# Patient Record
Sex: FEMALE | Race: WHITE | URBAN - METROPOLITAN AREA
[De-identification: names, ages, dates, MRNs, and addresses within clinical notes are randomized per-mention and may not be internally consistent; named-entity substitution may affect disease eponyms.]

---

## 2017-03-27 ENCOUNTER — TRANSFERRED RECORDS (OUTPATIENT)
Dept: HEALTH INFORMATION MANAGEMENT | Facility: CLINIC | Age: 37
End: 2017-03-27

## 2020-02-06 ENCOUNTER — TRANSFERRED RECORDS (OUTPATIENT)
Dept: HEALTH INFORMATION MANAGEMENT | Facility: CLINIC | Age: 40
End: 2020-02-06

## 2020-06-11 ENCOUNTER — TRANSFERRED RECORDS (OUTPATIENT)
Dept: HEALTH INFORMATION MANAGEMENT | Facility: CLINIC | Age: 40
End: 2020-06-11

## 2020-06-16 ENCOUNTER — TRANSFERRED RECORDS (OUTPATIENT)
Dept: HEALTH INFORMATION MANAGEMENT | Facility: CLINIC | Age: 40
End: 2020-06-16

## 2020-06-19 ENCOUNTER — TRANSFERRED RECORDS (OUTPATIENT)
Dept: HEALTH INFORMATION MANAGEMENT | Facility: CLINIC | Age: 40
End: 2020-06-19

## 2020-06-26 ENCOUNTER — TRANSFERRED RECORDS (OUTPATIENT)
Dept: HEALTH INFORMATION MANAGEMENT | Facility: CLINIC | Age: 40
End: 2020-06-26

## 2020-07-07 ENCOUNTER — PATIENT OUTREACH (OUTPATIENT)
Dept: ONCOLOGY | Facility: CLINIC | Age: 40
End: 2020-07-07

## 2020-07-07 DIAGNOSIS — C50.919 BREAST CANCER (H): Primary | ICD-10-CM

## 2020-07-07 NOTE — TELEPHONE ENCOUNTER
Action    Action Taken 7/7/20: Images from [facility], and now viewable to PACS    -[facility] Tracking [place] [place] [id number]  2:59 PM     RECORDS STATUS - BREAST    RECORDS REQUESTED FROM: [facility]   DATE REQUESTED:    NOTES DETAILS STATUS   OFFICE NOTE from referring provider Requested 7/7 [facility]   OFFICE NOTE from medical oncologist     OFFICE NOTE from surgeon     OFFICE NOTE from radiation oncologist     DISCHARGE SUMMARY from hospital     DISCHARGE REPORT from the ER     OPERATIVE REPORT     MEDICATION LIST     CLINICAL TRIAL TREATMENTS TO DATE     LABS     PATHOLOGY REPORTS  (Tissue diagnosis, Stage, ER/OH percentage positive and intensity of staining, HER2 IHC, FISH, and all biopsies from breast and any distant metastasis)                 Slides & Report [place] [facility] [place]    GENONOMIC TESTING     TYPE:   (Next Generation Sequencing, including Foundation One testing, and Oncotype score)     IMAGING (NEED IMAGES & REPORT)     CT SCANS     MRI     MAMMO PACS 6/26/20, 6/11/20   ULTRASOUND PACS 6/11/20   PET     BONE SCAN     BRAIN MRI

## 2020-07-07 NOTE — PROGRESS NOTES
New Patient Oncology Nurse Navigator Note     Referring provider: Dr Gaxiola, [facility]    Referring Clinic/Organization: Other - [facility]     Referred to: Medical Oncology - Solid Tumor     Requested provider (if applicable): Dr. Gerardo    Referral Received: 07/07/20       Evaluation for : breast cancer, right     Clinical History (per Nurse review of records provided):      No records received from [facility] yet, but pt's verbal report states:    * 6/4 pt notices lump, right breast   * 6/11 mammogram & US with biopsy - confirms DCIS, Right Breast (ER + AR + HER2 +)   * 6/15 & 6/16 met w/ Surg Onc and plastics   * 6/19 med onc     * Pt states she will get MRI on 7/9 at 1:30pm at [facility] as well.    Clinical Assessment / Barriers to Care (Per Nurse):    Pt requests 2nd opinion with [facility], Dr Gerardo can do video visit with pt on Thurs 7/9 at 7:30am. Pt agreeable to this plan. Records will start process of record collection today.       Records Location: No records received     Records Needed:     [facility], Juvencio MONTALVO    Additional testing needed prior to consult:     None    Kev Snyder, RN, BSN, OCN

## 2020-07-08 ENCOUNTER — PRE VISIT (OUTPATIENT)
Dept: ONCOLOGY | Facility: CLINIC | Age: 40
End: 2020-07-08

## 2020-07-09 ENCOUNTER — TRANSFERRED RECORDS (OUTPATIENT)
Dept: HEALTH INFORMATION MANAGEMENT | Facility: CLINIC | Age: 40
End: 2020-07-09

## 2020-07-09 ENCOUNTER — VIRTUAL VISIT (OUTPATIENT)
Dept: ONCOLOGY | Facility: CLINIC | Age: 40
End: 2020-07-09
Attending: INTERNAL MEDICINE
Payer: COMMERCIAL

## 2020-07-09 DIAGNOSIS — C50.911 MALIGNANT NEOPLASM OF RIGHT FEMALE BREAST, UNSPECIFIED ESTROGEN RECEPTOR STATUS, UNSPECIFIED SITE OF BREAST (H): Primary | ICD-10-CM

## 2020-07-09 DIAGNOSIS — C50.919 BREAST CANCER (H): ICD-10-CM

## 2020-07-09 PROCEDURE — 99204 OFFICE O/P NEW MOD 45 MIN: CPT | Mod: 95 | Performed by: INTERNAL MEDICINE

## 2020-07-09 PROCEDURE — 40001009 ZZH VIDEO/TELEPHONE VISIT; NO CHARGE

## 2020-07-09 SDOH — HEALTH STABILITY: MENTAL HEALTH: HOW MANY STANDARD DRINKS CONTAINING ALCOHOL DO YOU HAVE ON A TYPICAL DAY?: 1 OR 2

## 2020-07-09 ASSESSMENT — MIFFLIN-ST. JEOR: SCORE: 1175.04

## 2020-07-09 ASSESSMENT — PAIN SCALES - GENERAL: PAINLEVEL: NO PAIN (0)

## 2020-07-09 NOTE — LETTER
"    7/9/2020         RE: Leah Rangel  [address] [address]        Dear Colleague,    Thank you for referring your patient, Leah Rangel, to the [facility]. Please see a copy of my visit note below.    Leah Rangel is a 40 year old female who is being evaluated via a billable video visit.      The patient has been notified of following:     \"This video visit will be conducted via a call between you and your physician/provider. We have found that certain health care needs can be provided without the need for an in-person physical exam.  This service lets us provide the care you need with a video conversation.  If a prescription is necessary we can send it directly to your pharmacy.  If lab work is needed we can place an order for that and you can then stop by our lab to have the test done at a later time.    Video visits are billed at different rates depending on your insurance coverage.  Please reach out to your insurance provider with any questions.    If during the course of the call the physician/provider feels a video visit is not appropriate, you will not be charged for this service.\"    Patient has given verbal consent for Video visit? Yes    How would you like to obtain your AVS? MyChart     Vitals - Patient Reported  Weight (Patient Reported): 47.6 kg (105 lb)  Height (Patient Reported): 162.6 cm (5' 4\")  BMI (Based on Pt Reported Ht/Wt): 18.02    Aron Ding LPN    Video-Visit Details    Type of service:  Video Visit    Video Time:  50 min    Originating Location (pt. Location): Home    Distant Location (provider location):  [facility]     Platform used for Video Visit: Lucio Gerardo MD        Service Date: 07/09/2020      VIDEO VISIT       ONCOLOGY CONSULT NOTE      I was asked to see Ms. Leah Rangel at the request of Dr. Minor Erickson for newly diagnosed right breast cancer.      HISTORY OF PRESENT ILLNESS:  Leah" is a 40-year-old, premenopausal female with no significant past medical history who comes in today for consultation regarding a new right breast cancer.  I am seeing her over a video visit due to the COVID pandemic.      Leah reports that she has a history of bilateral implants.  She noticed a palpable mass involving her right breast in the upper inner quadrant in early 06/2020.  At that time, she went in for an evaluation with a mammogram and ultrasound.  Mammogram on 06/11/2020 revealed a 7 mm mass at the 1 o'clock position approximately 5 cm from the nipple in the upper inner quadrant of the right breast.  There was a second area measuring 6 x 5 x 4 mm at the 1 o'clock position 6 cm from the nipple.  In the left breast, there was an area of microcalcifications.  She was referred for a right breast biopsy.  Both areas at the 1 o'clock position were biopsied.  No lymph nodes were appreciated.  Both of these biopsies revealed an invasive ductal carcinoma, grade 3, ER positive, AR positive, HER-2 positive, 3+ by IHC.  She met with a surgeon, a plastic surgeon as well as a medical oncologist, Dr. Adelso Balderas.  She was discussed having neoadjuvant chemotherapy followed by bilateral mastectomy and reconstruction.  She is anxious to get started with therapy.  She has been discussing getting a second opinion and as a result comes to see me.  At the time of evaluation by Dr. Adelso Balderas, she was felt to have a palpable right axillary lymph node measuring 1.5 cm and was considered to have a T1b multifocal N1 breast cancer.      On evaluation, her imaging is as described above.  She was subsequently referred for a breast MRI.  The MRI was of poor quality and is rescheduled for today.  She has had no systemic imaging.      Leah reports she is feeling somewhat anxious about her new diagnosis.  She has a history of tobacco use; she quit 8 days ago.  She drinks approximately 3-4 alcoholic beverages daily and has done this  for many years.      She is premenopausal.  She has a Mirena IUD in place that is scheduled to be removed tomorrow.  She has 2 daughters that are 16 and age 8.  She has had no prior breast biopsies.  P4.      REVIEW OF SYSTEMS:  Her 10 point review of systems is otherwise negative.  She is on the video visit with her  and her sister.      PAST MEDICAL HISTORY:  Significant for:   1.  Premenopausal with a Mirena IUD in place.   2.  Bilateral implants.   3.  Tobacco use.   4.  Macrocytosis.   5.  History of alcohol abuse.   6.  Newly diagnosed right breast cancer.      MEDICATIONS:  None.      ALLERGIES:  No known drug allergies.      SOCIAL HISTORY:  She works as an  for [employer].  She is currently on [employer].  She has 2 daughters, ages 16 and 8.  She is .  She is here on the video with her  and her sister.  She has a supportive family.  She reports regular tobacco use.  She quit 8 days ago as well as alcoholic beverages 3-4 drinks per day.      FAMILY HISTORY:  She had a father who passed away at age 51 of a carcinoma of unknown primary.  Paternal grandmother had breast cancer at age 55.  Paternal aunt had breast cancer at age 53.  Her mother is alive and healthy.  Mother has diabetes.  She has 1 brother and 1 sister.  Her sister has Graves disease and dyslipidemia.      PHYSICAL EXAMINATION:  She is well appearing.  She appears in no apparent distress.  Her HEENT exam reveals no icterus.  She has a full affect.  She is breathing at a comfortable rate.  Her skin has no rashes.  She is able to show me on the imaging this palpable area involving the right upper inner quadrant of her breast.      LABORATORIES AND IMAGING:  Her outside imaging was reviewed revealing 2 focal areas in the right breast.  There is a question on the mammogram report about calcifications involving the left breast.  She has no breast MRI, as the images were of poor quality.      White blood cell count  7.0, hemoglobin 14.6 with macrocytosis and MCV of 105, platelets 231.      ASSESSMENT AND PLAN:  This is a 40-year-old, premenopausal female with a history of tobacco and alcohol use and bilateral breast implants who comes in with a T1b multifocal estrogen-receptor positive, progesterone-receptor positive, HER-2 positive breast cancer.  It is unclear her sylvie status as I met her over a video visit.  Her imaging thus far has not demonstrated any palpable nodes.      I reviewed with the patient today that she appears to have a curable cancer.  We discussed the next steps in terms of her ongoing treatment.      I discussed with Leah that she has HER-2 positive breast cancer.  As a result, I would recommend consideration for neoadjuvant chemotherapy.  We briefly discussed that going forward with surgical resection first could be a reasonable next step.  However, knowing the response to treatment in the neoadjuvant setting can allow us to tailor adjuvant therapies depending on her response.      I would like her to get a breast MRI performed, and this is scheduled for later today.  I would like to review her imaging with Radiology.  The outside report suggested there may also be calcifications involving the left breast.      I reviewed with her moving forward with chemotherapy using Taxol weekly for 12 weeks in conjunction with Herceptin and Perjeta.  Herceptin and Perjeta are 2 monoclonal antibodies against HER-2 and HER-3.  These are given as IVs every 3 weeks for 1 whole year.  Initially, they are given with Taxol.  We discussed if she does appear to have lymph node involvement or more extensive disease by imaging, I would recommend a different chemotherapy regimen using docetaxel and carboplatin in conjunction with Herceptin and Perjeta IV every 3 weeks for 6 cycles followed by Herceptin and Perjeta to complete 1 year of therapy.  We discussed that in the adjuvant setting after surgery, sometimes adjuvant  Herceptin is switched to T-DM1 based on the response to treatment and the results of the TOMAS trial.  She will need a port placed as well as an echocardiogram.  If she has sylvie disease, I would recommend systemic imaging at baseline.      She has met with a surgeon and a plastic surgeon.  She feels very comfortable moving forward with bilateral mastectomy and reconstruction.  I did review with her that there is no survival advantage to doing a mastectomy.  We discussed the pros and cons.  I reviewed that there is an increased risk of complications in a patient who is a tobacco user.  She just recently quit.  I would like to see her genetic testing.  We did discuss that if there is an abnormality, it would be very reasonable to move forward with bilateral mastectomy.      Given her tumor is estrogen positive, she will need antiestrogen therapy using tamoxifen or an aromatase inhibitor.  We very briefly discussed this and reviewed that we will discuss this again in the future as we get closer to that time.      It was a pleasure to meet Cornelio today.        NEXT STEPS:     1.  Echocardiogram.   2.  Blood work, including genetic testing.  I reviewed and consented the patient over the telephone today.  Based on her personal and family history, she meets criteria.  This would impact her treatment as well as her surgical planning.   3.  Breast MRI that will need to be reviewed here.   4.  Port.   5.  Schedule chemotherapy to begin next week.   6.  She is having her Mirena IUD removed.   7.  I encouraged her to maintain abstinence from alcohol and tobacco use.   8.  Provided additional supportive care services for the patient.      We will plan to schedule her next week.  I will call her when I get her MRI reports.         TOI TAFOYA MD             D: 2020   T: 2020   MT: myah      Name:     CORNELIO ARCOS   MRN:      [id number]        Account:      [id number]   :      1980            Service Date: 07/09/2020      Document: [id number]        Addendum:    I reviewed the patient's MRI and other imaging at our breast conference this morning.  On MRI, she has an abnormal area of enhancement measuring 4.5 cm in diameter.  This is a combination of mass and nonmasslike enhancement.  She likely has T2 or T3 N0 disease.  Based on her stage 2 disease, I advised chemotherapy with TCHP using taxotere, carboplatin, herceptin and perjeta IV every three weeks for six cycles followed by adjuvant herceptin based therapy (exact combination based on pathologic response).      She will need mastectomy.      The lymph nodes appeared normal on imaging (in the axilla).  There was a question regarding the internal mammary lymph node.  As a result, I will arrange for a PET scan.    I put a call to discuss with the patient.    Lucita Gerardo MD       Again, thank you for allowing me to participate in the care of your patient.        Sincerely,        Lucita Gerardo MD

## 2020-07-09 NOTE — TELEPHONE ENCOUNTER
Oncology/Surgical Oncology Referral Request:     Specialty Requested: Medical Oncology     Referring Provider: Dr Lucita Gerardo MD    Referring Clinic/Organization: [facility]    Records location: [facility]     Requested Provider (if specified): Not Specified

## 2020-07-09 NOTE — LETTER
Date:July 13, 2020      Patient was self referred, no letter generated. Do not send.        [facility] Health Information

## 2020-07-09 NOTE — PROGRESS NOTES
Service Date: 07/09/2020      VIDEO VISIT       ONCOLOGY CONSULT NOTE      I was asked to see Ms. Leah Rangel at the request of Dr. Minor Erickson for newly diagnosed right breast cancer.      HISTORY OF PRESENT ILLNESS:  Leah is a 40-year-old, premenopausal female with no significant past medical history who comes in today for consultation regarding a new right breast cancer.  I am seeing her over a video visit due to the COVID pandemic.      Leah reports that she has a history of bilateral implants.  She noticed a palpable mass involving her right breast in the upper inner quadrant in early 06/2020.  At that time, she went in for an evaluation with a mammogram and ultrasound.  Mammogram on 06/11/2020 revealed a 7 mm mass at the 1 o'clock position approximately 5 cm from the nipple in the upper inner quadrant of the right breast.  There was a second area measuring 6 x 5 x 4 mm at the 1 o'clock position 6 cm from the nipple.  In the left breast, there was an area of microcalcifications.  She was referred for a right breast biopsy.  Both areas at the 1 o'clock position were biopsied.  No lymph nodes were appreciated.  Both of these biopsies revealed an invasive ductal carcinoma, grade 3, ER positive, NH positive, HER-2 positive, 3+ by IHC.  She met with a surgeon, a plastic surgeon as well as a medical oncologist, Dr. Adelso Balderas.  She was discussed having neoadjuvant chemotherapy followed by bilateral mastectomy and reconstruction.  She is anxious to get started with therapy.  She has been discussing getting a second opinion and as a result comes to see me.  At the time of evaluation by Dr. Adelso Balderas, she was felt to have a palpable right axillary lymph node measuring 1.5 cm and was considered to have a T1b multifocal N1 breast cancer.      On evaluation, her imaging is as described above.  She was subsequently referred for a breast MRI.  The MRI was of poor quality and is rescheduled for today.  She has  had no systemic imaging.      Leah reports she is feeling somewhat anxious about her new diagnosis.  She has a history of tobacco use; she quit 8 days ago.  She drinks approximately 3-4 alcoholic beverages daily and has done this for many years.      She is premenopausal.  She has a Mirena IUD in place that is scheduled to be removed tomorrow.  She has 2 daughters that are 16 and age 8.  She has had no prior breast biopsies.  P4.      REVIEW OF SYSTEMS:  Her 10 point review of systems is otherwise negative.  She is on the video visit with her  and her sister.      PAST MEDICAL HISTORY:  Significant for:   1.  Premenopausal with a Mirena IUD in place.   2.  Bilateral implants.   3.  Tobacco use.   4.  Macrocytosis.   5.  History of alcohol abuse.   6.  Newly diagnosed right breast cancer.      MEDICATIONS:  None.      ALLERGIES:  No known drug allergies.      SOCIAL HISTORY:  She works as an  for [employer].  She is currently on [employer].  She has 2 daughters, ages 16 and 8.  She is .  She is here on the video with her  and her sister.  She has a supportive family.  She reports regular tobacco use.  She quit 8 days ago as well as alcoholic beverages 3-4 drinks per day.      FAMILY HISTORY:  She had a father who passed away at age 51 of a carcinoma of unknown primary.  Paternal grandmother had breast cancer at age 55.  Paternal aunt had breast cancer at age 53.  Her mother is alive and healthy.  Mother has diabetes.  She has 1 brother and 1 sister.  Her sister has Graves disease and dyslipidemia.      PHYSICAL EXAMINATION:  She is well appearing.  She appears in no apparent distress.  Her HEENT exam reveals no icterus.  She has a full affect.  She is breathing at a comfortable rate.  Her skin has no rashes.  She is able to show me on the imaging this palpable area involving the right upper inner quadrant of her breast.      LABORATORIES AND IMAGING:  Her outside imaging was  reviewed revealing 2 focal areas in the right breast.  There is a question on the mammogram report about calcifications involving the left breast.  She has no breast MRI, as the images were of poor quality.      White blood cell count 7.0, hemoglobin 14.6 with macrocytosis and MCV of 105, platelets 231.      ASSESSMENT AND PLAN:  This is a 40-year-old, premenopausal female with a history of tobacco and alcohol use and bilateral breast implants who comes in with a T1b multifocal estrogen-receptor positive, progesterone-receptor positive, HER-2 positive breast cancer.  It is unclear her sylvie status as I met her over a video visit.  Her imaging thus far has not demonstrated any palpable nodes.      I reviewed with the patient today that she appears to have a curable cancer.  We discussed the next steps in terms of her ongoing treatment.      I discussed with Leah that she has HER-2 positive breast cancer.  As a result, I would recommend consideration for neoadjuvant chemotherapy.  We briefly discussed that going forward with surgical resection first could be a reasonable next step.  However, knowing the response to treatment in the neoadjuvant setting can allow us to tailor adjuvant therapies depending on her response.      I would like her to get a breast MRI performed, and this is scheduled for later today.  I would like to review her imaging with Radiology.  The outside report suggested there may also be calcifications involving the left breast.      I reviewed with her moving forward with chemotherapy using Taxol weekly for 12 weeks in conjunction with Herceptin and Perjeta.  Herceptin and Perjeta are 2 monoclonal antibodies against HER-2 and HER-3.  These are given as IVs every 3 weeks for 1 whole year.  Initially, they are given with Taxol.  We discussed if she does appear to have lymph node involvement or more extensive disease by imaging, I would recommend a different chemotherapy regimen using docetaxel  and carboplatin in conjunction with Herceptin and Perjeta IV every 3 weeks for 6 cycles followed by Herceptin and Perjeta to complete 1 year of therapy.  We discussed that in the adjuvant setting after surgery, sometimes adjuvant Herceptin is switched to T-DM1 based on the response to treatment and the results of the TOMAS trial.  She will need a port placed as well as an echocardiogram.  If she has sylvie disease, I would recommend systemic imaging at baseline.      She has met with a surgeon and a plastic surgeon.  She feels very comfortable moving forward with bilateral mastectomy and reconstruction.  I did review with her that there is no survival advantage to doing a mastectomy.  We discussed the pros and cons.  I reviewed that there is an increased risk of complications in a patient who is a tobacco user.  She just recently quit.  I would like to see her genetic testing.  We did discuss that if there is an abnormality, it would be very reasonable to move forward with bilateral mastectomy.      Given her tumor is estrogen positive, she will need antiestrogen therapy using tamoxifen or an aromatase inhibitor.  We very briefly discussed this and reviewed that we will discuss this again in the future as we get closer to that time.      It was a pleasure to meet Leah today.        NEXT STEPS:     1.  Echocardiogram.   2.  Blood work, including genetic testing.  I reviewed and consented the patient over the telephone today.  Based on her personal and family history, she meets criteria.  This would impact her treatment as well as her surgical planning.   3.  Breast MRI that will need to be reviewed here.   4.  Port.   5.  Schedule chemotherapy to begin next week.   6.  She is having her Mirena IUD removed.   7.  I encouraged her to maintain abstinence from alcohol and tobacco use.   8.  Provided additional supportive care services for the patient.      We will plan to schedule her next week.  I will call her  when I get her MRI reports.         LUCITA TAFOYA MD             D: 2020   T: 2020   MT: myah      Name:     CORNELIO ARCOS   MRN:      [id number]        Account:      [id number]   :      1980           Service Date: 2020      Document: [id number]        Addendum:    I reviewed the patient's MRI and other imaging at our breast conference this morning.  On MRI, she has an abnormal area of enhancement measuring 4.5 cm in diameter.  This is a combination of mass and nonmasslike enhancement.  She likely has T2 or T3 N0 disease.  Based on her stage 2 disease, I advised chemotherapy with TCHP using taxotere, carboplatin, herceptin and perjeta IV every three weeks for six cycles followed by adjuvant herceptin based therapy (exact combination based on pathologic response).      She will need mastectomy.      The lymph nodes appeared normal on imaging (in the axilla).  There was a question regarding the internal mammary lymph node.  As a result, I will arrange for a PET scan.    I put a call to discuss with the patient.    Lucita Tafoya MD

## 2020-07-09 NOTE — PROGRESS NOTES
"Leah Rangel is a 40 year old female who is being evaluated via a billable video visit.      The patient has been notified of following:     \"This video visit will be conducted via a call between you and your physician/provider. We have found that certain health care needs can be provided without the need for an in-person physical exam.  This service lets us provide the care you need with a video conversation.  If a prescription is necessary we can send it directly to your pharmacy.  If lab work is needed we can place an order for that and you can then stop by our lab to have the test done at a later time.    Video visits are billed at different rates depending on your insurance coverage.  Please reach out to your insurance provider with any questions.    If during the course of the call the physician/provider feels a video visit is not appropriate, you will not be charged for this service.\"    Patient has given verbal consent for Video visit? Yes    How would you like to obtain your AVS? MyChart     Vitals - Patient Reported  Weight (Patient Reported): 47.6 kg (105 lb)  Height (Patient Reported): 162.6 cm (5' 4\")  BMI (Based on Pt Reported Ht/Wt): 18.02    Aron Ding LPN    Video-Visit Details    Type of service:  Video Visit    Video Time:  50 min    Originating Location (pt. Location): Home    Distant Location (provider location):  [facility]     Platform used for Video Visit: Lucio Gerardo MD      "

## 2020-07-10 ENCOUNTER — TELEPHONE (OUTPATIENT)
Dept: ONCOLOGY | Facility: CLINIC | Age: 40
End: 2020-07-10

## 2020-07-10 ENCOUNTER — PATIENT OUTREACH (OUTPATIENT)
Dept: ONCOLOGY | Facility: CLINIC | Age: 40
End: 2020-07-10

## 2020-07-10 VITALS — BODY MASS INDEX: 18.99 KG/M2 | WEIGHT: 114 LBS | HEIGHT: 65 IN

## 2020-07-10 DIAGNOSIS — C50.911 MALIGNANT NEOPLASM OF RIGHT FEMALE BREAST, UNSPECIFIED ESTROGEN RECEPTOR STATUS, UNSPECIFIED SITE OF BREAST (H): Primary | ICD-10-CM

## 2020-07-10 DIAGNOSIS — Z11.59 SCREENING FOR VIRAL DISEASE: Primary | ICD-10-CM

## 2020-07-13 ENCOUNTER — PATIENT OUTREACH (OUTPATIENT)
Dept: ONCOLOGY | Facility: CLINIC | Age: 40
End: 2020-07-13

## 2020-07-13 ENCOUNTER — ANCILLARY PROCEDURE (OUTPATIENT)
Dept: CARDIOLOGY | Facility: CLINIC | Age: 40
End: 2020-07-13
Attending: INTERNAL MEDICINE
Payer: COMMERCIAL

## 2020-07-13 DIAGNOSIS — C50.919 BREAST CANCER (H): Primary | ICD-10-CM

## 2020-07-13 DIAGNOSIS — C50.911 MALIGNANT NEOPLASM OF RIGHT FEMALE BREAST, UNSPECIFIED ESTROGEN RECEPTOR STATUS, UNSPECIFIED SITE OF BREAST (H): ICD-10-CM

## 2020-07-13 DIAGNOSIS — C50.919 BREAST CANCER (H): ICD-10-CM

## 2020-07-13 DIAGNOSIS — C50.211 MALIGNANT NEOPLASM OF UPPER-INNER QUADRANT OF RIGHT BREAST IN FEMALE, ESTROGEN RECEPTOR POSITIVE (H): ICD-10-CM

## 2020-07-13 DIAGNOSIS — Z17.0 MALIGNANT NEOPLASM OF UPPER-INNER QUADRANT OF RIGHT BREAST IN FEMALE, ESTROGEN RECEPTOR POSITIVE (H): ICD-10-CM

## 2020-07-13 DIAGNOSIS — Z11.59 SCREENING FOR VIRAL DISEASE: Primary | ICD-10-CM

## 2020-07-13 LAB
ALBUMIN SERPL-MCNC: 3.9 G/DL (ref 3.4–5)
ALP SERPL-CCNC: 60 U/L (ref 40–150)
ALT SERPL W P-5'-P-CCNC: 35 U/L (ref 0–50)
ANION GAP SERPL CALCULATED.3IONS-SCNC: 6 MMOL/L (ref 3–14)
AST SERPL W P-5'-P-CCNC: 36 U/L (ref 0–45)
BASOPHILS # BLD AUTO: 0.1 10E9/L (ref 0–0.2)
BASOPHILS NFR BLD AUTO: 0.6 %
BILIRUB SERPL-MCNC: 0.4 MG/DL (ref 0.2–1.3)
BUN SERPL-MCNC: 6 MG/DL (ref 7–30)
CALCIUM SERPL-MCNC: 8.8 MG/DL (ref 8.5–10.1)
CHLORIDE SERPL-SCNC: 103 MMOL/L (ref 94–109)
CO2 SERPL-SCNC: 27 MMOL/L (ref 20–32)
CREAT SERPL-MCNC: 0.56 MG/DL (ref 0.52–1.04)
DIFFERENTIAL METHOD BLD: ABNORMAL
EOSINOPHIL # BLD AUTO: 0.1 10E9/L (ref 0–0.7)
EOSINOPHIL NFR BLD AUTO: 1.2 %
ERYTHROCYTE [DISTWIDTH] IN BLOOD BY AUTOMATED COUNT: 13.1 % (ref 10–15)
GFR SERPL CREATININE-BSD FRML MDRD: >90 ML/MIN/{1.73_M2}
GLUCOSE SERPL-MCNC: 83 MG/DL (ref 70–99)
HCT VFR BLD AUTO: 41.7 % (ref 35–47)
HGB BLD-MCNC: 14.4 G/DL (ref 11.7–15.7)
IMM GRANULOCYTES # BLD: 0 10E9/L (ref 0–0.4)
IMM GRANULOCYTES NFR BLD: 0.5 %
LYMPHOCYTES # BLD AUTO: 1.8 10E9/L (ref 0.8–5.3)
LYMPHOCYTES NFR BLD AUTO: 22.9 %
MCH RBC QN AUTO: 37 PG (ref 26.5–33)
MCHC RBC AUTO-ENTMCNC: 34.5 G/DL (ref 31.5–36.5)
MCV RBC AUTO: 107 FL (ref 78–100)
MONOCYTES # BLD AUTO: 0.6 10E9/L (ref 0–1.3)
MONOCYTES NFR BLD AUTO: 8.2 %
NEUTROPHILS # BLD AUTO: 5.1 10E9/L (ref 1.6–8.3)
NEUTROPHILS NFR BLD AUTO: 66.6 %
NRBC # BLD AUTO: 0 10*3/UL
NRBC BLD AUTO-RTO: 0 /100
PLATELET # BLD AUTO: 185 10E9/L (ref 150–450)
POTASSIUM SERPL-SCNC: 4.1 MMOL/L (ref 3.4–5.3)
PROT SERPL-MCNC: 7.5 G/DL (ref 6.8–8.8)
RBC # BLD AUTO: 3.89 10E12/L (ref 3.8–5.2)
SARS-COV-2 RNA SPEC QL NAA+PROBE: NOT DETECTED
SODIUM SERPL-SCNC: 136 MMOL/L (ref 133–144)
SPECIMEN SOURCE: NORMAL
WBC # BLD AUTO: 7.7 10E9/L (ref 4–11)

## 2020-07-13 PROCEDURE — 40000803 ZZHCL STATISTIC DNA ISOL HIGH PURITY: Performed by: INTERNAL MEDICINE

## 2020-07-13 PROCEDURE — 80053 COMPREHEN METABOLIC PANEL: CPT | Performed by: INTERNAL MEDICINE

## 2020-07-13 PROCEDURE — 82306 VITAMIN D 25 HYDROXY: CPT | Performed by: INTERNAL MEDICINE

## 2020-07-13 PROCEDURE — 85025 COMPLETE CBC W/AUTO DIFF WBC: CPT | Performed by: INTERNAL MEDICINE

## 2020-07-13 NOTE — PROGRESS NOTES
Oncology RN Care Coordination Note:     This writer called Leah twice this morning to let her know that Rosita was unexpectedly out today, the first attempted, Leah was at her covid test appointment.  The second attempt was at the time Rosita was supposed to reach out to her to go over her chemo teaching.  I discussed with her a time tomorrow that would work best for chemo teaching, she said 12p again.      Leah stated she was concerned about her PET scan that Dr. Gerardo ordered, she said she is claustrophobic and if the machine is a closed type machine, she would need some Lorazepam to help sedate her; if the machine is more open and she can see out both ends, she wouldn't need the medication.  I told her I'd update Dr. Gerardo and Rosita with this information.    We then further discussed how she is not sleeping, she states she thinks it's a combination of tumor pain and anxiety.  I asked if she had been introduced to our palliative care team yet?  She said she had not, I gave her an introduction, discussing how they help with managing pain, anxiety, coping, etc., related to her cancer diagnosis.  I offered to place a referral for her and to have someone reach out to get her scheduled, she said she would appreciate that.      I have sent a message to Bobbi Gonzalez, ROSY and Aram Arora, Clinic Coordinator both with the palliative care team updating them about Leah and requesting an appointment.     Diana Arroyo RN BSN   [facility]  Nurse Coordinator

## 2020-07-14 ENCOUNTER — ANESTHESIA EVENT (OUTPATIENT)
Dept: SURGERY | Facility: AMBULATORY SURGERY CENTER | Age: 40
End: 2020-07-14

## 2020-07-14 ENCOUNTER — PATIENT OUTREACH (OUTPATIENT)
Dept: ONCOLOGY | Facility: CLINIC | Age: 40
End: 2020-07-14

## 2020-07-14 DIAGNOSIS — C50.919 BREAST CANCER (H): Primary | ICD-10-CM

## 2020-07-14 LAB — DEPRECATED CALCIDIOL+CALCIFEROL SERPL-MC: 61 UG/L (ref 20–75)

## 2020-07-15 ENCOUNTER — PATIENT OUTREACH (OUTPATIENT)
Dept: ONCOLOGY | Facility: CLINIC | Age: 40
End: 2020-07-15

## 2020-07-15 ENCOUNTER — ANCILLARY PROCEDURE (OUTPATIENT)
Dept: RADIOLOGY | Facility: AMBULATORY SURGERY CENTER | Age: 40
End: 2020-07-15
Attending: INTERNAL MEDICINE
Payer: COMMERCIAL

## 2020-07-15 ENCOUNTER — HOSPITAL ENCOUNTER (OUTPATIENT)
Facility: AMBULATORY SURGERY CENTER | Age: 40
End: 2020-07-15
Attending: RADIOLOGY
Payer: COMMERCIAL

## 2020-07-15 ENCOUNTER — ANESTHESIA (OUTPATIENT)
Dept: SURGERY | Facility: AMBULATORY SURGERY CENTER | Age: 40
End: 2020-07-15

## 2020-07-15 VITALS
SYSTOLIC BLOOD PRESSURE: 108 MMHG | WEIGHT: 101 LBS | RESPIRATION RATE: 16 BRPM | TEMPERATURE: 98 F | OXYGEN SATURATION: 95 % | BODY MASS INDEX: 17.24 KG/M2 | DIASTOLIC BLOOD PRESSURE: 73 MMHG | HEIGHT: 64 IN

## 2020-07-15 VITALS — BODY MASS INDEX: 17.16 KG/M2 | WEIGHT: 100 LBS

## 2020-07-15 DIAGNOSIS — F41.0 PANIC DISORDER WITHOUT AGORAPHOBIA: Primary | ICD-10-CM

## 2020-07-15 DIAGNOSIS — C50.919 BREAST CANCER (H): ICD-10-CM

## 2020-07-15 DIAGNOSIS — C50.911 MALIGNANT NEOPLASM OF RIGHT FEMALE BREAST, UNSPECIFIED ESTROGEN RECEPTOR STATUS, UNSPECIFIED SITE OF BREAST (H): ICD-10-CM

## 2020-07-15 LAB
B-HCG SERPL-ACNC: <1 IU/L (ref 0–5)
INR PPP: 0.96 (ref 0.86–1.14)

## 2020-07-15 DEVICE — CATH PORT POWERPORT CLEARVUE SLIM 6FR 5616000
Type: IMPLANTABLE DEVICE | Site: CHEST | Status: NON-FUNCTIONAL
Removed: 2021-09-02

## 2020-07-15 RX ORDER — HEPARIN SODIUM,PORCINE 10 UNIT/ML
5 VIAL (ML) INTRAVENOUS EVERY 24 HOURS
Status: DISCONTINUED | OUTPATIENT
Start: 2020-07-15 | End: 2020-07-16 | Stop reason: HOSPADM

## 2020-07-15 RX ORDER — NALOXONE HYDROCHLORIDE 0.4 MG/ML
.1-.4 INJECTION, SOLUTION INTRAMUSCULAR; INTRAVENOUS; SUBCUTANEOUS
Status: DISCONTINUED | OUTPATIENT
Start: 2020-07-15 | End: 2020-07-16 | Stop reason: HOSPADM

## 2020-07-15 RX ORDER — MEPERIDINE HYDROCHLORIDE 25 MG/ML
12.5 INJECTION INTRAMUSCULAR; INTRAVENOUS; SUBCUTANEOUS
Status: DISCONTINUED | OUTPATIENT
Start: 2020-07-15 | End: 2020-07-16 | Stop reason: HOSPADM

## 2020-07-15 RX ORDER — GABAPENTIN 300 MG/1
300 CAPSULE ORAL ONCE
Status: COMPLETED | OUTPATIENT
Start: 2020-07-15 | End: 2020-07-15

## 2020-07-15 RX ORDER — ACETAMINOPHEN 325 MG/1
975 TABLET ORAL ONCE
Status: COMPLETED | OUTPATIENT
Start: 2020-07-15 | End: 2020-07-15

## 2020-07-15 RX ORDER — SODIUM CHLORIDE, SODIUM LACTATE, POTASSIUM CHLORIDE, CALCIUM CHLORIDE 600; 310; 30; 20 MG/100ML; MG/100ML; MG/100ML; MG/100ML
INJECTION, SOLUTION INTRAVENOUS CONTINUOUS
Status: DISCONTINUED | OUTPATIENT
Start: 2020-07-15 | End: 2020-07-16 | Stop reason: HOSPADM

## 2020-07-15 RX ORDER — LIDOCAINE 40 MG/G
CREAM TOPICAL
Status: DISCONTINUED | OUTPATIENT
Start: 2020-07-15 | End: 2020-07-16 | Stop reason: HOSPADM

## 2020-07-15 RX ORDER — HYDROMORPHONE HYDROCHLORIDE 1 MG/ML
.3-.5 INJECTION, SOLUTION INTRAMUSCULAR; INTRAVENOUS; SUBCUTANEOUS EVERY 10 MIN PRN
Status: DISCONTINUED | OUTPATIENT
Start: 2020-07-15 | End: 2020-07-16 | Stop reason: HOSPADM

## 2020-07-15 RX ORDER — LIDOCAINE HYDROCHLORIDE 20 MG/ML
INJECTION, SOLUTION INFILTRATION; PERINEURAL PRN
Status: DISCONTINUED | OUTPATIENT
Start: 2020-07-15 | End: 2020-07-15

## 2020-07-15 RX ORDER — FENTANYL CITRATE 50 UG/ML
25-50 INJECTION, SOLUTION INTRAMUSCULAR; INTRAVENOUS
Status: DISCONTINUED | OUTPATIENT
Start: 2020-07-15 | End: 2020-07-16 | Stop reason: HOSPADM

## 2020-07-15 RX ORDER — PROPOFOL 10 MG/ML
INJECTION, EMULSION INTRAVENOUS PRN
Status: DISCONTINUED | OUTPATIENT
Start: 2020-07-15 | End: 2020-07-15

## 2020-07-15 RX ORDER — ONDANSETRON 4 MG/1
4 TABLET, ORALLY DISINTEGRATING ORAL EVERY 30 MIN PRN
Status: DISCONTINUED | OUTPATIENT
Start: 2020-07-15 | End: 2020-07-16 | Stop reason: HOSPADM

## 2020-07-15 RX ORDER — KETOROLAC TROMETHAMINE 30 MG/ML
30 INJECTION, SOLUTION INTRAMUSCULAR; INTRAVENOUS EVERY 6 HOURS PRN
Status: DISCONTINUED | OUTPATIENT
Start: 2020-07-15 | End: 2020-07-16 | Stop reason: HOSPADM

## 2020-07-15 RX ORDER — CEFAZOLIN SODIUM 2 G/50ML
2 SOLUTION INTRAVENOUS
Status: COMPLETED | OUTPATIENT
Start: 2020-07-15 | End: 2020-07-15

## 2020-07-15 RX ORDER — PROPOFOL 10 MG/ML
INJECTION, EMULSION INTRAVENOUS CONTINUOUS PRN
Status: DISCONTINUED | OUTPATIENT
Start: 2020-07-15 | End: 2020-07-15

## 2020-07-15 RX ORDER — HEPARIN SODIUM (PORCINE) LOCK FLUSH IV SOLN 100 UNIT/ML 100 UNIT/ML
5 SOLUTION INTRAVENOUS
Status: DISCONTINUED | OUTPATIENT
Start: 2020-07-15 | End: 2020-07-16 | Stop reason: HOSPADM

## 2020-07-15 RX ORDER — OXYCODONE HYDROCHLORIDE 5 MG/1
5-10 TABLET ORAL EVERY 4 HOURS PRN
Status: DISCONTINUED | OUTPATIENT
Start: 2020-07-15 | End: 2020-07-16 | Stop reason: HOSPADM

## 2020-07-15 RX ORDER — DIAZEPAM 5 MG
TABLET ORAL
Qty: 2 TABLET | Refills: 0 | Status: SHIPPED | OUTPATIENT
Start: 2020-07-15 | End: 2021-08-09

## 2020-07-15 RX ORDER — ONDANSETRON 2 MG/ML
4 INJECTION INTRAMUSCULAR; INTRAVENOUS EVERY 30 MIN PRN
Status: DISCONTINUED | OUTPATIENT
Start: 2020-07-15 | End: 2020-07-16 | Stop reason: HOSPADM

## 2020-07-15 RX ORDER — ALBUTEROL SULFATE 0.83 MG/ML
2.5 SOLUTION RESPIRATORY (INHALATION) EVERY 4 HOURS PRN
Status: DISCONTINUED | OUTPATIENT
Start: 2020-07-15 | End: 2020-07-16 | Stop reason: HOSPADM

## 2020-07-15 RX ORDER — DEXAMETHASONE SODIUM PHOSPHATE 4 MG/ML
4 INJECTION, SOLUTION INTRA-ARTICULAR; INTRALESIONAL; INTRAMUSCULAR; INTRAVENOUS; SOFT TISSUE EVERY 10 MIN PRN
Status: DISCONTINUED | OUTPATIENT
Start: 2020-07-15 | End: 2020-07-16 | Stop reason: HOSPADM

## 2020-07-15 RX ADMIN — PROPOFOL 150 MCG/KG/MIN: 10 INJECTION, EMULSION INTRAVENOUS at 10:51

## 2020-07-15 RX ADMIN — GABAPENTIN 300 MG: 300 CAPSULE ORAL at 10:14

## 2020-07-15 RX ADMIN — SODIUM CHLORIDE, SODIUM LACTATE, POTASSIUM CHLORIDE, CALCIUM CHLORIDE: 600; 310; 30; 20 INJECTION, SOLUTION INTRAVENOUS at 10:39

## 2020-07-15 RX ADMIN — PROPOFOL: 10 INJECTION, EMULSION INTRAVENOUS at 11:14

## 2020-07-15 RX ADMIN — PROPOFOL 30 MG: 10 INJECTION, EMULSION INTRAVENOUS at 11:17

## 2020-07-15 RX ADMIN — CEFAZOLIN SODIUM 2 G: 2 SOLUTION INTRAVENOUS at 10:56

## 2020-07-15 RX ADMIN — LIDOCAINE HYDROCHLORIDE 80 MG: 20 INJECTION, SOLUTION INFILTRATION; PERINEURAL at 10:51

## 2020-07-15 RX ADMIN — ACETAMINOPHEN 975 MG: 325 TABLET ORAL at 10:14

## 2020-07-15 RX ADMIN — PROPOFOL 30 MG: 10 INJECTION, EMULSION INTRAVENOUS at 11:12

## 2020-07-15 ASSESSMENT — MIFFLIN-ST. JEOR: SCORE: 1108.13

## 2020-07-15 ASSESSMENT — LIFESTYLE VARIABLES: TOBACCO_USE: 1

## 2020-07-15 NOTE — ANESTHESIA PREPROCEDURE EVALUATION
"Anesthesia Pre-Procedure Evaluation    Patient: Leah Rangel   MRN:     [id number] Gender:   female   Age:    40 year old :      1980        Preoperative Diagnosis: Malignant neoplasm of right breast (H) [C50.911]   Procedure(s):  Port Placement     LABS:  CBC:   Lab Results   Component Value Date    WBC 7.7 2020    HGB 14.4 2020    HCT 41.7 2020     2020     BMP:   Lab Results   Component Value Date     2020    POTASSIUM 4.1 2020    CHLORIDE 103 2020    CO2 27 2020    BUN 6 (L) 2020    CR 0.56 2020    GLC 83 2020     COAGS: No results found for: PTT, INR, FIBR  POC: No results found for: BGM, HCG, HCGS  OTHER:   Lab Results   Component Value Date    URVASHI 8.8 2020    ALBUMIN 3.9 2020    PROTTOTAL 7.5 2020    ALT 35 2020    AST 36 2020    ALKPHOS 60 2020    BILITOTAL 0.4 2020        Preop Vitals    BP Readings from Last 3 Encounters:   07/15/20 106/67   04 124/69    Pulse Readings from Last 3 Encounters:   04 90      Resp Readings from Last 3 Encounters:   07/15/20 16    SpO2 Readings from Last 3 Encounters:   07/15/20 95%      Temp Readings from Last 1 Encounters:   07/15/20 36.8  C (98.3  F)    Ht Readings from Last 1 Encounters:   07/15/20 1.626 m (5' 4\")      Wt Readings from Last 1 Encounters:   07/15/20 45.8 kg (101 lb)    Estimated body mass index is 17.34 kg/m  as calculated from the following:    Height as of this encounter: 1.626 m (5' 4\").    Weight as of this encounter: 45.8 kg (101 lb).     LDA:  Peripheral IV 07/15/20 Left Hand (Active)   Site Assessment WDL 07/15/20 1016   Line Status Infusing 07/15/20 1016   Phlebitis Scale 0-->no symptoms 07/15/20 1016   Infiltration Scale 0 07/15/20 1016   Extravasation? No 07/15/20 1016   Dressing Intervention New dressing  07/15/20 1016   Number of days: 0        Past Medical History:   Diagnosis Date     Breast cancer " (H)      Complication of anesthesia      PONV (postoperative nausea and vomiting)       History reviewed. No pertinent surgical history.   Allergies   Allergen Reactions     No Known Drug Allergies         Anesthesia Evaluation     . Pt has had prior anesthetic.     History of anesthetic complications   - PONV        ROS/MED HX    ENT/Pulmonary:     (+)tobacco use, Past use , . .    Neurologic:       Cardiovascular:         METS/Exercise Tolerance:     Hematologic:         Musculoskeletal:         GI/Hepatic:         Renal/Genitourinary:         Endo:         Psychiatric:         Infectious Disease:         Malignancy:   (+) Malignancy History of Breast  Breast CA Active status post.         Other:                         PHYSICAL EXAM:   Mental Status/Neuro: A/A/O   Airway:   Mallampati: I  Mouth/Opening: Full  TM distance: > 6 cm  Neck ROM: Full   Respiratory: Auscultation: CTAB     Resp. Rate: Normal     Resp. Effort: Normal      CV: Rhythm: Regular  Rate: Age appropriate  Heart: Normal Sounds  Edema: None   Comments:      Dental: Normal Dentition                Assessment:   ASA SCORE: 3    H&P: History and physical reviewed and following examination; no interval change.   Smoking Status:  Non-Smoker/Unknown   NPO Status: NPO Appropriate     Plan:   Anes. Type:  MAC   Pre-Medication: Acetaminophen   Induction:  IV (Standard)   Airway: Native Airway   Access/Monitoring: PIV   Maintenance: Propofol Sedation     Postop Plan:   Postop Pain: None  Postop Sedation/Airway: Not planned  Disposition: Outpatient     PONV Management:   Adult Risk Factors: Female, H/o PONV or Motion Sickness, Non-Smoker   Prevention:, Propofol, No Volatiles     CONSENT: Direct conversation   Plan and risks discussed with: Patient   Blood Products: Consent Deferred (Minimal Blood Loss)                   Flaquito Peters MD  Staff Anesthesiologist  [phone]

## 2020-07-15 NOTE — PROGRESS NOTES
Met with Leah over doximity video to discuss chemotherapy and resources available at the [facility].  Via Oncology printouts on [facility] were provided over [employer]. Reviewed administration, side effects (including myelosuppression, nausea/vomiting, diarrhea/constipation, hair loss, mouth sores) and ongoing symptom management by APPs in clinic. Provided phone numbers for triage and after hours care. Highlighted steps to expect when getting chemotherapy (check in, labs, pre- meds, infusion), Discussed that chemo treatment may be delayed a day or two due to blood counts, infusion schedule or patient's need to modify. Included a one page resource of symptoms and when to contact the Cancer Clinic with questions or concerns.      Leah verbalized Authorization to Discuss PHI with her  Roland Rangel and sister Kavitha Vasquez. Discussed use of MyChart to assist in managing appointments and asking future questions of health care team, she verbalized understanding to not ise MyChart for urgent or symptomatic matters.     Leah is interested in a nutrition and cancer rehab referral.     Leah reports she does not eat well when she is stressed, as such she has lost approx 10 lbs during her diagnosis. She is now at 100 lbs. Discussed importance of nutrition during treatment. She is motivated to do what it takes to eat and maintain weight    Leah has several predispositions that make her high risk for CINV; anxiety, sensitive stomach, trouble tolerating medications the greatest of these is the anxiety. Discussed using antiemetics around the clock for 3-4 days after chemo along with non-pharmaceutical methods. Discussed that given her weight she has a low threshold for losing weight and becoming dehydrated. Encouraged her to call clinic right away if she develops uncontrolled n/v, inability to eat or drink.     Leah is having difficulty sleeping due to continued tumor growth and pain in her breast.  This is also increasing her anxiety. Discussed that after she get chemo the tumor will decrease however the first few days the tumor may become more painful from inflammation as the chemo attacks it.         Answered all Leah's questions to her stated satisfaction.

## 2020-07-15 NOTE — OP NOTE
Right breast cancer. Single lumen port placement requested for access.    Radiologist: Darren    Sedation by Sedation team.    20 mL 1% lidocaine without epinephrine.    Consent obtained.    Left neck and chest prepped and draped in the usual sterile fashion. 1% lidocaine for local anesthesia.    Left internal jugular venotomy made under ultrasound guidance.    Catheter length measured with guidewire.    Subcutaneous pocket created over left anterior chest. Pocket liberally irrigated with sterile saline.     Catheter tunneled from the pocket to the venotomy site.    Catheter cut on the 28 cm marker.    Port placed in pocket. Catheter advanced through peel-away sheath to high right atrium.    Peel away sheath removed.    Pocket closed with single 4-0 vicryl subcutaneous suture, running 4-0 monocryl subcuticular suture and Enfit glue. Venotomy site closed with Enfit glue.    Port aspirated and flushed adequately. Port heparin locked. No immediate complication.    EBL: 5 mL    Fluoroscopy time: 14 s

## 2020-07-15 NOTE — ANESTHESIA POSTPROCEDURE EVALUATION
Anesthesia POST Procedure Evaluation    Patient: Leah Rangel   MRN:     [id number] Gender:   female   Age:    40 year old :      1980        Preoperative Diagnosis: Malignant neoplasm of right breast (H) [C50.911]   Procedure(s):  Port Placement   Postop Comments: No value filed.     Anesthesia Type: MAC       Disposition: Outpatient   Postop Pain Control: Uneventful            Sign Out: Well controlled pain   PONV: No   Neuro/Psych: Uneventful            Sign Out: Acceptable/Baseline neuro status   Airway/Respiratory: Uneventful            Sign Out: Acceptable/Baseline resp. status   CV/Hemodynamics: Uneventful            Sign Out: Acceptable CV status   Other NRE: NONE   DID A NON-ROUTINE EVENT OCCUR? No         Last Anesthesia Record Vitals:  CRNA VITALS  7/15/2020 1118 - 7/15/2020 1218      7/15/2020             Pulse:  60    Ht Rate:  64    SpO2:  100 %    Resp Rate (observed):  12          Last PACU Vitals:  Vitals Value Taken Time   /75 7/15/2020 11:55 AM   Temp 36.7  C (98  F) 7/15/2020 11:55 AM   Pulse     Resp 16 7/15/2020 11:55 AM   SpO2 99 % 7/15/2020 11:55 AM   Temp src Available 7/15/2020 11:47 AM   NIBP 111/74 7/15/2020 11:46 AM   Pulse 60 7/15/2020 11:48 AM   SpO2 100 % 7/15/2020 11:48 AM   Resp     Temp     Ht Rate 64 7/15/2020 11:48 AM   Temp 2           Electronically Signed By: Flaquito Peters MD, July 15, 2020, 1:04 PM

## 2020-07-15 NOTE — H&P
Limited H&P    Right breast cancer. Single lumen port placement requested.    No interval changes to H&P.    Labs reviewed. No helpful previous images.    Consent obtained.

## 2020-07-15 NOTE — DISCHARGE INSTRUCTIONS
A collaboration between [facility] and [facility]  Experts in minimally invasive, targeted treatments performed using imaging guidance    Venous Access Device,  Port Catheter or Tunneled or Non-Tunneled Central Line Placement    Today you had a procedure today to install a venous access device; either a tunneled central vein catheter or a subcutaneous port catheter.      After you go home:  - Drink plenty of fluids.  Generally 6-8 (8 ounce) glasses a day is recommended.  - Resume your regular diet unless otherwise ordered by a medical provider.  - Keep any applied tape/gauze dressings clean and dry.  Change tape/gauze dressings if they get wet or soiled.  - You may shower the following day after procedure, however cover and protect from moisture any tape/gauze dressings.  You may let water hit and run over dried skin glue, but do not scrub.  Pat the area dry after showering.  - Port placement incisions are closed with absorbable suture, meaning they do not need to be removed at a later date, and a topical skin adhesive (skin glue).  This glue will wear off in 7-14 days.  Do not remove before this time.  If 14 days have passed and residual glue is present, you may gently remove it.  - Do not apply gels, lotions, or ointments to the glue site for the first 10 days as this may cause the glue to prematurely soften and fail.  - Do not perform strenuous activities or lift greater than 10 pounds for the next three days.  - If there is bleeding or oozing from the procedure site, apply firm pressure to the area for 5-10 minutes.  If the bleeding continues seek medical advice at the numbers below.  - Mild procedure site discomfort can be treated with an ice pack and over-the-counter pain relievers.        For 24 hours after any sedation used:  - Relax and take it easy.  No strenuous activities.  - Do not drive or operate machines at home or at work.  - No alcohol  consumption.  - Do not make any important or legal decisions.    Call our Interventional Radiology (IR) service if:  - If you start bleeding from the procedure site.  If you do start to bleed from the site, lie down and hold some pressure on the site.  Our radiology provider can help you decide if you need to return to the hospital.  - If you have new or worsening pain related to the procedure.  - If you have concerning swelling at the procedure site.  - If you develop persistent nausea or vomiting.  - If you develop hives or a rash or any unexplained itching.  - If you have a fever of greater than 100.5  F and chills in the first 5 days after procedure.  - Any other concerns related to your procedure.      [facility]  Interventional Radiology (IR)  [address]  [address] [facility] [place] [address]    Contact Number:  [phone]  (IR control desk)  - Monday - Friday 8:00 am - 4:30 pm    After hours for urgent concerns:  [phone]  - After 4:30 pm Monday - Friday, Weekends and Holidays.   - Ask for Interventional Radiology on-call.  Someone is available 24 hours a day.  - [facility] toll free number:  [phone]

## 2020-07-15 NOTE — ANESTHESIA CARE TRANSFER NOTE
Patient: Leah Rangel    Procedure(s):  Port Placement    Diagnosis: Malignant neoplasm of right breast (H) [C50.911]  Diagnosis Additional Information: No value filed.    Anesthesia Type:   MAC     Note:  Airway :Room Air  Patient transferred to:Phase II  Handoff Report: Identifed the Patient, Identified the Reponsible Provider, Reviewed the pertinent medical history, Discussed the surgical course, Reviewed Intra-OP anesthesia mangement and issues during anesthesia, Set expectations for post-procedure period and Allowed opportunity for questions and acknowledgement of understanding      Vitals: (Last set prior to Anesthesia Care Transfer)    CRNA VITALS  7/15/2020 1118 - 7/15/2020 1158      7/15/2020             Pulse:  60    Ht Rate:  64    SpO2:  100 %    Resp Rate (observed):  12                Electronically Signed By: ADAMA Blackburn CRNA  July 15, 2020  11:58 AM

## 2020-07-16 ENCOUNTER — HOSPITAL ENCOUNTER (OUTPATIENT)
Dept: PET IMAGING | Facility: CLINIC | Age: 40
Discharge: HOME | End: 2020-07-16
Attending: INTERNAL MEDICINE | Admitting: INTERNAL MEDICINE
Payer: COMMERCIAL

## 2020-07-16 DIAGNOSIS — C50.911 MALIGNANT NEOPLASM OF RIGHT FEMALE BREAST, UNSPECIFIED ESTROGEN RECEPTOR STATUS, UNSPECIFIED SITE OF BREAST (H): ICD-10-CM

## 2020-07-16 LAB — COPATH REPORT: NORMAL

## 2020-07-16 PROCEDURE — 25000128 H RX IP 250 OP 636: Performed by: INTERNAL MEDICINE

## 2020-07-16 PROCEDURE — A9552 F18 FDG: HCPCS | Performed by: INTERNAL MEDICINE

## 2020-07-16 PROCEDURE — 74177 CT ABD & PELVIS W/CONTRAST: CPT | Mod: PI

## 2020-07-16 PROCEDURE — 34300033 ZZH RX 343: Performed by: INTERNAL MEDICINE

## 2020-07-16 RX ORDER — ACETAMINOPHEN 325 MG/1
650 TABLET ORAL ONCE
Status: CANCELLED | OUTPATIENT
Start: 2020-07-17

## 2020-07-16 RX ORDER — IOPAMIDOL 755 MG/ML
20-135 INJECTION, SOLUTION INTRAVASCULAR ONCE
Status: COMPLETED | OUTPATIENT
Start: 2020-07-16 | End: 2020-07-16

## 2020-07-16 RX ORDER — DIPHENHYDRAMINE HYDROCHLORIDE 50 MG/ML
50 INJECTION INTRAMUSCULAR; INTRAVENOUS
Status: CANCELLED
Start: 2020-07-17

## 2020-07-16 RX ORDER — METHYLPREDNISOLONE SODIUM SUCCINATE 125 MG/2ML
125 INJECTION, POWDER, LYOPHILIZED, FOR SOLUTION INTRAMUSCULAR; INTRAVENOUS
Status: CANCELLED
Start: 2020-07-17

## 2020-07-16 RX ORDER — SODIUM CHLORIDE 9 MG/ML
1000 INJECTION, SOLUTION INTRAVENOUS CONTINUOUS PRN
Status: CANCELLED
Start: 2020-07-17

## 2020-07-16 RX ORDER — HEPARIN SODIUM (PORCINE) LOCK FLUSH IV SOLN 100 UNIT/ML 100 UNIT/ML
5 SOLUTION INTRAVENOUS
Status: CANCELLED | OUTPATIENT
Start: 2020-07-17

## 2020-07-16 RX ORDER — NALOXONE HYDROCHLORIDE 0.4 MG/ML
.1-.4 INJECTION, SOLUTION INTRAMUSCULAR; INTRAVENOUS; SUBCUTANEOUS
Status: CANCELLED | OUTPATIENT
Start: 2020-07-17

## 2020-07-16 RX ORDER — ALBUTEROL SULFATE 0.83 MG/ML
2.5 SOLUTION RESPIRATORY (INHALATION)
Status: CANCELLED | OUTPATIENT
Start: 2020-07-17

## 2020-07-16 RX ORDER — DIPHENHYDRAMINE HCL 50 MG
50 CAPSULE ORAL ONCE
Status: CANCELLED | OUTPATIENT
Start: 2020-07-17

## 2020-07-16 RX ORDER — EPINEPHRINE 1 MG/ML
0.3 INJECTION, SOLUTION, CONCENTRATE INTRAVENOUS EVERY 5 MIN PRN
Status: CANCELLED | OUTPATIENT
Start: 2020-07-17

## 2020-07-16 RX ORDER — HEPARIN SODIUM,PORCINE 10 UNIT/ML
5 VIAL (ML) INTRAVENOUS
Status: CANCELLED | OUTPATIENT
Start: 2020-07-17

## 2020-07-16 RX ORDER — MEPERIDINE HYDROCHLORIDE 25 MG/ML
25 INJECTION INTRAMUSCULAR; INTRAVENOUS; SUBCUTANEOUS EVERY 30 MIN PRN
Status: CANCELLED | OUTPATIENT
Start: 2020-07-17

## 2020-07-16 RX ORDER — LORAZEPAM 2 MG/ML
0.5 INJECTION INTRAMUSCULAR EVERY 4 HOURS PRN
Status: CANCELLED
Start: 2020-07-17

## 2020-07-16 RX ORDER — ALBUTEROL SULFATE 90 UG/1
1-2 AEROSOL, METERED RESPIRATORY (INHALATION)
Status: CANCELLED
Start: 2020-07-17

## 2020-07-16 RX ADMIN — IOPAMIDOL 62 ML: 755 INJECTION, SOLUTION INTRAVENOUS at 10:20

## 2020-07-16 RX ADMIN — FLUDEOXYGLUCOSE F-18 10.03 MCI.: 500 INJECTION, SOLUTION INTRAVENOUS at 10:20

## 2020-07-16 NOTE — PROGRESS NOTES
Reason for Visit: f/u R breast cancer    Oncology HPI: Leah Rangel is a  40 year old, premenopausal female, diagnosed with new R breast cancer.     Leah reports that she has a history of bilateral implants.  She noticed a palpable mass involving her right breast in the upper inner quadrant in early 06/2020.  At that time, she went in for an evaluation with a mammogram and ultrasound.  Mammogram on 06/11/2020 revealed a 7 mm mass at the 1 o'clock position approximately 5 cm from the nipple in the upper inner quadrant of the right breast.  There was a second area measuring 6 x 5 x 4 mm at the 1 o'clock position 6 cm from the nipple.  In the left breast, there was an area of microcalcifications.  She was referred for a right breast biopsy.  Both areas at the 1 o'clock position were biopsied.  No lymph nodes were appreciated.  Both of these biopsies revealed an invasive ductal carcinoma, grade 3, ER positive, WV positive, HER-2 positive, 3+ by IHC.  She met with a surgeon, a plastic surgeon as well as a medical oncologist, Dr. Adelso Balderas.  She was discussed having neoadjuvant chemotherapy followed by bilateral mastectomy and reconstruction.  She is anxious to get started with therapy.  She has been discussing getting a second opinion and as a result comes to see me.  At the time of evaluation by Dr. Adelso Balderas, she was felt to have a palpable right axillary lymph node measuring 1.5 cm and was considered to have a T1b multifocal N1 breast cancer.      On evaluation, her imaging is as described above.  She was subsequently referred for a breast MRI.  The BMRI done on 7/10/20 showed a 4.5 x 2.4 x 3 cm mass in the R upper, inner breast. She underwent a PET/CT yesterday and is planned for neoadjuvant treatment with Docetaxel/Carboplatin/Trastuzumab/Pertuzumab (TCHP) today.    Interval history: Leah is feeling ready, but anxious about starting treatment today. She has been trying to figure out how to cope with her  "diagnosis, feels disconnected and that the experience is unreal at times. Has been trying to help her 2 kids (16 and 8) learn about her cancer and in contact with many family members and friends who are trying to cope with this as well. She is \"the glue\" that holds people together and she is realizing that she may need to set some boundaries. The stress has caused her to lose about 10 lb, but she has gained about 5 lb back. Starting to eat a little more often. Has noted progression of the tumor on the right breast, feels larger and the skin is more sensitive. Feels like a sunburn.     No fevers/chills, cough, sob, palpitation, dizziness. Bowel/bladder function wnl. Had her Mirena removed and is having moderate vaginal bleeding this week. No cramps/clots. No edema, rash, bruising. No neuropathy.    Stopped smoking as of 7/1/20.     Current Outpatient Medications   Medication Sig Dispense Refill     diazepam (VALIUM) 5 MG tablet Take one tablet one hour prior to scan. May repeat dose one time. 2 tablet 0          Allergies   Allergen Reactions     No Known Drug Allergies          Exam: alert ,oriented, pleasant. Tearful at times.  Blood pressure 127/87, pulse 88, temperature 97.7  F (36.5  C), temperature source Oral, resp. rate 18, height 1.626 m (5' 4\"), weight 46.7 kg (102 lb 13.6 oz), SpO2 99 %.  Wt Readings from Last 4 Encounters:   07/15/20 45.8 kg (101 lb)   07/15/20 45.4 kg (100 lb)   07/09/20 51.7 kg (114 lb)   07/14/04 44.9 kg (99 lb)     Oropharynx is moist and without lesion. Neck supple and without adenopathy. Lungs:CTA. Heart: RRR, no murmur or rub. Abdomen: soft, nontender, BS active, no masses or organomegaly.  Extremities: warm, no edema. Speech is clear. CN wnl. Gait/station wnl. Skin: no rash. Port in the L chest with glue intact to the incision. Well approximated.  Nodes: no neck, supraclavicular or axillae nodes palpable. Breast: bilateral breast implants noted. No masses on the L breast. On the " upper, inner quadrant of the R breast there is an irregular mass that is best palpated in an upright and slightly forward position. The mass measures 4 cm in width and extends 7 cm in cranial/caudal dimension      Labs: Results for CORNELIO ARCOS (MRN [id number]) as of 7/17/2020 11:51   Ref. Range 7/13/2020 10:25   Sodium Latest Ref Range: 133 - 144 mmol/L 136   Potassium Latest Ref Range: 3.4 - 5.3 mmol/L 4.1   Chloride Latest Ref Range: 94 - 109 mmol/L 103   Carbon Dioxide Latest Ref Range: 20 - 32 mmol/L 27   Urea Nitrogen Latest Ref Range: 7 - 30 mg/dL 6 (L)   Creatinine Latest Ref Range: 0.52 - 1.04 mg/dL 0.56   GFR Estimate Latest Ref Range: >60 mL/min/1.73_m2 >90   GFR Estimate If Black Latest Ref Range: >60 mL/min/1.73_m2 >90   Calcium Latest Ref Range: 8.5 - 10.1 mg/dL 8.8   Anion Gap Latest Ref Range: 3 - 14 mmol/L 6   Albumin Latest Ref Range: 3.4 - 5.0 g/dL 3.9   Protein Total Latest Ref Range: 6.8 - 8.8 g/dL 7.5   Bilirubin Total Latest Ref Range: 0.2 - 1.3 mg/dL 0.4   Alkaline Phosphatase Latest Ref Range: 40 - 150 U/L 60   ALT Latest Ref Range: 0 - 50 U/L 35   AST Latest Ref Range: 0 - 45 U/L 36   Vitamin D Deficiency screening Latest Ref Range: 20 - 75 ug/L 61   Glucose Latest Ref Range: 70 - 99 mg/dL 83   WBC Latest Ref Range: 4.0 - 11.0 10e9/L 7.7   Hemoglobin Latest Ref Range: 11.7 - 15.7 g/dL 14.4   Hematocrit Latest Ref Range: 35.0 - 47.0 % 41.7   Platelet Count Latest Ref Range: 150 - 450 10e9/L 185   RBC Count Latest Ref Range: 3.8 - 5.2 10e12/L 3.89   MCV Latest Ref Range: 78 - 100 fl 107 (H)   MCH Latest Ref Range: 26.5 - 33.0 pg 37.0 (H)   MCHC Latest Ref Range: 31.5 - 36.5 g/dL 34.5   RDW Latest Ref Range: 10.0 - 15.0 % 13.1   Diff Method Unknown Automated Method   % Neutrophils Latest Units: % 66.6   % Lymphocytes Latest Units: % 22.9   % Monocytes Latest Units: % 8.2   % Eosinophils Latest Units: % 1.2   % Basophils Latest Units: % 0.6   % Immature Granulocytes Latest Units: % 0.5    Nucleated RBCs Latest Ref Range: 0 /100 0   Absolute Neutrophil Latest Ref Range: 1.6 - 8.3 10e9/L 5.1   Absolute Lymphocytes Latest Ref Range: 0.8 - 5.3 10e9/L 1.8   Absolute Monocytes Latest Ref Range: 0.0 - 1.3 10e9/L 0.6   Absolute Eosinophils Latest Ref Range: 0.0 - 0.7 10e9/L 0.1   Absolute Basophils Latest Ref Range: 0.0 - 0.2 10e9/L 0.1   Abs Immature Granulocytes Latest Ref Range: 0 - 0.4 10e9/L 0.0   Absolute Nucleated RBC Unknown 0.0       Imaging: Combined Report of:    PET and CT on  7/16/2020 10:45 AM :     1. PET of the neck, chest, abdomen, and pelvis.  2. PET CT Fusion for Attenuation Correction and Anatomical  Localization:    3. Diagnostic CT scan of the chest, abdomen, and pelvis with  intravenous contrast for interpretation.  4. 3D MIP and PET-CT fused images were processed on an independent  workstation and archived to PACS and reviewed by a radiologist.     Technique:     1. PET: The patient received 10.03 mCi of F-18-FDG; the serum glucose  was 89 prior to administration, body weight was 45.8 kg. Images were  evaluated in the axial, sagittal, and coronal planes as well as the  rotational whole body MIP. Images were acquired from the Vertex to the  Feet.     UPTAKE WAS MEASURED AT 74 MINUTES.      BACKGROUND:  Liver SUV max= 2.3,   Aorta Blood SUV Max: 1.9.      2. CT: Volumetric acquisition for clinical interpretation of the  chest, abdomen, and pelvis acquired at 3 mm sections . The chest,  abdomen, and pelvis were evaluated at 5 mm sections in bone, soft  tissue, and lung windows.       The patient received 60 cc. Of Isovue 370 intravenously for the  examination.    --     3. 3D MIP and PET-CT fused images were processed on an independent  workstation and archived to PACS and reviewed by a radiologist.     INDICATION: Malignant neoplasm of right female breast     ADDITIONAL INFORMATION OBTAINED FROM EMR: 40-year-old female with  slightly diagnosed invasive ductal carcinoma of the upper  inner  quadrant of the right breast. PET/CT for staging.     COMPARISON: None.     FINDINGS:      HEAD/NECK:  There is no  suspicious FDG uptake in the neck.      The paranasal sinuses are clear. The mastoid air cells are clear.      The mucosal pharyngeal space, the , prevertebral and carotid  spaces are within normal limits.      No masses, mass effect or pathologically enlarged lymph nodes are  evident. The thyroid gland is normal.     CHEST:  Prominent nodular enhancement and hypermetabolic activity in the upper  inner quadrant of the right breast. There is a focal enhancing and  hypermetabolic area with adjacent biopsy clip measuring 9 x 5 mm with  max SUV 4.1 (series 5 image 182). Prominent right axillary lymph node  with preservation of fatty hilum measuring 8 mm with maximum SUV 1.8,  less than both liver parenchyma and blood pool uptake. No lymph nodes  with greater than background hypermetabolic activity. No pulmonary  parenchymal consolidation. Dependent atelectasis. No pleural effusion  or pneumothorax. 3 mm pleural-based nodule in the right upper lobe  (series 9 image 53). The central tracheobronchial tree is patent.     Heart size is normal. No pericardial effusion. Normal caliber of the  aorta and main pulmonary artery. Normal branching pattern of the great  vessels. Left IJ Port-A-Cath tip terminates in the right atrium.     ABDOMEN AND PELVIS:  There is no suspicious FDG uptake in the abdomen or pelvis.     There are no suspicious hepatic lesions. Nonhypermetabolic  hypodensities in the left hepatic lobe, likely representing hepatic  cysts. Hypoattenuation along the falciform ligament representing focal  fatty infiltration. Normal gallbladder, pancreas, and spleen. No  adrenal nodules. Symmetric enhancement of the kidneys. No  hydronephrosis or hydroureter. Bladder is moderately distended and  unremarkable. Hypermetabolic activity in the endometrial canal with  max SUV of 5.6 without focal  abnormality on CT. Normal ovaries.     Normal caliber of the small and large bowel's. No free air or free  fluid in the abdomen or pelvis. Normal caliber of the infrarenal  aorta. Patent portal vein. No abdominal or pelvic lymphadenopathy.      LOWER EXTREMITIES:   No abnormal masses or hypermetabolic lesions.     BONES:   There are no suspicious lytic or blastic osseous lesions.  There is no  abnormal FDG uptake in the skeleton.                                                                      IMPRESSION:   1. Invasive ductal carcinoma of the upper inner quadrant of the right  breast. No hypermetabolic axillary lymph nodes to suggest metastatic  disease.  2. No evidence of metastatic disease in the neck, chest, abdomen, or  pelvis.  3. 3 mm pleural-based right upper lobe pulmonary nodule. Recommend  attention on follow-up.     I have personally reviewed the examination and initial interpretation  and I agree with the findings.     BRODY BARNHART MD    Reading Physician  Reading Date  Result Priority    Kam Hook MD  [phone]  2020        Narrative & Impression      [id number]  [id number]  [id number]  [id number]^MICHELET^TOI^JOHNNY           [facility]  Diagnostic and [facility] Floor  [address] [place] [address]     Name: CORNELIO ARCOS  MRN: [id number]  : 1980  Study Date: 2020 09:10 AM  Age: 40 yrs  Gender: Female  Patient Location: [facility]  Reason For Study: Breast cancer (H), Malignant neoplasm of right female  breast, un  Ordering Physician: TOI TAFOYA  Referring Physician: TOI TAFOYA  Performed By: Felicia Grande RDCS     BSA: 1.5 m2  Height: 64 in  Weight: 114 lb  HR: 74  BP: 107/73 mmHg  _____________________________________________________________________________  __        Procedure  Echocardiogram with two-dimensional, color and spectral Doppler  performed.  _____________________________________________________________________________  __        Interpretation Summary  Left ventricular function, chamber size, wall motion, and wall thickness are  normal.The EF is 60-65%.  Global peak LV longitudinal strain is averaged at -19.2%. This is within  reported normal limits (normal <-18%).  No pericardial effusion is present.  IVC diameter <2.1 cm collapsing >50% with sniff suggests a normal RA pressure  of 3 mmHg.  _____________________________________________________________________________  __        Left Ventricle  Left ventricular function, chamber size, wall motion, and wall thickness are  normal.The EF is 60-65%. LVEF 64% based on biplane 2D tracing. Global peak LV  longitudinal strain is averaged at -19.2%. This is within reported normal  limits (normal <-18%). Left ventricular diastolic function is normal.     Right Ventricle  Right ventricular function, chamber size, wall motion, and thickness are  normal.     Atria  Both atria appear normal.     Mitral Valve  The mitral valve is normal.        Aortic Valve  The valve leaflets are not well visualized. On Doppler interrogation, there is  no significant stenosis or regurgitation.     Tricuspid Valve  The tricuspid valve is normal. The peak velocity of the tricuspid regurgitant  jet is not obtainable.     Pulmonic Valve  On Doppler interrogation, there is no significant stenosis or regurgitation.     Vessels  The aorta root is normal. IVC diameter <2.1 cm collapsing >50% with sniff  suggests a normal RA pressure of 3 mmHg.     Pericardium  No pericardial effusion is present.        Compared to Previous Study  There is no prior study for direct comparison.  _____________________________________________________________________________  __  MMode/2D Measurements & Calculations  IVSd: 0.70 cm     LVIDd: 4.1 cm  LVIDs: 2.5 cm  LVPWd: 0.81 cm  FS: 38.5 %  LV mass(C)d: 90.7 grams  LV mass(C)dI: 58.9 grams/m2  LA  Volume (BP): 39.2 ml  LA Volume Index (BP): 25.5 ml/m2  RWT: 0.40        Doppler Measurements & Calculations  MV E max prashant: 98.0 cm/sec  MV A max prashant: 93.1 cm/sec  MV E/A: 1.1  MV dec slope: 818.4 cm/sec2  MV dec time: 0.12 sec     PA acc time: 0.18 sec  E/E' av.4  Lateral E/e': 5.8  Medial E/e': 8.9     _____________________________________________________________________________  __           Report approved by: Klaus Oliver 2020 09:55 AM         Impression/plan:     40 year old, premenopausal female w/ hx of tobacco and ETOH use, bilateral breast implants with T1bN0, multifocal, ER/AR +, HER-2 positive R upper/inner quadrant breast cancer  -reviewed the PET/CT scan and plan to initiate neoadjuvant treatment with TCHP. There is no sylvie or distant metastases noted.  - Treatment is given every 3 weeks. Discussed the rationale of neoadjuvant treatment to reduce risk of tumor spread and to downsize the tumor. Reviewed a plan to monitor her tumor size through treatment. Tumor is palpable. Will check CEA and Ca 27.29 today  -reviewed side effects including N/V, alopecia, neuropathy, cytopenias, risk of infection, bleeding, fatigue, anorexia, diarrhea, rash, cardiotoxicity, risk of allergic reaction  -reviewed use of antiemetics including zofran, compazine, ativan and use of dexamethasone 8 mg bid starting the evening after docetaxel and continuing for 4 additional doses  -discussed chemotherapy precautions, reviewed need to use contraception if sexually active. She notes she is not interested in sex at this time.  -after our conversation, she felt comfortable proceeding with treatment  -I will f/u with her prior to the next cycle to assess her tolerance    Cardiac monitoring  -pretreatment echo on 20 shows EF of 60-65%, repeat echo in 3 months    ETOH/tobacco use  -congratulated her on stopping smoking. Discussed abstinence from ETOH during treatment    Mood/coping  -she is coming to terms with  her diagnosis, is able to verbalize her fears and has started to think of strategies that might help her cope and set boundaries with friends and family  -will continue to offer support    RUL pleural based pulm nodule  -indeterminate, will need f/u imaging in a few months

## 2020-07-17 ENCOUNTER — ONCOLOGY VISIT (OUTPATIENT)
Dept: ONCOLOGY | Facility: CLINIC | Age: 40
End: 2020-07-17
Attending: INTERNAL MEDICINE
Payer: COMMERCIAL

## 2020-07-17 VITALS
OXYGEN SATURATION: 99 % | TEMPERATURE: 97.7 F | RESPIRATION RATE: 18 BRPM | DIASTOLIC BLOOD PRESSURE: 87 MMHG | BODY MASS INDEX: 17.56 KG/M2 | HEART RATE: 88 BPM | WEIGHT: 102.85 LBS | SYSTOLIC BLOOD PRESSURE: 127 MMHG | HEIGHT: 64 IN

## 2020-07-17 DIAGNOSIS — Z17.0 MALIGNANT NEOPLASM OF UPPER-INNER QUADRANT OF RIGHT BREAST IN FEMALE, ESTROGEN RECEPTOR POSITIVE (H): Primary | ICD-10-CM

## 2020-07-17 DIAGNOSIS — L65.9 ALOPECIA: ICD-10-CM

## 2020-07-17 DIAGNOSIS — C50.911 MALIGNANT NEOPLASM OF RIGHT FEMALE BREAST, UNSPECIFIED ESTROGEN RECEPTOR STATUS, UNSPECIFIED SITE OF BREAST (H): ICD-10-CM

## 2020-07-17 DIAGNOSIS — C50.211 MALIGNANT NEOPLASM OF UPPER-INNER QUADRANT OF RIGHT BREAST IN FEMALE, ESTROGEN RECEPTOR POSITIVE (H): Primary | ICD-10-CM

## 2020-07-17 DIAGNOSIS — C50.911 MALIGNANT NEOPLASM OF RIGHT FEMALE BREAST, UNSPECIFIED ESTROGEN RECEPTOR STATUS, UNSPECIFIED SITE OF BREAST (H): Primary | ICD-10-CM

## 2020-07-17 LAB
CANCER AG27-29 SERPL-ACNC: <5 U/ML (ref 0–39)
CEA SERPL-MCNC: 1.1 UG/L (ref 0–2.5)

## 2020-07-17 PROCEDURE — 25000128 H RX IP 250 OP 636: Mod: ZF | Performed by: INTERNAL MEDICINE

## 2020-07-17 PROCEDURE — 96367 TX/PROPH/DG ADDL SEQ IV INF: CPT

## 2020-07-17 PROCEDURE — 96413 CHEMO IV INFUSION 1 HR: CPT

## 2020-07-17 PROCEDURE — 96375 TX/PRO/DX INJ NEW DRUG ADDON: CPT

## 2020-07-17 PROCEDURE — 25800030 ZZH RX IP 258 OP 636: Mod: ZF | Performed by: INTERNAL MEDICINE

## 2020-07-17 PROCEDURE — G0463 HOSPITAL OUTPT CLINIC VISIT: HCPCS | Mod: ZF

## 2020-07-17 PROCEDURE — 86300 IMMUNOASSAY TUMOR CA 15-3: CPT | Performed by: NURSE PRACTITIONER

## 2020-07-17 PROCEDURE — 99214 OFFICE O/P EST MOD 30 MIN: CPT | Mod: ZP | Performed by: NURSE PRACTITIONER

## 2020-07-17 PROCEDURE — 82378 CARCINOEMBRYONIC ANTIGEN: CPT | Performed by: NURSE PRACTITIONER

## 2020-07-17 PROCEDURE — 25000125 ZZHC RX 250: Mod: ZF | Performed by: INTERNAL MEDICINE

## 2020-07-17 PROCEDURE — 25000132 ZZH RX MED GY IP 250 OP 250 PS 637: Mod: ZF | Performed by: INTERNAL MEDICINE

## 2020-07-17 PROCEDURE — 96417 CHEMO IV INFUS EACH ADDL SEQ: CPT

## 2020-07-17 RX ORDER — LORAZEPAM 0.5 MG/1
0.5 TABLET ORAL EVERY 4 HOURS PRN
Qty: 30 TABLET | Refills: 5 | Status: SHIPPED | OUTPATIENT
Start: 2020-07-17 | End: 2020-11-22

## 2020-07-17 RX ORDER — DIPHENHYDRAMINE HCL 25 MG
50 CAPSULE ORAL ONCE
Status: COMPLETED | OUTPATIENT
Start: 2020-07-17 | End: 2020-07-17

## 2020-07-17 RX ORDER — LIDOCAINE/PRILOCAINE 2.5 %-2.5%
CREAM (GRAM) TOPICAL PRN
Qty: 30 G | Refills: 4 | Status: SHIPPED | OUTPATIENT
Start: 2020-07-17

## 2020-07-17 RX ORDER — DEXAMETHASONE 4 MG/1
8 TABLET ORAL 2 TIMES DAILY
Qty: 10 TABLET | Refills: 5 | Status: SHIPPED | OUTPATIENT
Start: 2020-07-17 | End: 2020-11-22

## 2020-07-17 RX ORDER — PROCHLORPERAZINE MALEATE 10 MG
10 TABLET ORAL EVERY 6 HOURS PRN
Qty: 30 TABLET | Refills: 5 | Status: SHIPPED | OUTPATIENT
Start: 2020-07-17 | End: 2020-11-22

## 2020-07-17 RX ORDER — LORAZEPAM 0.5 MG/1
0.5 TABLET ORAL EVERY 4 HOURS PRN
Qty: 30 TABLET | Refills: 5 | Status: SHIPPED | OUTPATIENT
Start: 2020-07-17 | End: 2020-07-17

## 2020-07-17 RX ORDER — ACETAMINOPHEN 325 MG/1
650 TABLET ORAL ONCE
Status: COMPLETED | OUTPATIENT
Start: 2020-07-17 | End: 2020-07-17

## 2020-07-17 RX ORDER — HEPARIN SODIUM (PORCINE) LOCK FLUSH IV SOLN 100 UNIT/ML 100 UNIT/ML
5 SOLUTION INTRAVENOUS
Status: DISCONTINUED | OUTPATIENT
Start: 2020-07-17 | End: 2020-07-17 | Stop reason: HOSPADM

## 2020-07-17 RX ORDER — LORAZEPAM 2 MG/ML
0.5 INJECTION INTRAMUSCULAR EVERY 4 HOURS PRN
Status: DISCONTINUED | OUTPATIENT
Start: 2020-07-17 | End: 2020-07-17 | Stop reason: HOSPADM

## 2020-07-17 RX ORDER — ONDANSETRON 8 MG/1
8 TABLET, FILM COATED ORAL EVERY 8 HOURS PRN
Qty: 10 TABLET | Refills: 5 | Status: SHIPPED | OUTPATIENT
Start: 2020-07-17 | End: 2020-08-06

## 2020-07-17 RX ADMIN — PERTUZUMAB 840 MG: 30 INJECTION, SOLUTION, CONCENTRATE INTRAVENOUS at 10:24

## 2020-07-17 RX ADMIN — CARBOPLATIN 750 MG: 10 INJECTION, SOLUTION INTRAVENOUS at 14:20

## 2020-07-17 RX ADMIN — ACETAMINOPHEN 650 MG: 325 TABLET ORAL at 09:55

## 2020-07-17 RX ADMIN — DIPHENHYDRAMINE HYDROCHLORIDE 50 MG: 25 CAPSULE ORAL at 09:55

## 2020-07-17 RX ADMIN — FAMOTIDINE 20 MG: 10 INJECTION INTRAVENOUS at 09:57

## 2020-07-17 RX ADMIN — TRASTUZUMAB-ANNS 370 MG: 420 INJECTION, POWDER, LYOPHILIZED, FOR SOLUTION INTRAVENOUS at 11:32

## 2020-07-17 RX ADMIN — DOCETAXEL 100 MG: 20 INJECTION, SOLUTION, CONCENTRATE INTRAVENOUS at 13:07

## 2020-07-17 RX ADMIN — DEXAMETHASONE SODIUM PHOSPHATE: 10 INJECTION, SOLUTION INTRAMUSCULAR; INTRAVENOUS at 10:12

## 2020-07-17 RX ADMIN — SODIUM CHLORIDE 1000 ML: 9 INJECTION, SOLUTION INTRAVENOUS at 09:29

## 2020-07-17 RX ADMIN — Medication 5 ML: at 13:12

## 2020-07-17 RX ADMIN — LORAZEPAM 0.5 MG: 2 INJECTION INTRAMUSCULAR; INTRAVENOUS at 09:30

## 2020-07-17 ASSESSMENT — MIFFLIN-ST. JEOR: SCORE: 1116.52

## 2020-07-17 ASSESSMENT — PAIN SCALES - GENERAL: PAINLEVEL: NO PAIN (0)

## 2020-07-17 NOTE — PROGRESS NOTES
Infusion Nursing Note:  Leah Rangel presents today for C1 Perjeta-Kanjinti-Taxotere-Carboplatin.    Patient seen by provider today: Yes: Camila Spicer DNP   present during visit today: Not Applicable.    Note: Pt saw provider prior to infusion, ok for treatment.  First time getting chemotherapy today.Chemotherapy teaching, side effects, and schedule reviewed with patient. Pt instructed to call triage (or MD on call if after hours/weekends) with chills/temp >=100.5. Pt stated understanding of plan.     PRN Ativan 0.5 mg x 1 prior to infusion.    Intravenous Access:  Implanted Port.    Treatment Conditions:  Lab Results   Component Value Date    HGB 14.4 07/13/2020     Lab Results   Component Value Date    WBC 7.7 07/13/2020      Lab Results   Component Value Date    ANEU 5.1 07/13/2020     Lab Results   Component Value Date     07/13/2020      Lab Results   Component Value Date     07/13/2020                   Lab Results   Component Value Date    POTASSIUM 4.1 07/13/2020           No results found for: MAG         Lab Results   Component Value Date    CR 0.56 07/13/2020                   Lab Results   Component Value Date    URVASHI 8.8 07/13/2020                Lab Results   Component Value Date    BILITOTAL 0.4 07/13/2020           Lab Results   Component Value Date    ALBUMIN 3.9 07/13/2020                    Lab Results   Component Value Date    ALT 35 07/13/2020           Lab Results   Component Value Date    AST 36 07/13/2020       Results reviewed, labs MET treatment parameters, ok to proceed with treatment.  ECHO/MUGA completed 7/13  EF 60-65%.      Post Infusion Assessment:  Patient tolerated infusion without incident.  Blood return noted pre and post infusion.  Site patent and intact, free from redness, edema or discomfort.  No evidence of extravasations.  Access discontinued per protocol.       Discharge Plan:   Prescription refills given for DEX, Zofran, Compazine,  Ativan.  Discharge instructions reviewed with: Patient.  Patient and/or family verbalized understanding of discharge instructions and all questions answered.  AVS to patient via [facility].  Patient will return 8/7 for next appointment.   Patient discharged in stable condition accompanied by: self.  Departure Mode: Ambulatory.    Selene Hussein RN

## 2020-07-17 NOTE — LETTER
7/17/2020         RE: Leah Rangel  [address] [place] [address]        Dear Colleague,    Thank you for referring your patient, Leah Rangel, to the [facility]. Please see a copy of my visit note below.    Reason for Visit: f/u R breast cancer    Oncology HPI: Leah Rangel is a  40 year old, premenopausal female, diagnosed with new R breast cancer.     Leah reports that she has a history of bilateral implants.  She noticed a palpable mass involving her right breast in the upper inner quadrant in early 06/2020.  At that time, she went in for an evaluation with a mammogram and ultrasound.  Mammogram on 06/11/2020 revealed a 7 mm mass at the 1 o'clock position approximately 5 cm from the nipple in the upper inner quadrant of the right breast.  There was a second area measuring 6 x 5 x 4 mm at the 1 o'clock position 6 cm from the nipple.  In the left breast, there was an area of microcalcifications.  She was referred for a right breast biopsy.  Both areas at the 1 o'clock position were biopsied.  No lymph nodes were appreciated.  Both of these biopsies revealed an invasive ductal carcinoma, grade 3, ER positive, UT positive, HER-2 positive, 3+ by IHC.  She met with a surgeon, a plastic surgeon as well as a medical oncologist, Dr. Adelso Balderas.  She was discussed having neoadjuvant chemotherapy followed by bilateral mastectomy and reconstruction.  She is anxious to get started with therapy.  She has been discussing getting a second opinion and as a result comes to see me.  At the time of evaluation by Dr. Adelso Balderas, she was felt to have a palpable right axillary lymph node measuring 1.5 cm and was considered to have a T1b multifocal N1 breast cancer.      On evaluation, her imaging is as described above.  She was subsequently referred for a breast MRI.  The BMRI done on 7/10/20 showed a 4.5 x 2.4 x 3 cm mass in the R upper, inner breast. She underwent a PET/CT yesterday and is  "planned for neoadjuvant treatment with Docetaxel/Carboplatin/Trastuzumab/Pertuzumab (TCHP) today.    Interval history: Leah is feeling ready, but anxious about starting treatment today. She has been trying to figure out how to cope with her diagnosis, feels disconnected and that the experience is unreal at times. Has been trying to help her 2 kids (16 and 8) learn about her cancer and in contact with many family members and friends who are trying to cope with this as well. She is \"the glue\" that holds people together and she is realizing that she may need to set some boundaries. The stress has caused her to lose about 10 lb, but she has gained about 5 lb back. Starting to eat a little more often. Has noted progression of the tumor on the right breast, feels larger and the skin is more sensitive. Feels like a sunburn.     No fevers/chills, cough, sob, palpitation, dizziness. Bowel/bladder function wnl. Had her Mirena removed and is having moderate vaginal bleeding this week. No cramps/clots. No edema, rash, bruising. No neuropathy.    Stopped smoking as of 7/1/20.     Current Outpatient Medications   Medication Sig Dispense Refill     diazepam (VALIUM) 5 MG tablet Take one tablet one hour prior to scan. May repeat dose one time. 2 tablet 0          Allergies   Allergen Reactions     No Known Drug Allergies          Exam: alert ,oriented, pleasant. Tearful at times.  Blood pressure 127/87, pulse 88, temperature 97.7  F (36.5  C), temperature source Oral, resp. rate 18, height 1.626 m (5' 4\"), weight 46.7 kg (102 lb 13.6 oz), SpO2 99 %.  Wt Readings from Last 4 Encounters:   07/15/20 45.8 kg (101 lb)   07/15/20 45.4 kg (100 lb)   07/09/20 51.7 kg (114 lb)   07/14/04 44.9 kg (99 lb)     Oropharynx is moist and without lesion. Neck supple and without adenopathy. Lungs:CTA. Heart: RRR, no murmur or rub. Abdomen: soft, nontender, BS active, no masses or organomegaly.  Extremities: warm, no edema. Speech is clear. CN " wnl. Gait/station wnl. Skin: no rash. Port in the L chest with glue intact to the incision. Well approximated.  Nodes: no neck, supraclavicular or axillae nodes palpable. Breast: bilateral breast implants noted. No masses on the L breast. On the upper, inner quadrant of the R breast there is an irregular mass that is best palpated in an upright and slightly forward position. The mass measures 4 cm in width and extends 7 cm in cranial/caudal dimension      Labs: Results for CORNELIO ARCOS (MRN [id number]) as of 7/17/2020 11:51   Ref. Range 7/13/2020 10:25   Sodium Latest Ref Range: 133 - 144 mmol/L 136   Potassium Latest Ref Range: 3.4 - 5.3 mmol/L 4.1   Chloride Latest Ref Range: 94 - 109 mmol/L 103   Carbon Dioxide Latest Ref Range: 20 - 32 mmol/L 27   Urea Nitrogen Latest Ref Range: 7 - 30 mg/dL 6 (L)   Creatinine Latest Ref Range: 0.52 - 1.04 mg/dL 0.56   GFR Estimate Latest Ref Range: >60 mL/min/1.73_m2 >90   GFR Estimate If Black Latest Ref Range: >60 mL/min/1.73_m2 >90   Calcium Latest Ref Range: 8.5 - 10.1 mg/dL 8.8   Anion Gap Latest Ref Range: 3 - 14 mmol/L 6   Albumin Latest Ref Range: 3.4 - 5.0 g/dL 3.9   Protein Total Latest Ref Range: 6.8 - 8.8 g/dL 7.5   Bilirubin Total Latest Ref Range: 0.2 - 1.3 mg/dL 0.4   Alkaline Phosphatase Latest Ref Range: 40 - 150 U/L 60   ALT Latest Ref Range: 0 - 50 U/L 35   AST Latest Ref Range: 0 - 45 U/L 36   Vitamin D Deficiency screening Latest Ref Range: 20 - 75 ug/L 61   Glucose Latest Ref Range: 70 - 99 mg/dL 83   WBC Latest Ref Range: 4.0 - 11.0 10e9/L 7.7   Hemoglobin Latest Ref Range: 11.7 - 15.7 g/dL 14.4   Hematocrit Latest Ref Range: 35.0 - 47.0 % 41.7   Platelet Count Latest Ref Range: 150 - 450 10e9/L 185   RBC Count Latest Ref Range: 3.8 - 5.2 10e12/L 3.89   MCV Latest Ref Range: 78 - 100 fl 107 (H)   MCH Latest Ref Range: 26.5 - 33.0 pg 37.0 (H)   MCHC Latest Ref Range: 31.5 - 36.5 g/dL 34.5   RDW Latest Ref Range: 10.0 - 15.0 % 13.1   Diff Method  Unknown Automated Method   % Neutrophils Latest Units: % 66.6   % Lymphocytes Latest Units: % 22.9   % Monocytes Latest Units: % 8.2   % Eosinophils Latest Units: % 1.2   % Basophils Latest Units: % 0.6   % Immature Granulocytes Latest Units: % 0.5   Nucleated RBCs Latest Ref Range: 0 /100 0   Absolute Neutrophil Latest Ref Range: 1.6 - 8.3 10e9/L 5.1   Absolute Lymphocytes Latest Ref Range: 0.8 - 5.3 10e9/L 1.8   Absolute Monocytes Latest Ref Range: 0.0 - 1.3 10e9/L 0.6   Absolute Eosinophils Latest Ref Range: 0.0 - 0.7 10e9/L 0.1   Absolute Basophils Latest Ref Range: 0.0 - 0.2 10e9/L 0.1   Abs Immature Granulocytes Latest Ref Range: 0 - 0.4 10e9/L 0.0   Absolute Nucleated RBC Unknown 0.0       Imaging: Combined Report of:    PET and CT on  7/16/2020 10:45 AM :     1. PET of the neck, chest, abdomen, and pelvis.  2. PET CT Fusion for Attenuation Correction and Anatomical  Localization:    3. Diagnostic CT scan of the chest, abdomen, and pelvis with  intravenous contrast for interpretation.  4. 3D MIP and PET-CT fused images were processed on an independent  workstation and archived to PACS and reviewed by a radiologist.     Technique:     1. PET: The patient received 10.03 mCi of F-18-FDG; the serum glucose  was 89 prior to administration, body weight was 45.8 kg. Images were  evaluated in the axial, sagittal, and coronal planes as well as the  rotational whole body MIP. Images were acquired from the Vertex to the  Feet.     UPTAKE WAS MEASURED AT 74 MINUTES.      BACKGROUND:  Liver SUV max= 2.3,   Aorta Blood SUV Max: 1.9.      2. CT: Volumetric acquisition for clinical interpretation of the  chest, abdomen, and pelvis acquired at 3 mm sections . The chest,  abdomen, and pelvis were evaluated at 5 mm sections in bone, soft  tissue, and lung windows.       The patient received 60 cc. Of Isovue 370 intravenously for the  examination.    --     3. 3D MIP and PET-CT fused images were processed on an  independent  workstation and archived to PACS and reviewed by a radiologist.     INDICATION: Malignant neoplasm of right female breast     ADDITIONAL INFORMATION OBTAINED FROM EMR: 40-year-old female with  slightly diagnosed invasive ductal carcinoma of the upper inner  quadrant of the right breast. PET/CT for staging.     COMPARISON: None.     FINDINGS:      HEAD/NECK:  There is no  suspicious FDG uptake in the neck.      The paranasal sinuses are clear. The mastoid air cells are clear.      The mucosal pharyngeal space, the , prevertebral and carotid  spaces are within normal limits.      No masses, mass effect or pathologically enlarged lymph nodes are  evident. The thyroid gland is normal.     CHEST:  Prominent nodular enhancement and hypermetabolic activity in the upper  inner quadrant of the right breast. There is a focal enhancing and  hypermetabolic area with adjacent biopsy clip measuring 9 x 5 mm with  max SUV 4.1 (series 5 image 182). Prominent right axillary lymph node  with preservation of fatty hilum measuring 8 mm with maximum SUV 1.8,  less than both liver parenchyma and blood pool uptake. No lymph nodes  with greater than background hypermetabolic activity. No pulmonary  parenchymal consolidation. Dependent atelectasis. No pleural effusion  or pneumothorax. 3 mm pleural-based nodule in the right upper lobe  (series 9 image 53). The central tracheobronchial tree is patent.     Heart size is normal. No pericardial effusion. Normal caliber of the  aorta and main pulmonary artery. Normal branching pattern of the great  vessels. Left IJ Port-A-Cath tip terminates in the right atrium.     ABDOMEN AND PELVIS:  There is no suspicious FDG uptake in the abdomen or pelvis.     There are no suspicious hepatic lesions. Nonhypermetabolic  hypodensities in the left hepatic lobe, likely representing hepatic  cysts. Hypoattenuation along the falciform ligament representing focal  fatty infiltration. Normal  gallbladder, pancreas, and spleen. No  adrenal nodules. Symmetric enhancement of the kidneys. No  hydronephrosis or hydroureter. Bladder is moderately distended and  unremarkable. Hypermetabolic activity in the endometrial canal with  max SUV of 5.6 without focal abnormality on CT. Normal ovaries.     Normal caliber of the small and large bowel's. No free air or free  fluid in the abdomen or pelvis. Normal caliber of the infrarenal  aorta. Patent portal vein. No abdominal or pelvic lymphadenopathy.      LOWER EXTREMITIES:   No abnormal masses or hypermetabolic lesions.     BONES:   There are no suspicious lytic or blastic osseous lesions.  There is no  abnormal FDG uptake in the skeleton.                                                                      IMPRESSION:   1. Invasive ductal carcinoma of the upper inner quadrant of the right  breast. No hypermetabolic axillary lymph nodes to suggest metastatic  disease.  2. No evidence of metastatic disease in the neck, chest, abdomen, or  pelvis.  3. 3 mm pleural-based right upper lobe pulmonary nodule. Recommend  attention on follow-up.     I have personally reviewed the examination and initial interpretation  and I agree with the findings.     BRODY BARNHART MD    Reading Physician  Reading Date  Result Priority    Kam Hook MD  [phone]  2020        Narrative & Impression      [id number]  [id number]  [id number]  [id number]^MICHELET^TOI^JOHNNY           [facility]  Diagnostic and Treamtent-3rd Floor  [address] [place] [address]     Name: CORNELIO ARCOS  MRN: [id number]  : 1980  Study Date: 2020 09:10 AM  Age: 40 yrs  Gender: Female  Patient Location: [facility]  Reason For Study: Breast cancer (H), Malignant neoplasm of right female  breast, un  Ordering Physician: TOI TAFOYA  Referring Physician: TOI TAFOYA  Performed By: Felicia Grande RDCS     BSA: 1.5  m2  Height: 64 in  Weight: 114 lb  HR: 74  BP: 107/73 mmHg  _____________________________________________________________________________  __        Procedure  Echocardiogram with two-dimensional, color and spectral Doppler performed.  _____________________________________________________________________________  __        Interpretation Summary  Left ventricular function, chamber size, wall motion, and wall thickness are  normal.The EF is 60-65%.  Global peak LV longitudinal strain is averaged at -19.2%. This is within  reported normal limits (normal <-18%).  No pericardial effusion is present.  IVC diameter <2.1 cm collapsing >50% with sniff suggests a normal RA pressure  of 3 mmHg.  _____________________________________________________________________________  __        Left Ventricle  Left ventricular function, chamber size, wall motion, and wall thickness are  normal.The EF is 60-65%. LVEF 64% based on biplane 2D tracing. Global peak LV  longitudinal strain is averaged at -19.2%. This is within reported normal  limits (normal <-18%). Left ventricular diastolic function is normal.     Right Ventricle  Right ventricular function, chamber size, wall motion, and thickness are  normal.     Atria  Both atria appear normal.     Mitral Valve  The mitral valve is normal.        Aortic Valve  The valve leaflets are not well visualized. On Doppler interrogation, there is  no significant stenosis or regurgitation.     Tricuspid Valve  The tricuspid valve is normal. The peak velocity of the tricuspid regurgitant  jet is not obtainable.     Pulmonic Valve  On Doppler interrogation, there is no significant stenosis or regurgitation.     Vessels  The aorta root is normal. IVC diameter <2.1 cm collapsing >50% with sniff  suggests a normal RA pressure of 3 mmHg.     Pericardium  No pericardial effusion is present.        Compared to Previous Study  There is no prior study for direct  comparison.  _____________________________________________________________________________  __  MMode/2D Measurements & Calculations  IVSd: 0.70 cm     LVIDd: 4.1 cm  LVIDs: 2.5 cm  LVPWd: 0.81 cm  FS: 38.5 %  LV mass(C)d: 90.7 grams  LV mass(C)dI: 58.9 grams/m2  LA Volume (BP): 39.2 ml  LA Volume Index (BP): 25.5 ml/m2  RWT: 0.40        Doppler Measurements & Calculations  MV E max prashant: 98.0 cm/sec  MV A max prashant: 93.1 cm/sec  MV E/A: 1.1  MV dec slope: 818.4 cm/sec2  MV dec time: 0.12 sec     PA acc time: 0.18 sec  E/E' av.4  Lateral E/e': 5.8  Medial E/e': 8.9     _____________________________________________________________________________  __           Report approved by: Klaus Oliver 2020 09:55 AM         Impression/plan:     40 year old, premenopausal female w/ hx of tobacco and ETOH use, bilateral breast implants with T1bN0, multifocal, ER/TX +, HER-2 positive R upper/inner quadrant breast cancer  -reviewed the PET/CT scan and plan to initiate neoadjuvant treatment with TCHP. There is no sylvie or distant metastases noted.  - Treatment is given every 3 weeks. Discussed the rationale of neoadjuvant treatment to reduce risk of tumor spread and to downsize the tumor. Reviewed a plan to monitor her tumor size through treatment. Tumor is palpable. Will check CEA and Ca 27.29 today  -reviewed side effects including N/V, alopecia, neuropathy, cytopenias, risk of infection, bleeding, fatigue, anorexia, diarrhea, rash, cardiotoxicity, risk of allergic reaction  -reviewed use of antiemetics including zofran, compazine, ativan and use of dexamethasone 8 mg bid starting the evening after docetaxel and continuing for 4 additional doses  -discussed chemotherapy precautions, reviewed need to use contraception if sexually active. She notes she is not interested in sex at this time.  -after our conversation, she felt comfortable proceeding with treatment  -I will f/u with her prior to the next cycle to  assess her tolerance    Cardiac monitoring  -pretreatment echo on 7/13/20 shows EF of 60-65%, repeat echo in 3 months    ETOH/tobacco use  -congratulated her on stopping smoking. Discussed abstinence from ETOH during treatment    Mood/coping  -she is coming to terms with her diagnosis, is able to verbalize her fears and has started to think of strategies that might help her cope and set boundaries with friends and family  -will continue to offer support    RUL pleural based pulm nodule  -indeterminate, will need f/u imaging in a few months      Again, thank you for allowing me to participate in the care of your patient.        Sincerely,        ADAMA Morris CNP

## 2020-07-17 NOTE — NURSING NOTE
"Oncology Rooming Note    July 17, 2020 7:58 AM   Leah Rangel is a 40 year old female who presents for:    Chief Complaint   Patient presents with     RECHECK     Malignant neoplasm of upper-inner quadrant of right breast in female, estrogen receptor positive (H)Noted 7/16/2020     Initial Vitals: /87   Pulse 88   Temp 97.7  F (36.5  C) (Oral)   Resp 18   Ht 1.626 m (5' 4\")   Wt 46.7 kg (102 lb 13.6 oz)   SpO2 99%   BMI 17.65 kg/m   Estimated body mass index is 17.65 kg/m  as calculated from the following:    Height as of this encounter: 1.626 m (5' 4\").    Weight as of this encounter: 46.7 kg (102 lb 13.6 oz). Body surface area is 1.45 meters squared.  No Pain (0) Comment: Data Unavailable   No LMP recorded.  Allergies reviewed: Yes  Medications reviewed: Yes    Medications: Medication refills not needed today.  Pharmacy name entered into [facility]:    [id number] - [place] - [id number] AT [facility] OF  & [facility]  [facility] #[id number] - [place] - [address] AT [facility] OF [address]     Clinical concerns: SILVINA Ordonez CMA              "

## 2020-07-21 ENCOUNTER — VIRTUAL VISIT (OUTPATIENT)
Dept: ONCOLOGY | Facility: CLINIC | Age: 40
End: 2020-07-21
Attending: INTERNAL MEDICINE
Payer: COMMERCIAL

## 2020-07-21 ENCOUNTER — PRE VISIT (OUTPATIENT)
Dept: ONCOLOGY | Facility: CLINIC | Age: 40
End: 2020-07-21

## 2020-07-21 DIAGNOSIS — C50.211 MALIGNANT NEOPLASM OF UPPER-INNER QUADRANT OF RIGHT BREAST IN FEMALE, ESTROGEN RECEPTOR POSITIVE (H): Primary | ICD-10-CM

## 2020-07-21 DIAGNOSIS — Z17.0 MALIGNANT NEOPLASM OF UPPER-INNER QUADRANT OF RIGHT BREAST IN FEMALE, ESTROGEN RECEPTOR POSITIVE (H): Primary | ICD-10-CM

## 2020-07-21 DIAGNOSIS — C50.911 MALIGNANT NEOPLASM OF RIGHT FEMALE BREAST, UNSPECIFIED ESTROGEN RECEPTOR STATUS, UNSPECIFIED SITE OF BREAST (H): Primary | ICD-10-CM

## 2020-07-21 DIAGNOSIS — Z80.3 FAMILY HISTORY OF MALIGNANT NEOPLASM OF BREAST: ICD-10-CM

## 2020-07-21 PROCEDURE — 81408 MOPATH PROCEDURE LEVEL 9: CPT | Performed by: INTERNAL MEDICINE

## 2020-07-21 PROCEDURE — 81405 MOPATH PROCEDURE LEVEL 6: CPT | Performed by: INTERNAL MEDICINE

## 2020-07-21 PROCEDURE — 81479 UNLISTED MOLECULAR PATHOLOGY: CPT | Performed by: INTERNAL MEDICINE

## 2020-07-21 PROCEDURE — 81406 MOPATH PROCEDURE LEVEL 7: CPT | Performed by: INTERNAL MEDICINE

## 2020-07-21 PROCEDURE — 81307 PALB2 GENE FULL GENE SEQ: CPT | Performed by: INTERNAL MEDICINE

## 2020-07-21 PROCEDURE — 81321 PTEN GENE FULL SEQUENCE: CPT | Performed by: INTERNAL MEDICINE

## 2020-07-21 PROCEDURE — 81323 PTEN GENE DUP/DELET VARIANT: CPT | Performed by: INTERNAL MEDICINE

## 2020-07-21 PROCEDURE — 96040 ZZH GENETIC COUNSELING, EACH 30 MINUTES: CPT | Mod: GT,ZF | Performed by: GENETIC COUNSELOR, MS

## 2020-07-21 PROCEDURE — 81162 BRCA1&2 GEN FULL SEQ DUP/DEL: CPT | Performed by: INTERNAL MEDICINE

## 2020-07-21 NOTE — PROGRESS NOTES
Spoke with Leah to introduce myself and provide contact information.  Discussed a time that would work for her and her  to do a video chemo teach next week.    Answered all Leah's questions to her stated satisfaction.

## 2020-07-21 NOTE — LETTER
Cancer Risk Management  Program Locations    [facility]  [facility]  [facility]  Mailing Address  Cancer Risk Management Program  [facility]  [address]  [place] [address]    New patient appointments  [phone]  July 23, 2020    Leah Rangel  [address] [place] [address]      Dear Leah,    It was a pleasure speaking with you on the phone for genetic counseling on 7/21/2020. Here is a copy of the progress note from our discussion. If you have any additional questions, please feel free to call.    Referring Provider: Lucita Gerardo MD    Presenting Information:   I spoke with Leah Rangel over the phone today for genetic counseling to discuss her personal and family history of cancer. With her permission, this appointment was conducted over the phone due to COVID-19 precautions. We talked today to review this history, cancer screening recommendations, and available genetic testing options.    Personal History:  Leah is a 40 year old female. She was recently diagnosed with breast cancer at age 40 (right breast, IDC, ER positive, TN positive, HER2 positive). She began neoadjuvant chemotherapy last week.    She has already had some genetic testing ordered by Dr. Gerardo. The Breast Actionable Panel offered by the [facility] at [facility] is currently pending.    She had her first menstrual period at age 15, her first child at age 23, and is premenopausal. Leah has her ovaries, fallopian tubes and uterus in place. She reports that she has not used hormone replacement therapy. She states that she used Clomid briefly in the past. She also used oral contraceptives in the past. She had an IUD removed recently. Leah has not had a colonoscopy due to her age. Leah reported that she  quit smoking recently. She reported daily social alcohol use.    Family History: (Please see scanned pedigree for detailed family history information)  Maternal:  Her mother is 70 years old with no known history of cancer.   She reports no known diagnoses of cancer in her maternal relatives.  Paternal:  Her father passed away at age 51 due to a cancer of unknown primary origin.   Her paternal aunt is 70 years old and was diagnosed with breast cancer around age 45-50.   Another paternal aunt was diagnosed with throat cancer and passed away at age 66. She had a history of smoking.  Her paternal grandmother was diagnosed with breast cancer in approximately her 50s and passed away at age 93.    Her maternal ethnicity is Montserr[place]ian, English, Vincentian. Her paternal ethnicity is German, [place]ian. There is no known Ashkenazi Denominational ancestry on either side of her family.     Discussion:    Leah's personal and family history of cancer is suggestive of a hereditary cancer syndrome.    We reviewed the features of sporadic, familial, and hereditary cancers. In looking at Leah's family history, it is possible that a cancer susceptibility gene is present due to her recent diagnosis of breast cancer and her family history of breast cancer.    As described above, she has already had the Breast Actionable Panel ordered by Dr. Gerardo. Results are pending. We discussed this panel today.    We discussed the natural history and genetics of breast cancer. A detailed handout regarding the Breast Actionable Panel will be provided to Leah and can be found in the after visit summary. Topics included: inheritance pattern, cancer risks, cancer screening recommendations, and also risks, benefits and limitations of testing.    Based on her personal and family history, Leah meets current National Comprehensive Cancer Network (NCCN) criteria for genetic testing of high-penetrance breast and/or ovarian cancer susceptibility genes, which often  includes BRCA1, BRCA2, CDH1, PALB2, PTEN, and TP53 among others.     We discussed that there are additional genes that could cause increased risk for breast cancer. As many of these genes present with overlapping features in a family and accurate cancer risk cannot always be established based upon the pedigree analysis alone, it would be reasonable for Leah to consider panel genetic testing to analyze multiple genes at once.  We reviewed additional genetic testing options for breast and other cancers: the Breast Expanded Panel or expanded panel options to also include genes associated with other types of cancer. Leah expressed an interest in learning as much information as possible from the testing. Therefore, we discussed the comprehensive testing options of the Hereditary Cancer Comprehensive 40 gene panel or the Hereditary Cancer Comprehensive 84 gene panel. She opted to proceed with additional testing via the Hereditary Cancer Comprehensive 84 gene panel.  We discussed that many genes on this panel are associated with specific hereditary cancer syndromes and have published management guidelines. Other genes have medical management guidelines available to screen for certain cancers. The remaining genes are associated with increased cancer risk and may allow us to make medical recommendations when mutations are identified. Some of the genes on this expanded panel may have limited information available about specific cancer risks and therefore, there may be limited screening guidelines available. She stated that she understood potential limitations of a larger gene panel.     Due to COVID-19 precautions, this appointment was conducted entirely over the phone, therefore consent was obtained over the phone. This is indicated on the consent form, which also includes my signature (as the provider who obtained consent). Additional genetic testing via the Hereditary Cancer Comprehensive 84 gene panel will be  ordered from the [facility] at [facility]. Her blood is already at the lab for the Breast Actionable panel which is currently in process. Turnaround time: approximately 3-4 weeks.    Medical Management: For Leah, we reviewed that the information from genetic testing may determine:    surgery to treat Leah's active cancer diagnosis (i.e. lumpectomy versus bilateral mastectomy),    additional cancer screening for which Leah may qualify (i.e. mammogram and breast MRI, more frequent colonoscopies, more frequent dermatologic exams, etc.),    options for risk reducing surgeries Leah could consider (i.e. bilateral mastectomy, surgery to remove her ovaries and/or uterus, etc.),      and targeted chemotherapies for Leah's active cancer, or if she were to develop certain cancers in the future (i.e. immunotherapy for individuals with Montes syndrome, PARP inhibitors, etc.).     These recommendations will be discussed in detail once genetic testing is completed.     Plan:  1) Today Leah elected to proceed with additional genetic testing via the Hereditary Cancer Comprehensive 84 gene panel offered by the [facility] at [facility].  2) She will be contacted to review the results of the Breast Actionable Panel when available.  3) She will be contacted again to review the results of this additional testing as well.    Ayah Pratt MS, [facility]  Licensed Genetic Counselor  Office: [phone]                                                        BREAST ACTIONABLE PANEL    The Breast Actionable cancer panel is a genetic test which analyzes 11 genes known to be associated with an increased lifetime risk of breast and other cancers. Published medical guidelines exist for detection, prevention, risk reduction, and/or treatment for individuals with mutations in these genes.     Of note, the Breast Actionable cancer panel is not considered to be comprehensive.  Individuals with a family history of other cancers should be seen by a genetic counselor to discuss the family history and the possibility of expanded genetic testing.     GENES (11) ASSOCIATED CANCER(S) ASSOCIATED CANCER SYNDROME(S)   JOSS breast, pancreas    BRCA1 breast, ovary, pancreas, melanoma, prostate, male breast Hereditary breast /ovarian cancer syndrome (HBOC)   BRCA2 breast, ovary, pancreas, melanoma, prostate, male breast Hereditary breast /ovarian cancer syndrome (HBOC)   CDH1 breast, stomach, colon Hereditary diffuse gastric cancer   CHEK2 breast, colon, prostate    NBN breast, ovary, prostate    NF1 breast, brain, peripheral nervous system Neurofibromatosis 1   PALB2 breast, pancreas    PTEN breast, thyroid, uterus, colon, kidney Cowden syndrome, Teojmkgq-Lsvnx-Jfknpvlts syndrome (BRRS), PTEN-related Proteus syndrome (PS), Proteus-like syndrome   STK11 breast, ovary, colon, pancreas, stomach, uterus, cervix Peutz-Jeghers syndrome   TP53 breast, bone, brain, soft tissues, adrenal cortex Li-Fraumeni syndrome     Meeting with a Genetic Counselor  After you receive the results of the Breast Actionable Panel testing, providers will often refer you to see a genetic counselor. This is because patients can have a family history of cancer other than breast cancer and may benefit from a discussion about comprehensive, expanded genetic testing.     Additionally, you and the genetic counselor will discuss the impact of genetic testing on you and your family members, go over your family health history, and talk about potential screening and interventions.     Assessing Cancer Risk  Only about 5-10% of cancers are thought to be due to an inherited cancer susceptibility gene.  These families often have:    Several people with the same or related types of cancer    Cancers diagnosed at a young age (before age 50)    Individuals with more than one primary cancer    Multiple generations of the family affected with  cancer    Some people may be candidates for genetic testing of more than one gene.  For these families, genetic testing using a multi-gene cancer panel may be offered.  These panels may test genes known to increase the risk for breast (and other) cancers: JOSS, BRCA1, BRCA2, CDH1, CHEK2, PALB2, PTEN, and TP53.  The purpose of this handout is to serve as a brief summary of the high/moderate-risk breast cancer genes which have published clinical management guidelines for individuals who are found to carry a mutation.    BRCA1 and BRCA2-Hereditary Breast and Ovarian Cancer Syndrome (HBOC)  A single mutation in one of the copies of BRCA1 or BRCA2 increases the risk for breast and ovarian cancer, among others.  The risk for pancreatic cancer and melanoma may also be slightly increased in some families.  The tables below list the chance that someone with a BRCA mutation would develop cancer in his or her lifetime1,2,3,4.          Women   Men     General Population BRCA+    General Population BRCA+   Breast  12% 40-80%  Breast <1% ~7%   Ovarian  1-2% 10-40%  Prostate 16% 20%       A person s ethnic background is also important to consider, as individuals of Ashkenazi Gnosticist ancestry have a higher chance of having a BRCA gene mutation.  There are three BRCA mutations that occur more frequently in this population.     Individuals who inherit two BRCA2 mutations--one from their mother and one from their father--have a condition called Fanconi Anemia.  This rare autosomal recessive condition is associated with short stature, developmental delay, bone marrow failure, and increased risk for childhood cancers.    TP53-Li-Fraumeni Syndrome (LFS)  LFS is a cancer predisposition syndrome. Individuals with LFS are at an increased risk for developing cancer at a young age. The general lifetime risk for development of cancer is 50% by age 30 and 90% by age 60.  LFS is caused by a mutation in the TP53 gene.  A single mutation in one of  the copies of TP53 increases the risk for multiple cancers.     Core Cancers: Sarcomas, Breast, Brain, Lung, Leukemias/Lymphomas, Adrenocortical carcinomas  Other Cancers: Gastrointestinal, Thyroid, Skin, Genitourinary    PTEN-Cowden Syndrome  Cowden syndrome is a hereditary condition that increases the risk for breast, thyroid, endometrial, and kidney cancer.  Cowden syndrome is caused by a mutation in the PTEN gene.  A single mutation in one of the copies of PTEN causes Cowden syndrome and increases cancer risk.  The table below shows the chance that someone with a PTEN mutation would develop cancer in their lifetime5,6.  Other benign features seen in some individuals with Cowden syndrome include benign skin lesions (facial papules, keratoses, lipomas), learning disability, autism, thyroid nodules, colon polyps, and larger head size.      Lifetime Cancer Risk   Cancer Type General Population Cowden Syndrome   Breast  12% 25-50%*   Thyroid  1% 35%   Renal 1-2% 35%   Endometrial  2-3% 28%   Colon 5% 9%   Melanoma 2-3% >5%     *One recent study found breast cancer risk to be increased to 85%    CDH1-Hereditary Diffuse Gastric Cancer (HDGC)  Currently, one gene is known to cause hereditary diffuse gastric cancer: CDH1.  Individuals with HDGC are at increased risk for diffuse gastric cancer and lobular breast cancer. Of people diagnosed with HDGC, 30-50% have a mutation in the CDH1 gene.  This suggests there are likely other genes that may cause HDGC that have not been identified yet.      Lifetime Cancer Risks    General Pop HDGC    Diffuse Gastric  <1% ~80%   Breast 12% 39-52%       Additional Genes Associated with Increased Breast Cancer Risk  PALB2  Mutations in PALB2 have been shown to increase the risk of breast cancer up to 33-58% in some families; where individuals fall within this risk range is dependent upon family history.9 PALB2 mutations have also been associated with increased risk for pancreatic cancer,  although this risk has not been quantified yet.  Individuals who inherit two PALB2 mutations--one from their mother and one from their father--have a condition called Fanconi Anemia.  This rare autosomal recessive condition is associated with short stature, developmental delay, bone marrow failure, and increased risk for childhood cancers.    JOSS  JOSS is a moderate-risk breast cancer gene. Women who have a mutation in JOSS can have between a 2-4 fold increased risk for breast cancer compared to the general population.10 OJSS mutations have also been associated with increased risk for pancreatic cancer, however an estimate of this cancer risk is not well understood.11  Individuals who inherit two JOSS mutations have a condition called ataxia-telangiectasia (AT).  This rare autosomal recessive condition affects the nervous system and immune system, and is associated with progressive cerebellar ataxia beginning in childhood.  Individuals with ataxia-telangiectasia often have a weakened immune system and have an increased risk for childhood cancers.         CHEK2   CHEK2 is a moderate-risk breast cancer gene.  Women who have a mutation in CHEK2 have around a 2-fold increased risk for breast cancer compared to the general population, and this risk may be higher depending upon family history.12,13,14 Mutations in CHEK2 have also been shown to increase the risk of a number of other cancers, including colon and prostate, however these cancer risks are currently not well understood.      Inheritance   All of the genes reviewed above are inherited in an autosomal dominant pattern. This means that if a parent has a mutation, each of his or her children will have a 50% chance of inheriting that same mutation.  Therefore, each child--male or female--would have a 50% chance of being at increased risk for developing cancer.        Image obtained from [employer], 2013     In rare cases, there can be mutations in both  copies of these genes (one from each parent), leading to different autosomal recessive conditions.  Mutations in some genes can occur de jose armando, which means that a person s mutation occurred for the first time in them and was not inherited from a parent.  Now that they have the mutation, however, it can be passed on to future generations.     Genetic Testing  Genetic testing involves a blood test and will look for any harmful mutations within genes that are associated with increased cancer risk.  If possible, it is recommended that the person(s) who has had cancer be tested before other family members.  That person will give us the most useful information about whether or not a specific gene is associated with the cancer in the family.    Results  There are three possible results of genetic testing:    Positive--a harmful mutation was identified in one or more of the genes    Negative--no mutation was identified in any of the genes on this panel    Variant of unknown significance--a variation in one of the genes was identified, but it is unclear how this impacts cancer risk in the family    Advantages and Disadvantages  There are advantages and disadvantages to genetic testing.  Advantages    May clarify your cancer risk    Can help you make medical decisions    May explain the cancers in your family    May give useful information to your family members (if you share your results)    Disadvantages    Possible negative emotional impact of learning about inherited cancer risk    Uncertainty in interpreting a negative test result in some situations    Possible genetic discrimination concerns (see below)    Genetic Information Nondiscrimination Act (ANNALISE)  ANNALISE is a federal law that protects individuals from health insurance or employment discrimination based on a genetic test result alone.  Although rare, there are currently no legal discrimination protections in terms of life insurance, long term care, or disability  insurances. Visit the National Human AuthorityLabs Research [place] genome.gov/14493124 to learn more.    Reducing Cancer Risk  Each of the genes listed within this handout have nationally recognized cancer screening guidelines that would be recommended for individuals who test positive.  In addition to increased cancer screening, surgeries may be offered or recommended to reduce cancer risk.  Recommendations are based upon an individual s genetic test result as well as their personal and family history of cancer.    Questions to Think About Regarding Genetic Testing    What effect will the test result have on me and my relationship with my family members if I have an inherited gene mutation?  If I don t have a gene mutation?    Should I share my test results, and how will my family react to this news, which may also affect them?    Are my children ready to learn new information that may one day affect their own health?    Resources  FORCE: Facing Our Risk of Cancer Empowered facingourrisk.org   Bright Pink bebrightpink.org   Li-Fraumeni Syndrome Association lfsassociation.org   PTEN World PTENworld.ARTENCY.COM   No stomach for cancer, Inc. nostomachforcancer.org   Stomach cancer relief network scrnet.org   Collaborative Group of the Americas on Inherited Colorectal Cancer (CGA) cgaicc.com    Cancer Care cancercare.org   American Cancer Society (ACS) cancer.org   National Cancer [place] (NCI) cancer.gov     Cancer Risk Management Program [phone] ([phone])  ? Arron Gr, MS, [facility]  [phone]  ? Janae Garvey, MS, [facility]  [phone]  ? Trina Isabel, MS, [facility]  [phone]  ? Lyle Acosta, MS, [facility]  [phone]  ? Ayah Pratt, MS, [facility]  [phone]  ? Gloria Huston, MS, [facility]  [phone]  ? Bertha Greene, MS, [facility]  [phone]    References  1. Sergo EsparzaP, Bri S, Lela BRANDON, Javon JE, Joel JL, Ladi N, Sony H, Jossy O, Ivana A, Eveline B, Heather P, Marty S, Tomas MARRUFO, Romero N, Vee POPE,  Jazmin H, Sam E, Marie J, Kd J, Grady B, Tulinius H, Thorlacius S, Eerola H, Francisco H, David K, Shelia OP. Average risks of breast and ovarian cancer associated with BRCA1 or BRCA2 mutations detected in case series unselected for family history: a combined analysis of 222 studies. Am J Hum Onelia. 2003;72:1117-30.  2. Gerry N, Agata M, Joaquín G.  BRCA1 and BRCA2 Hereditary Breast and Ovarian Cancer. Gene Reviews online. 2013.  3. Zak YC, Svetlana S, Katie G, Kilpatrick S. Breast cancer risk among male BRCA1 and BRCA2 mutation carriers. J Natl Cancer Inst. 2007;99:1811-4.  4. Kash DORMAN, Jennifer I, Jaswant J, Danny E, Austin ER, Slick F. Risk of breast cancer in male BRCA2 carriers. J Med Onelia. 2010;47:710-1.  5. Nomi MH, Amina J, Joanna J, Jessica CORREIA, Ju SALGADO, Eng C. Lifetime cancer risks in individuals with germline PTEN mutations. Clin Cancer Res. 2012;18:400-7.  6. Pilarski R. Cowden Syndrome: A Critical Review of the Clinical Literature. J Onelia . 2009:18:13-27.  7. National Comprehensive Cancer Network. Clinical practice guidelines in oncology, colorectal cancer screening. Available online (registration required). 2013.  8. National Cancer [place]. SEER Cancer Stat Fact Sheets.  December 2013.  9. Vasyl WALLS, et al. Breast-Cancer Risk in Families with Mutations in PALB2. NEJM. 2014; 371(6):497-506.  10. Danika SINGER, Henrique D, Tavo S, Michelle P, Chagtai T, David M, Toño B, Robin H, Veronica R, Emmanuel K, Venkata L, Kash DG, Tomas D, Magan DF, Danielito MR, The Breast Cancer Susceptibility Collaboration (UK) & Coleen TRAORE. JOSS mutations that cause ataxia-telangiectasia are breast cancer susceptibility alleles. Nature Genetics. 2006;38:873-875  11. Santiago N , Herbert Y, Lacie URENA, Jason DRIVER, Alcides GM , Mary ML, Perez S, Hernandez AG, Rome S, Luiza ML, Aletha J , Monserrat R, Arnulfo SZ, Tarsha JR, Jaja VE, Jennifer M, Cheryl B, Soraya N,  Luis RH, Cielo KW, and Maggy AP. JOSS mutations in patients with hereditary pancreatic cancer. Cancer Discover. 2012;2:41-46  12. CHEK2 Breast Cancer Case-Control Consortium. CHEK2*1100delC and susceptibility to breast cancer: A collaborative analysis involving 10,860 breast cancer cases and 9,065 controls from 10 studies. Am J Hum Onelia, 74 (2004), pp. 2772-1165  13. Randy T, Isaiah S, Heriberto K, et al. Spectrum of Mutations in BRCA1, BRCA2, CHEK2, and TP53 in Families at High Risk of Breast Cancer. LONG, 2006;295(12):5967-7252.   14. Taisha C, Mary D, Saad A, et al. Risk of breast cancer in women with a CHEK2 mutation with and without a family history of breast cancer. JClin Oncol. 2011;29:4584-8649.      TURNAROUND TIME  4 - 6 weeks    INSURANCE COVERAGE   A prior authorization will be submitted when your provider submits the order for this panel. Testing will be held until prior authorization is obtained. You will be contacted once prior authorization is completed. For details regarding coverage and out-of-pocket estimates, please contact a  at the [facility] ([facility]) at [phone].    About the [facility] ([facility]), [facility]  In collaborative effort with the [facility] and the [place] [employer], the [facility] offers a full range of diagnostic testing services, with a strong focus on quality, accuracy, and timeliness.

## 2020-07-21 NOTE — PROGRESS NOTES
"7/21/2020    Leah Rangel is a 40 year old female who is being evaluated via a billable video visit.      The patient has been notified of following:     \"This video visit will be conducted via a call between you and your physician/provider. We have found that certain health care needs can be provided without the need for an in-person physical exam.  This service lets us provide the care you need with a video conversation.  If a prescription is necessary we can send it directly to your pharmacy.  If lab work is needed we can place an order for that and you can then stop by our lab to have the test done at a later time.    Video visits are billed at different rates depending on your insurance coverage.  Please reach out to your insurance provider with any questions.    If during the course of the call the physician/provider feels a video visit is not appropriate, you will not be charged for this service.\"    Patient has given verbal consent for Video visit? Yes    Video-Visit Details    Type of service:  Video Visit / Telephone    Video converted to telephone visit due to connection issues with video.    Phone call duration: 43 minutes    Originating Location (pt. Location): Home    Distant Location (provider location):  [facility]    Platform used for Video Visit: [employer] / Telephone    Referring Provider: Lucita Gerardo MD    Presenting Information:   I spoke with Leah Rangel over the phone today for genetic counseling to discuss her personal and family history of cancer. With her permission, this appointment was conducted over the phone due to COVID-19 precautions. We talked today to review this history, cancer screening recommendations, and available genetic testing options.    Personal History:  Leah is a 40 year old female. She was recently diagnosed with breast cancer at age 40 (right breast, IDC, ER positive, NE positive, HER2 positive). She began neoadjuvant chemotherapy " last week.    She has already had some genetic testing ordered by Dr. Gerardo. The Breast Actionable Panel offered by the [facility] at [facility] is currently pending.    She had her first menstrual period at age 15, her first child at age 23, and is premenopausal. Leah has her ovaries, fallopian tubes and uterus in place. She reports that she has not used hormone replacement therapy. She states that she used Clomid briefly in the past. She also used oral contraceptives in the past. She had an IUD removed recently. Leah has not had a colonoscopy due to her age. Leah reported that she quit smoking recently. She reported daily social alcohol use.    Family History: (Please see scanned pedigree for detailed family history information)  Maternal:  Her mother is 70 years old with no known history of cancer.   She reports no known diagnoses of cancer in her maternal relatives.  Paternal:  Her father passed away at age 51 due to a cancer of unknown primary origin.   Her paternal aunt is 70 years old and was diagnosed with breast cancer around age 45-50.   Another paternal aunt was diagnosed with throat cancer and passed away at age 66. She had a history of smoking.  Her paternal grandmother was diagnosed with breast cancer in approximately her 50s and passed away at age 93.    Her maternal ethnicity is Indonesian, English, [place]ian. Her paternal ethnicity is [place], Indonesian. There is no known Ashkenazi Mu-ism ancestry on either side of her family.     Discussion:    Leah's personal and family history of cancer is suggestive of a hereditary cancer syndrome.    We reviewed the features of sporadic, familial, and hereditary cancers. In looking at Leah's family history, it is possible that a cancer susceptibility gene is present due to her recent diagnosis of breast cancer and her family history of breast cancer.    As described above, she has already had the Breast Actionable Panel ordered  by Dr. Gerardo. Results are pending. We discussed this panel today.    We discussed the natural history and genetics of breast cancer. A detailed handout regarding the Breast Actionable Panel will be provided to Leah and can be found in the after visit summary. Topics included: inheritance pattern, cancer risks, cancer screening recommendations, and also risks, benefits and limitations of testing.    Based on her personal and family history, Leah meets current National Comprehensive Cancer Network (NCCN) criteria for genetic testing of high-penetrance breast and/or ovarian cancer susceptibility genes, which often includes BRCA1, BRCA2, CDH1, PALB2, PTEN, and TP53 among others.     We discussed that there are additional genes that could cause increased risk for breast cancer. As many of these genes present with overlapping features in a family and accurate cancer risk cannot always be established based upon the pedigree analysis alone, it would be reasonable for Leah to consider panel genetic testing to analyze multiple genes at once.  We reviewed additional genetic testing options for breast and other cancers: the Breast Expanded Panel or expanded panel options to also include genes associated with other types of cancer. Leah expressed an interest in learning as much information as possible from the testing. Therefore, we discussed the comprehensive testing options of the Hereditary Cancer Comprehensive 40 gene panel or the Hereditary Cancer Comprehensive 84 gene panel. She opted to proceed with additional testing via the Hereditary Cancer Comprehensive 84 gene panel.  We discussed that many genes on this panel are associated with specific hereditary cancer syndromes and have published management guidelines. Other genes have medical management guidelines available to screen for certain cancers. The remaining genes are associated with increased cancer risk and may allow us to make medical recommendations  when mutations are identified. Some of the genes on this expanded panel may have limited information available about specific cancer risks and therefore, there may be limited screening guidelines available. She stated that she understood potential limitations of a larger gene panel.     Due to COVID-19 precautions, this appointment was conducted entirely over the phone, therefore consent was obtained over the phone. This is indicated on the consent form, which also includes my signature (as the provider who obtained consent). Additional genetic testing via the Hereditary Cancer Comprehensive 84 gene panel will be ordered from the [facility] at [facility]. Her blood is already at the lab for the Breast Actionable panel which is currently in process. Turnaround time: approximately 3-4 weeks.    Medical Management: For Leah, we reviewed that the information from genetic testing may determine:    surgery to treat Leah's active cancer diagnosis (i.e. lumpectomy versus bilateral mastectomy),    additional cancer screening for which Leah may qualify (i.e. mammogram and breast MRI, more frequent colonoscopies, more frequent dermatologic exams, etc.),    options for risk reducing surgeries Leah could consider (i.e. bilateral mastectomy, surgery to remove her ovaries and/or uterus, etc.),      and targeted chemotherapies for Leah's active cancer, or if she were to develop certain cancers in the future (i.e. immunotherapy for individuals with Montes syndrome, PARP inhibitors, etc.).     These recommendations will be discussed in detail once genetic testing is completed.     Plan:  1) Today Leah elected to proceed with additional genetic testing via the Hereditary Cancer Comprehensive 84 gene panel offered by the [facility] at [facility].  2) She will be contacted to review the results of the Breast Actionable Panel when available.  3) She will be  contacted again to review the results of this additional testing as well.    Time spent over the phone: 43 minutes    Ayah Pratt MS, [facility]  Licensed Genetic Counselor  Office: [phone]

## 2020-07-23 ENCOUNTER — VIRTUAL VISIT (OUTPATIENT)
Dept: ONCOLOGY | Facility: CLINIC | Age: 40
End: 2020-07-23
Attending: HOSPITALIST
Payer: COMMERCIAL

## 2020-07-23 ENCOUNTER — TELEPHONE (OUTPATIENT)
Dept: ONCOLOGY | Facility: CLINIC | Age: 40
End: 2020-07-23

## 2020-07-23 VITALS — WEIGHT: 98 LBS | BODY MASS INDEX: 16.82 KG/M2

## 2020-07-23 DIAGNOSIS — K12.31 MUCOSITIS DUE TO CHEMOTHERAPY: ICD-10-CM

## 2020-07-23 DIAGNOSIS — R53.0 NEOPLASTIC (MALIGNANT) RELATED FATIGUE: ICD-10-CM

## 2020-07-23 DIAGNOSIS — G89.3 CANCER ASSOCIATED PAIN: Primary | ICD-10-CM

## 2020-07-23 LAB — COPATH REPORT: NORMAL

## 2020-07-23 PROCEDURE — 99204 OFFICE O/P NEW MOD 45 MIN: CPT | Mod: 95 | Performed by: HOSPITALIST

## 2020-07-23 PROCEDURE — 40001009 ZZH VIDEO/TELEPHONE VISIT; NO CHARGE

## 2020-07-23 RX ORDER — MORPHINE SULFATE 15 MG/1
7.5-15 TABLET ORAL EVERY 4 HOURS PRN
Qty: 45 TABLET | Refills: 0 | Status: SHIPPED | OUTPATIENT
Start: 2020-07-23 | End: 2021-08-09

## 2020-07-23 RX ORDER — METHYLPHENIDATE HYDROCHLORIDE 5 MG/1
2.5-5 TABLET ORAL 2 TIMES DAILY PRN
Qty: 60 TABLET | Refills: 0 | Status: SHIPPED | OUTPATIENT
Start: 2020-07-23 | End: 2021-08-09

## 2020-07-23 RX ORDER — DIPHENHYDRAMINE HYDROCHLORIDE AND LIDOCAINE HYDROCHLORIDE AND ALUMINUM HYDROXIDE AND MAGNESIUM HYDRO
5-10 KIT EVERY 6 HOURS PRN
Qty: 237 ML | Refills: 1 | Status: SHIPPED | OUTPATIENT
Start: 2020-07-23 | End: 2022-11-28

## 2020-07-23 RX ORDER — LIDOCAINE HYDROCHLORIDE 20 MG/ML
JELLY TOPICAL 3 TIMES DAILY PRN
Qty: 30 ML | Refills: 1 | Status: SHIPPED | OUTPATIENT
Start: 2020-07-23

## 2020-07-23 RX ORDER — GLYCERIN/MIN OIL/POLYCARBOPHIL
GEL WITH APPLICATOR (GRAM) VAGINAL
Qty: 35 G | Refills: 1 | Status: SHIPPED | OUTPATIENT
Start: 2020-07-23

## 2020-07-23 ASSESSMENT — PAIN SCALES - GENERAL: PAINLEVEL: MILD PAIN (2)

## 2020-07-23 NOTE — PROGRESS NOTES
Palliative Care Outpatient Clinic Consultation Note    Patient:  Leah Rangel    Chief Complaint:   Leah Rangel is a 40 year old female who is being evaluated via a billable video visit today at the request of Dr Gerardo for a palliative care consultation secondary to breast cancer.      The patient's primary care provider is:  Ge Wiggins.     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 Discussed our potential roles for symptom management, support/coping, and decisional support (aka goals of care).    Leah feels very poorly and is globally struggling with her chemotherapy.  First day was fine, worsening since. She is dealing with fatigue, anorexia, various pains, weight loss, and states that within all of this she is still processing the diagnosis itself.     Fatigue: onset after chemotherapy. At this point she is barely able to tolerate taking a shower. The last two days she spent laying in bed. She didn't tolerate dexamethasone which she received after her chemotherapy, felt jittery and was looking forward to complete the course.     Pain: She has three sources of pain. In decreasing severity these are vulvovaginal pain, oral mucositis, pain in the R breast.   She describes constant burning pain affecting her vulvovaginal area. She also has a sensation of vaginal tightness/compression. Applying cold compresses has been helpful to some extent. She hasn't applied any topical agents.   No discharge, no sores/ulcers. Presentation not consistent with her previous experiences of candidiasis.    She has oral mucositis with diffuse burning/tenderness involving her tongue, palate, buccal mucosa, gums, and less so the pharynx. No sores/ulcers. This has affected her  ability to eat. She has been doing soda and salt water rinses with some relief.      The pain in her R breast has been present since diagnosis and is currently tolerable during the day. It does affect sleep at night. She has been taking tylenol PM in addition to 1mg lorazepam at bedtime and has been able to sleep.     Anorexia, weight loss: Her appetite has been poor. In addition due to her mouth sores the has discomfort with most foods. Her taste sensation seems to have started returning in the last day. This is worsened by fear of diarrhea, which seems to get triggered with eating. We discuss this to some extent today. I share that she should see improvement in the coming days as the chemotherapy effect wanes. And I encourage her to eat by a schedule and focus on foods that are easy to swallow, ice cream, shakes, etc.    Nausea has not been an issue.     Since she wants to be present with her family, she has been avoiding taking medications during the day. She is open to trials of medications for symptoms in the hope that with better symptom control she will be able to improve her wellbeing for herself and her family.    Patient's Disease Understanding: appropriate, not discussed in detail today    Coping:  She feels overwhelmed with several aspects of this.   Leah is still processing that this is happening at all.   She is taken aback by how severe her reaction to chemotherapy has been and how poorly she has been tolerating this.   And she is worried about how this affects her family, especially her children.     Functional Status:  2 (Ambulatory and capable of all selfcare but unable to carry out any work activities; may need help with IADLs up and about > 50% of waking hours)    Social History  Living Situation: with  and children    Children: age 16 and 8    Substance Use/History of misuse: denies    Social History     Tobacco Use     Smoking status: Former Smoker     Start date: 7/2/2020      Smokeless tobacco: Never Used   Substance Use Topics     Alcohol use: Yes     Drinks per session: 1 or 2     Comment: very rare social drink     Drug use: Never     Advance Care Planning:  Visit was focused on symptoms. Will address at future visit    Recommendations & Counseliny/o with recently diagnosed breast cancer on chemotherapy, which she hasn't been tolerating well.     Fatigue:   - ritalin 2.5-5mg bid prn    Pain:  - MSIR 7.5-15mg q4h prn   - encouraged Leah to trial taking this during the day with the aim to increase her presence with family via improved control of the vaginal pain  - MMW prn  - lidocaine jelly for vulvovaginal pain  - recommended vaginal moisturizer   - refer to gynecology for evaluation (r/o dysplasia, dermatitis)    Mucositis:   - MMW qid prn    Coping:  - appointment with [facility]     Return to clinic in 2-4 weeks.    Data and Chart Review:    REVIEW OF SYSTEMS:   ROS: 10 point ROS neg other than the symptoms noted above in the HPI and here.      Physical Exam:  Vital Signs not checked, virtual visit    CONSTIT: awake, appears uncomfortable  EENT: MMM, EOMI, no icterus  RESP: reg, nl effort, no cough  MSK: moves x4, DA  SKIN: no rash, no obvious lesions  NEURO: alert, oriented x3  PSYCH: appropriate affect, memory and thought process intact. Tearful at times    The rest of a comprehensive physical examination is deferred  due to public health emergency video visit restrictions.    Medications, current, pertinent:    Dexamethasone after chemo    Lorazepam prn    : ok    Allergies   Allergen Reactions     No Known Drug Allergies      Past Medical History:   Diagnosis Date     Breast cancer (H)      Complication of anesthesia      PONV (postoperative nausea and vomiting)      Past Surgical History:   Procedure Laterality Date     INSERT PORT VASCULAR ACCESS Left 7/15/2020    Procedure: Port Placement;  Surgeon: Julio Banks MD;  Location:  OR       CHEST PORT PLACEMENT > 5 YRS OF AGE  7/15/2020       Family History  No family history on file.      Lab and imaging data Reviewed:  Comments:           Opioid Risk Tool:   Opioid Risk Tool (ORT):    Family History of Substance Abuse:        Alcohol = 0 pt (no)       Illegal Drugs = 0 pt (no)       Prescription Drugs = 0 pt (no)      Personal History of Substance Abuse:       Alcohol = 0 pt (no)       Illegal Drugs = 0 pt (no)       Prescription Drugs = 0 pt (no)        Age: 0 pt (age < 16; age > 45)      Psychological Disease: 0 pt (none)      ORT Score = 0        0-3: Low risk for opioid abuse       4-7: Moderate risk for opioid abuse       >/= 8: High risk for opioid abuse    [facility] Outpatient Palliative Care Opioid Prescribing Safety Plan    Opioid Safety Education: Reviewed by Deborah Hook MD on 7/23/2020   Opioid Risk Assessment: Performed by Deborah Hook MD on 7/22/2020.  ORT score of 0.   Mood Disorder Assessment: Performed by Deborah Hook MD on 7/22/2020.     reviewed with every prescription, last reviewed by Deborah Hook MD on 7/23/2020     Additional recommendations based on patient's prognosis and indication for opioids include the following:      Expected prognosis >1 year  Risk: Low (ORT<4)  Indication for Opioid Use: Moderate or Strong  Baseline UDT performed on: not performed, virtual visit  Naloxone Script provided if patient also on benzodiazepine: not yet, minimal use expected, risk discussed  Indications for Tapering reviewed: yes 7/23/2020       Video-Visit Details    Type of service:  Video Visit    Physician has received verbal consent for a Video Visit from the patient? Yes    Video Start Time: 0924    Video End Time (time video stopped): 1009    Originating Location (pt. Location): Home    Distant Location (provider location):  [facility]     Mode of Communication:  Video Conference via [facility]      Deborah Hook MD  Palliative Medicine  Pager  [phone]

## 2020-07-23 NOTE — PATIENT INSTRUCTIONS
Patient Instructions      Expand All Collapse All    Thank you for coming into the Palliative Care Clinic today.     1. Pain:   - take morphing 7.5-15mg (1/2 to full tablet) up to every 4 hours as needed  - please wait at least two hours between this and lorazepam to avoid oversedation   - I gave you a referral to gynecology  - apply vaginal moisturizer  - use lidocaine gel in the vulvovaginal area up to 3 times a day as needed    2. Mouth Pain/mucositis  - use the magic mouthwash up to four times a day as needed    3. Fatigue:   - take ritalin 2.5-5mg (1/2 to full tablet) up to twice a day  - take no later than about 2pm to avoid interference with sleep at night    4. Processing, coping  - you will get a call to schedule an appointment with [facility]       Call if your symptoms change and the medications we have prescribed are not working. Do not take your medications at any other dose or frequently than how they are prescribed.     Call us at least a week in advance if you need a medication refill    Please bring all your pill bottles to your next appointment.     Failure to follow these instructions may result in the palliative care team not being able to prescribe your medications.    Return to clinic in 2-4 weeks for a follow up.     You can reach the Palliative Care Team during business hours at the following number:    - [phone]    To reach the Palliative Care Provider on-call After-hours or on holidays and weekends, call: [phone].  Please note that we are not able to provide pain medication refills on evenings or weekends.

## 2020-07-23 NOTE — PATIENT INSTRUCTIONS
BREAST ACTIONABLE PANEL    The Breast Actionable cancer panel is a genetic test which analyzes 11 genes known to be associated with an increased lifetime risk of breast and other cancers. Published medical guidelines exist for detection, prevention, risk reduction, and/or treatment for individuals with mutations in these genes.     Of note, the Breast Actionable cancer panel is not considered to be comprehensive. Individuals with a family history of other cancers should be seen by a genetic counselor to discuss the family history and the possibility of expanded genetic testing.     GENES (11) ASSOCIATED CANCER(S) ASSOCIATED CANCER SYNDROME(S)   JOSS breast, pancreas    BRCA1 breast, ovary, pancreas, melanoma, prostate, male breast Hereditary breast /ovarian cancer syndrome (HBOC)   BRCA2 breast, ovary, pancreas, melanoma, prostate, male breast Hereditary breast /ovarian cancer syndrome (HBOC)   CDH1 breast, stomach, colon Hereditary diffuse gastric cancer   CHEK2 breast, colon, prostate    NBN breast, ovary, prostate    NF1 breast, brain, peripheral nervous system Neurofibromatosis 1   PALB2 breast, pancreas    PTEN breast, thyroid, uterus, colon, kidney Cowden syndrome, Xpqlxthd-Cekvq-Niaviimec syndrome (BRRS), PTEN-related Proteus syndrome (PS), Proteus-like syndrome   STK11 breast, ovary, colon, pancreas, stomach, uterus, cervix Peutz-Jeghers syndrome   TP53 breast, bone, brain, soft tissues, adrenal cortex Li-Fraumeni syndrome     Meeting with a Genetic Counselor  After you receive the results of the Breast Actionable Panel testing, providers will often refer you to see a genetic counselor. This is because patients can have a family history of cancer other than breast cancer and may benefit from a discussion about comprehensive, expanded genetic testing.     Additionally, you and the genetic counselor will discuss the impact of genetic testing on you and your family members, go over your family health  history, and talk about potential screening and interventions.     Assessing Cancer Risk  Only about 5-10% of cancers are thought to be due to an inherited cancer susceptibility gene.  These families often have:    Several people with the same or related types of cancer    Cancers diagnosed at a young age (before age 50)    Individuals with more than one primary cancer    Multiple generations of the family affected with cancer    Some people may be candidates for genetic testing of more than one gene.  For these families, genetic testing using a multi-gene cancer panel may be offered.  These panels may test genes known to increase the risk for breast (and other) cancers: JOSS, BRCA1, BRCA2, CDH1, CHEK2, PALB2, PTEN, and TP53.  The purpose of this handout is to serve as a brief summary of the high/moderate-risk breast cancer genes which have published clinical management guidelines for individuals who are found to carry a mutation.    BRCA1 and BRCA2-Hereditary Breast and Ovarian Cancer Syndrome (HBOC)  A single mutation in one of the copies of BRCA1 or BRCA2 increases the risk for breast and ovarian cancer, among others.  The risk for pancreatic cancer and melanoma may also be slightly increased in some families.  The tables below list the chance that someone with a BRCA mutation would develop cancer in his or her lifetime1,2,3,4.          Women   Men     General Population BRCA+    General Population BRCA+   Breast  12% 40-80%  Breast <1% ~7%   Ovarian  1-2% 10-40%  Prostate 16% 20%       A person s ethnic background is also important to consider, as individuals of Ashkenazi Baptism ancestry have a higher chance of having a BRCA gene mutation.  There are three BRCA mutations that occur more frequently in this population.     Individuals who inherit two BRCA2 mutations--one from their mother and one from their father--have a condition called Fanconi Anemia.  This rare autosomal recessive condition is associated with  short stature, developmental delay, bone marrow failure, and increased risk for childhood cancers.    TP53-Li-Fraumeni Syndrome (LFS)  LFS is a cancer predisposition syndrome. Individuals with LFS are at an increased risk for developing cancer at a young age. The general lifetime risk for development of cancer is 50% by age 30 and 90% by age 60.  LFS is caused by a mutation in the TP53 gene.  A single mutation in one of the copies of TP53 increases the risk for multiple cancers.     Core Cancers: Sarcomas, Breast, Brain, Lung, Leukemias/Lymphomas, Adrenocortical carcinomas  Other Cancers: Gastrointestinal, Thyroid, Skin, Genitourinary    PTEN-Cowden Syndrome  Cowden syndrome is a hereditary condition that increases the risk for breast, thyroid, endometrial, and kidney cancer.  Cowden syndrome is caused by a mutation in the PTEN gene.  A single mutation in one of the copies of PTEN causes Cowden syndrome and increases cancer risk.  The table below shows the chance that someone with a PTEN mutation would develop cancer in their lifetime5,6.  Other benign features seen in some individuals with Cowden syndrome include benign skin lesions (facial papules, keratoses, lipomas), learning disability, autism, thyroid nodules, colon polyps, and larger head size.      Lifetime Cancer Risk   Cancer Type General Population Cowden Syndrome   Breast  12% 25-50%*   Thyroid  1% 35%   Renal 1-2% 35%   Endometrial  2-3% 28%   Colon 5% 9%   Melanoma 2-3% >5%     *One recent study found breast cancer risk to be increased to 85%    CDH1-Hereditary Diffuse Gastric Cancer (HDGC)  Currently, one gene is known to cause hereditary diffuse gastric cancer: CDH1.  Individuals with HDGC are at increased risk for diffuse gastric cancer and lobular breast cancer. Of people diagnosed with HDGC, 30-50% have a mutation in the CDH1 gene.  This suggests there are likely other genes that may cause HDGC that have not been identified yet.      Lifetime  Cancer Risks    General Pop [facility]    Diffuse Gastric  <1% ~80%   Breast 12% 39-52%       Additional Genes Associated with Increased Breast Cancer Risk  PALB2  Mutations in PALB2 have been shown to increase the risk of breast cancer up to 33-58% in some families; where individuals fall within this risk range is dependent upon family history.9 PALB2 mutations have also been associated with increased risk for pancreatic cancer, although this risk has not been quantified yet.  Individuals who inherit two PALB2 mutations--one from their mother and one from their father--have a condition called Fanconi Anemia.  This rare autosomal recessive condition is associated with short stature, developmental delay, bone marrow failure, and increased risk for childhood cancers.    JOSS  JOSS is a moderate-risk breast cancer gene. Women who have a mutation in JOSS can have between a 2-4 fold increased risk for breast cancer compared to the general population.10 JOSS mutations have also been associated with increased risk for pancreatic cancer, however an estimate of this cancer risk is not well understood.11  Individuals who inherit two JOSS mutations have a condition called ataxia-telangiectasia (AT).  This rare autosomal recessive condition affects the nervous system and immune system, and is associated with progressive cerebellar ataxia beginning in childhood.  Individuals with ataxia-telangiectasia often have a weakened immune system and have an increased risk for childhood cancers.         CHEK2   CHEK2 is a moderate-risk breast cancer gene.  Women who have a mutation in CHEK2 have around a 2-fold increased risk for breast cancer compared to the general population, and this risk may be higher depending upon family history.12,13,14 Mutations in CHEK2 have also been shown to increase the risk of a number of other cancers, including colon and prostate, however these cancer risks are currently not well understood.      Inheritance   All of  the genes reviewed above are inherited in an autosomal dominant pattern. This means that if a parent has a mutation, each of his or her children will have a 50% chance of inheriting that same mutation.  Therefore, each child--male or female--would have a 50% chance of being at increased risk for developing cancer.        Image obtained from Genetics Home Reference, 2013     In rare cases, there can be mutations in both copies of these genes (one from each parent), leading to different autosomal recessive conditions.  Mutations in some genes can occur de jose armando, which means that a person s mutation occurred for the first time in them and was not inherited from a parent.  Now that they have the mutation, however, it can be passed on to future generations.     Genetic Testing  Genetic testing involves a blood test and will look for any harmful mutations within genes that are associated with increased cancer risk.  If possible, it is recommended that the person(s) who has had cancer be tested before other family members.  That person will give us the most useful information about whether or not a specific gene is associated with the cancer in the family.    Results  There are three possible results of genetic testing:    Positive--a harmful mutation was identified in one or more of the genes    Negative--no mutation was identified in any of the genes on this panel    Variant of unknown significance--a variation in one of the genes was identified, but it is unclear how this impacts cancer risk in the family    Advantages and Disadvantages  There are advantages and disadvantages to genetic testing.  Advantages    May clarify your cancer risk    Can help you make medical decisions    May explain the cancers in your family    May give useful information to your family members (if you share your results)    Disadvantages    Possible negative emotional impact of learning about inherited cancer risk    Uncertainty in  interpreting a negative test result in some situations    Possible genetic discrimination concerns (see below)    Genetic Information Nondiscrimination Act (ANNALISE)  ANNALISE is a federal law that protects individuals from health insurance or employment discrimination based on a genetic test result alone.  Although rare, there are currently no legal discrimination protections in terms of life insurance, long term care, or disability insurances. Visit the National Human Genome Research [place] genome.gov/01319542 to learn more.    Reducing Cancer Risk  Each of the genes listed within this handout have nationally recognized cancer screening guidelines that would be recommended for individuals who test positive.  In addition to increased cancer screening, surgeries may be offered or recommended to reduce cancer risk.  Recommendations are based upon an individual s genetic test result as well as their personal and family history of cancer.    Questions to Think About Regarding Genetic Testing    What effect will the test result have on me and my relationship with my family members if I have an inherited gene mutation?  If I don t have a gene mutation?    Should I share my test results, and how will my family react to this news, which may also affect them?    Are my children ready to learn new information that may one day affect their own health?    Resources  FORCE: Facing Our Risk of Cancer Empowered facingourrisk.org   Bright Pink bebrightpink.org   Li-Fraumeni Syndrome Association lfsassociation.org   PTEN World PTENworld.[employer] Communications   No stomach for cancer, Inc. nostomachforcancer.org   Stomach cancer relief network scrnet.org   Collaborative Group of the Americas on Inherited Colorectal Cancer (CGA) cgaicc.com    Cancer Care cancercare.org   American Cancer Society (ACS) cancer.org   National Cancer [place] (NCI) cancer.gov     Cancer Risk Management Program [phone]-[facility]-CANCER ([phone])  ? Arron Gr MS,  [facility]  [phone]  ? Janae Garvey, MS, [facility]  [phone]  ? Trina Isabel, MS, [facility]  [phone]  ? Lyle Acosta, MS, [facility]  [phone]  ? Ayah Pratt, MS, [facility]  [phone]  ? Gloria Huston, MS, [facility]  [phone]  ? Bertha Greene, MS, [facility]  [phone]    References  1. Vasyl SINGER, Sergo PDP, Narod S, Risch BRANDON, Javon JE, Joel JL, Ladi N, Sony H, Jossy O, Ivana A, Eveline B, Heather P, Marty S, Tomas DM, Romero N, Vee E, Jazmin H, Sam E, Marie J, Kd J, Grady B, Tulinius H, Thorlacius S, Eerola H, Francisco H, David K, Shelia OP. Average risks of breast and ovarian cancer associated with BRCA1 or BRCA2 mutations detected in case series unselected for family history: a combined analysis of 222 studies. Am J Hum Onelia. 2003;72:1117-30.  2. Gerry N, Agata M, Joaquín G.  BRCA1 and BRCA2 Hereditary Breast and Ovarian Cancer. Gene Reviews online. 2013.  3. Zak YC, Svetlana S, Katie G, Kilpatrick S. Breast cancer risk among male BRCA1 and BRCA2 mutation carriers. J Natl Cancer Inst. 2007;99:1811-4.  4. Kash DORMAN, Jennifer I, Jaswant J, Danny E, Austin ER, Slick F. Risk of breast cancer in male BRCA2 carriers. J Med Onelia. 2010;47:710-1.  5. Nomi MH, Amina J, Joanna J, Jessica CORREIA, Ju MS, Eng C. Lifetime cancer risks in individuals with germline PTEN mutations. Clin Cancer Res. 2012;18:400-7.  6. Vikkiki R. Cowden Syndrome: A Critical Review of the Clinical Literature. J Onelia . 2009:18:13-27.  7. National Comprehensive Cancer Network. Clinical practice guidelines in oncology, colorectal cancer screening. Available online (registration required). 2013.  8. National Cancer [place]. SEER Cancer Stat Fact Sheets.  December 2013.  9. Vasyl WALLS et al. Breast-Cancer Risk in Families with Mutations in PALB2. NEJM. 2014; 371(6):497-506.  10. Danika SINGER, Henrique D, Tavo S, Michelle P, Maritza T, David M, Toño B, Robin H, Veronica R, Emmanuel K, Venkata DRIVER, Kash DORMAN, Tomas  D, Magan DF, Danielito MR, The Breast Cancer Susceptibility Collaboration (UK) & Coleen TRAORE. JOSS mutations that cause ataxia-telangiectasia are breast cancer susceptibility alleles. Nature Genetics. 2006;38:873-875  11. Santiago N , Herbert Y, Lacie J, Jason L, Alcides GM , Mary ML, Perez S, Hernandez AG, Syngal S, Luiza ML, Aletha J , Monserrat R, Arnulfo SZ, Tarsha JR, Jaja VE, Jennifer M, Volenora B, Soraya N, Luis RH, Cielo KW, and Maggy AP. JOSS mutations in patients with hereditary pancreatic cancer. Cancer Discover. 2012;2:41-46  12. CHEK2 Breast Cancer Case-Control Consortium. CHEK2*1100delC and susceptibility to breast cancer: A collaborative analysis involving 10,860 breast cancer cases and 9,065 controls from 10 studies. Am J Hum [place], 74 (2004), pp. 1154-7518  13. Randy T, Isaiah S, Heriberto K, et al. Spectrum of Mutations in BRCA1, BRCA2, CHEK2, and TP53 in Families at High Risk of Breast Cancer. LONG, 2006;295(12):9932-4251.   14. Taisha C, Mary D, Saad A, et al. Risk of breast cancer in women with a CHEK2 mutation with and without a family history of breast cancer. JClin Oncol. 2011;29:7303-7144.      TURNAROUND TIME  4 - 6 weeks    INSURANCE COVERAGE   A prior authorization will be submitted when your provider submits the order for this panel. Testing will be held until prior authorization is obtained. You will be contacted once prior authorization is completed. For details regarding coverage and out-of-pocket estimates, please contact a  at the [facility] at [phone].    About the [facility]  In collaborative effort with the [facility] and the [place] [employer], the [facility] offers a full range of diagnostic testing services, with a strong focus on quality, accuracy, and timeliness.

## 2020-07-23 NOTE — TELEPHONE ENCOUNTER
"Pt called in to triage in [facility], stated she just had virtual visit with palliative care but was advised to speak with oncology also. State she's lost 4 lbs in the last week, she had chemo on 7/17 and has had diarrhea almost daily 3-6 times a day, fatigue, anxiety, mouth sores and not eating much due to pain with mouth sores. She was told to expect feeling poorly the first 3-5 days but felt the last 2 days has been even worse she has barely left the bed. She only took nausea meds the first day after chemo and can't tell whether she is feeling nauseous or just about to have diarrhea. She had trouble with constipation before so she was hesitant to take more than 1 Imodium a day, she tried 1 on Friday and Saturday, Sunday she didn't have any stools and felt she \"struggled\" to have stools, had small hard stools on Monday, took 2 Senna then Tuesday stools returned as diarrhea 3-5 times a day again. She's afraid to take either Imodium or Senna and have symptoms turn the other way again. She only takes lorazepam and Tylenol PM at bedtime to help sleep. Also stated she is having severe pain around her nostrils though she does not have any drainage, a lot of rectum pain from the diarrhea which is spreading to vaginal wall pain.     Today palliative care prescribed her Ritalin, Morphine, lidocaine gel for her vagina and nostril, and MMW. Pharmacy was out of the MMW, she picked up everything else but is afraid to take anything while awake with her kids afraid of how it will affect her and wants to wait until they're asleep.     Her typical day is waking up between 8-9 am, eating chicken noodle soup or egg on toast, drinking a protein shake an hour or two later. About 3 or 4 pm making dinner. Drinks water all day long, feels hydrated. Mostly concerned abut weight loss, feeling highly anxious. Has a visit scheduled with nutritionist next week. Would like a plan to discuss how best to take her medications during the day, " "incorporate with meals, does not want to \"stack meds on top of meds\". Reassured pt what she was feeling was normal, she denied sob, she denied acute pain, stated baseline tumor pain, did not want to try morphine at this time, she may try 1/2 a lorazepam now and increase protein shakes. Paged Camila GALAN CNP.  "

## 2020-07-23 NOTE — LETTER
7/23/2020         RE: Leah Rangel  [address] [place] [address]        Dear Colleague,    Thank you for referring your patient, Leah Rangel, to the [facility]. Please see a copy of my visit note below.    Palliative Care Outpatient Clinic Consultation Note    Patient:  Leah Rangel    Chief Complaint:   Leah Rangel is a 40 year old female who is being evaluated via a billable video visit today at the request of Dr Gerardo for a palliative care consultation secondary to breast cancer.      The patient's primary care provider is:  Ge Wiggins.     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 Discussed our potential roles for symptom management, support/coping, and decisional support (aka goals of care).    Leah feels very poorly and is globally struggling with her chemotherapy.  First day was fine, worsening since. She is dealing with fatigue, anorexia, various pains, weight loss, and states that within all of this she is still processing the diagnosis itself.     Fatigue: onset after chemotherapy. At this point she is barely able to tolerate taking a shower. The last two days she spent laying in bed. She didn't tolerate dexamethasone which she received after her chemotherapy, felt jittery and was looking forward to complete the course.     Pain: She has three sources of pain. In decreasing severity these are vulvovaginal pain, oral mucositis, pain in the R breast.   She describes constant burning pain affecting her vulvovaginal area. She also has a sensation of vaginal tightness/compression. Applying cold compresses has been helpful to some extent. She hasn't applied any topical agents.   No discharge, no sores/ulcers.  Presentation not consistent with her previous experiences of candidiasis.    She has oral mucositis with diffuse burning/tenderness involving her tongue, palate, buccal mucosa, gums, and less so the pharynx. No sores/ulcers. This has affected her ability to eat. She has been doing soda and salt water rinses with some relief.      The pain in her R breast has been present since diagnosis and is currently tolerable during the day. It does affect sleep at night. She has been taking tylenol PM in addition to 1mg lorazepam at bedtime and has been able to sleep.     Anorexia, weight loss: Her appetite has been poor. In addition due to her mouth sores the has discomfort with most foods. Her taste sensation seems to have started returning in the last day. This is worsened by fear of diarrhea, which seems to get triggered with eating. We discuss this to some extent today. I share that she should see improvement in the coming days as the chemotherapy effect wanes. And I encourage her to eat by a schedule and focus on foods that are easy to swallow, ice cream, shakes, etc.    Nausea has not been an issue.     Since she wants to be present with her family, she has been avoiding taking medications during the day. She is open to trials of medications for symptoms in the hope that with better symptom control she will be able to improve her wellbeing for herself and her family.    Patient's Disease Understanding: appropriate, not discussed in detail today    Coping:  She feels overwhelmed with several aspects of this.   Leah is still processing that this is happening at all.   She is taken aback by how severe her reaction to chemotherapy has been and how poorly she has been tolerating this.   And she is worried about how this affects her family, especially her children.     Functional Status:  2 (Ambulatory and capable of all selfcare but unable to carry out any work activities; may need help with IADLs up and about > 50% of  waking hours)    Social History  Living Situation: with  and children    Children: age 16 and 8    Substance Use/History of misuse: denies    Social History     Tobacco Use     Smoking status: Former Smoker     Start date: 2020     Smokeless tobacco: Never Used   Substance Use Topics     Alcohol use: Yes     Drinks per session: 1 or 2     Comment: very rare social drink     Drug use: Never     Advance Care Planning:  Visit was focused on symptoms. Will address at future visit    Recommendations & Counseliny/o with recently diagnosed breast cancer on chemotherapy, which she hasn't been tolerating well.     Fatigue:   - ritalin 2.5-5mg bid prn    Pain:  - MSIR 7.5-15mg q4h prn   - encouraged Leah to trial taking this during the day with the aim to increase her presence with family via improved control of the vaginal pain  - MMW prn  - lidocaine jelly for vulvovaginal pain  - recommended vaginal moisturizer   - refer to gynecology for evaluation (r/o dysplasia, dermatitis)    Mucositis:   - MMW qid prn    Coping:  - appointment with [facility]     Return to clinic in 2-4 weeks.    Data and Chart Review:    REVIEW OF SYSTEMS:   ROS: 10 point ROS neg other than the symptoms noted above in the HPI and here.      Physical Exam:  Vital Signs not checked, virtual visit    CONSTIT: awake, appears uncomfortable  EENT: MMM, EOMI, no icterus  RESP: reg, nl effort, no cough  MSK: moves x4, DA  SKIN: no rash, no obvious lesions  NEURO: alert, oriented x3  PSYCH: appropriate affect, memory and thought process intact. Tearful at times    The rest of a comprehensive physical examination is deferred  due to public health emergency video visit restrictions.    Medications, current, pertinent:    Dexamethasone after chemo    Lorazepam prn    : ok    Allergies   Allergen Reactions     No Known Drug Allergies      Past Medical History:   Diagnosis Date     Breast cancer (H)      Complication of  anesthesia      PONV (postoperative nausea and vomiting)      Past Surgical History:   Procedure Laterality Date     INSERT PORT VASCULAR ACCESS Left 7/15/2020    Procedure: Port Placement;  Surgeon: Julio Banks MD;  Location: [facility]     IR CHEST PORT PLACEMENT > 5 YRS OF AGE  7/15/2020       Family History  No family history on file.      Lab and imaging data Reviewed:  Comments:           Opioid Risk Tool:   Opioid Risk Tool (ORT):    Family History of Substance Abuse:        Alcohol = 0 pt (no)       Illegal Drugs = 0 pt (no)       Prescription Drugs = 0 pt (no)      Personal History of Substance Abuse:       Alcohol = 0 pt (no)       Illegal Drugs = 0 pt (no)       Prescription Drugs = 0 pt (no)        Age: 0 pt (age < 16; age > 45)      Psychological Disease: 0 pt (none)      ORT Score = 0        0-3: Low risk for opioid abuse       4-7: Moderate risk for opioid abuse       >/= 8: High risk for opioid abuse    [facility] Outpatient Palliative Care Opioid Prescribing Safety Plan    Opioid Safety Education: Reviewed by Deborah Hook MD on 7/23/2020   Opioid Risk Assessment: Performed by Deborah Hook MD on 7/22/2020.  ORT score of 0.   Mood Disorder Assessment: Performed by Deborah Hook MD on 7/22/2020.     reviewed with every prescription, last reviewed by Deborah Hook MD on 7/23/2020     Additional recommendations based on patient's prognosis and indication for opioids include the following:      Expected prognosis >1 year  Risk: Low (ORT<4)  Indication for Opioid Use: Moderate or Strong  Baseline UDT performed on: not performed, virtual visit  Naloxone Script provided if patient also on benzodiazepine: not yet, minimal use expected, risk discussed  Indications for Tapering reviewed: yes 7/23/2020       Video-Visit Details    Type of service:  Video Visit    Physician has received verbal consent for a Video Visit from the patient? Yes    Video Start Time: 0924    Video End Time  "(time video stopped): 1009    Originating Location (pt. Location): Home    Distant Location (provider location):  [facility]     Mode of Communication:  Video Conference via [facility]      Deborah Hook MD  Palliative Medicine  Pager [phone]      Leah Rangel is a 40 year old female who is being evaluated via a billable video visit.      The patient has been notified of following:     \"This video visit will be conducted via a call between you and your physician/provider. We have found that certain health care needs can be provided without the need for an in-person physical exam.  This service lets us provide the care you need with a video conversation.  If a prescription is necessary we can send it directly to your pharmacy.  If lab work is needed we can place an order for that and you can then stop by our lab to have the test done at a later time.    Video visits are billed at different rates depending on your insurance coverage.  Please reach out to your insurance provider with any questions.    If during the course of the call the physician/provider feels a video visit is not appropriate, you will not be charged for this service.\"    Patient has given verbal consent for Video visit? Yes  How would you like to obtain your AVS? MyChart  If you are dropped from the video visit, the video invite should be resent to: Text to cell phone: [phone]  Will anyone else be joining your video visit? No        Video-Visit Details    Type of service:  Video Visit    Video Start Time:    Video End Time:    Originating Location (pt. Location): Home    Distant Location (provider location):  [facility]     Platform used for Video Visit:        Denise Garcia CMA          Again, thank you for allowing me to participate in the care of your patient.        Sincerely,        Deborah Hook MD    "

## 2020-07-23 NOTE — PROGRESS NOTES
"Leah Rangel is a 40 year old female who is being evaluated via a billable video visit.      The patient has been notified of following:     \"This video visit will be conducted via a call between you and your physician/provider. We have found that certain health care needs can be provided without the need for an in-person physical exam.  This service lets us provide the care you need with a video conversation.  If a prescription is necessary we can send it directly to your pharmacy.  If lab work is needed we can place an order for that and you can then stop by our lab to have the test done at a later time.    Video visits are billed at different rates depending on your insurance coverage.  Please reach out to your insurance provider with any questions.    If during the course of the call the physician/provider feels a video visit is not appropriate, you will not be charged for this service.\"    Patient has given verbal consent for Video visit? Yes  How would you like to obtain your AVS? MyChart  If you are dropped from the video visit, the video invite should be resent to: Text to cell phone: [phone]  Will anyone else be joining your video visit? No        Video-Visit Details    Type of service:  Video Visit    Video Start Time:    Video End Time:    Originating Location (pt. Location): Home    Distant Location (provider location):  [facility]     Platform used for Video Visit:        Denise Garcia CMA        "

## 2020-07-23 NOTE — LETTER
7/23/2020         RE: Leah Rangel  [address] [place] [address]        Dear Colleague,    Thank you for referring your patient, Leah Rangel, to the [facility]. Please see a copy of my visit note below.    Palliative Care Outpatient Clinic Consultation Note    Patient:  Leah Rangel    Chief Complaint:   Leah Rangel is a 40 year old female who is being evaluated via a billable video visit today at the request of Dr Gerardo for a palliative care consultation secondary to breast cancer.      The patient's primary care provider is:  Ge Wiggins.     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 Discussed our potential roles for symptom management, support/coping, and decisional support (aka goals of care).    Leah feels very poorly and is globally struggling with her chemotherapy.  First day was fine, worsening since. She is dealing with fatigue, anorexia, various pains, weight loss, and states that within all of this she is still processing the diagnosis itself.     Fatigue: onset after chemotherapy. At this point she is barely able to tolerate taking a shower. The last two days she spent laying in bed. She didn't tolerate dexamethasone which she received after her chemotherapy, felt jittery and was looking forward to complete the course.     Pain: She has three sources of pain. In decreasing severity these are vulvovaginal pain, oral mucositis, pain in the R breast.   She describes constant burning pain affecting her vulvovaginal area. She also has a sensation of vaginal tightness/compression. Applying cold compresses has been helpful to some extent. She hasn't applied any topical agents.   No discharge, no sores/ulcers.  Presentation not consistent with her previous experiences of candidiasis.    She has oral mucositis with diffuse burning/tenderness involving her tongue, palate, buccal mucosa, gums, and less so the pharynx. No sores/ulcers. This has affected her ability to eat. She has been doing soda and salt water rinses with some relief.      The pain in her R breast has been present since diagnosis and is currently tolerable during the day. It does affect sleep at night. She has been taking tylenol PM in addition to 1mg lorazepam at bedtime and has been able to sleep.     Anorexia, weight loss: Her appetite has been poor. In addition due to her mouth sores the has discomfort with most foods. Her taste sensation seems to have started returning in the last day. This is worsened by fear of diarrhea, which seems to get triggered with eating. We discuss this to some extent today. I share that she should see improvement in the coming days as the chemotherapy effect wanes. And I encourage her to eat by a schedule and focus on foods that are easy to swallow, ice cream, shakes, etc.    Nausea has not been an issue.     Since she wants to be present with her family, she has been avoiding taking medications during the day. She is open to trials of medications for symptoms in the hope that with better symptom control she will be able to improve her wellbeing for herself and her family.    Patient's Disease Understanding: appropriate, not discussed in detail today    Coping:  She feels overwhelmed with several aspects of this.   Leah is still processing that this is happening at all.   She is taken aback by how severe her reaction to chemotherapy has been and how poorly she has been tolerating this.   And she is worried about how this affects her family, especially her children.     Functional Status:  2 (Ambulatory and capable of all selfcare but unable to carry out any work activities; may need help with IADLs up and about > 50% of  waking hours)    Social History  Living Situation: with  and children    Children: age 16 and 8    Substance Use/History of misuse: denies    Social History     Tobacco Use     Smoking status: Former Smoker     Start date: 2020     Smokeless tobacco: Never Used   Substance Use Topics     Alcohol use: Yes     Drinks per session: 1 or 2     Comment: very rare social drink     Drug use: Never     Advance Care Planning:  Visit was focused on symptoms. Will address at future visit    Recommendations & Counseliny/o with recently diagnosed breast cancer on chemotherapy, which she hasn't been tolerating well.     Fatigue:   - ritalin 2.5-5mg bid prn    Pain:  - MSIR 7.5-15mg q4h prn   - encouraged Leah to trial taking this during the day with the aim to increase her presence with family via improved control of the vaginal pain  - MMW prn  - lidocaine jelly for vulvovaginal pain  - recommended vaginal moisturizer   - refer to gynecology for evaluation (r/o dysplasia, dermatitis)    Mucositis:   - MMW qid prn    Coping:  - appointment with [facility]     Return to clinic in 2-4 weeks.    Data and Chart Review:    REVIEW OF SYSTEMS:   ROS: 10 point ROS neg other than the symptoms noted above in the HPI and here.      Physical Exam:  Vital Signs not checked, virtual visit    CONSTIT: awake, appears uncomfortable  EENT: MMM, EOMI, no icterus  RESP: reg, nl effort, no cough  MSK: moves x4, DA  SKIN: no rash, no obvious lesions  NEURO: alert, oriented x3  PSYCH: appropriate affect, memory and thought process intact. Tearful at times    The rest of a comprehensive physical examination is deferred  due to public health emergency video visit restrictions.    Medications, current, pertinent:    Dexamethasone after chemo    Lorazepam prn    : ok    Allergies   Allergen Reactions     No Known Drug Allergies      Past Medical History:   Diagnosis Date     Breast cancer (H)      Complication of  anesthesia      PONV (postoperative nausea and vomiting)      Past Surgical History:   Procedure Laterality Date     INSERT PORT VASCULAR ACCESS Left 7/15/2020    Procedure: Port Placement;  Surgeon: Julio Banks MD;  Location: [facility]     IR CHEST PORT PLACEMENT > 5 YRS OF AGE  7/15/2020       Family History  No family history on file.      Lab and imaging data Reviewed:  Comments:           Opioid Risk Tool:   Opioid Risk Tool (ORT):    Family History of Substance Abuse:        Alcohol = 0 pt (no)       Illegal Drugs = 0 pt (no)       Prescription Drugs = 0 pt (no)      Personal History of Substance Abuse:       Alcohol = 0 pt (no)       Illegal Drugs = 0 pt (no)       Prescription Drugs = 0 pt (no)        Age: 0 pt (age < 16; age > 45)      Psychological Disease: 0 pt (none)      ORT Score = 0        0-3: Low risk for opioid abuse       4-7: Moderate risk for opioid abuse       >/= 8: High risk for opioid abuse    [facility] Outpatient Palliative Care Opioid Prescribing Safety Plan    Opioid Safety Education: Reviewed by Deborah Hook MD on 7/23/2020   Opioid Risk Assessment: Performed by Deborah Hook MD on 7/22/2020.  ORT score of 0.   Mood Disorder Assessment: Performed by Deborah Hook MD on 7/22/2020.     reviewed with every prescription, last reviewed by Deborah Hook MD on 7/23/2020     Additional recommendations based on patient's prognosis and indication for opioids include the following:      Expected prognosis >1 year  Risk: Low (ORT<4)  Indication for Opioid Use: Moderate or Strong  Baseline UDT performed on: not performed, virtual visit  Naloxone Script provided if patient also on benzodiazepine: not yet, minimal use expected, risk discussed  Indications for Tapering reviewed: yes 7/23/2020       Video-Visit Details    Type of service:  Video Visit    Physician has received verbal consent for a Video Visit from the patient? Yes    Video Start Time: 0924    Video End Time  "(time video stopped): 1009    Originating Location (pt. Location): Home    Distant Location (provider location):  [facility]     Mode of Communication:  Video Conference via [facility]      Deborah Hook MD  Palliative Medicine  Pager [phone]      Leah Rangel is a 40 year old female who is being evaluated via a billable video visit.      The patient has been notified of following:     \"This video visit will be conducted via a call between you and your physician/provider. We have found that certain health care needs can be provided without the need for an in-person physical exam.  This service lets us provide the care you need with a video conversation.  If a prescription is necessary we can send it directly to your pharmacy.  If lab work is needed we can place an order for that and you can then stop by our lab to have the test done at a later time.    Video visits are billed at different rates depending on your insurance coverage.  Please reach out to your insurance provider with any questions.    If during the course of the call the physician/provider feels a video visit is not appropriate, you will not be charged for this service.\"    Patient has given verbal consent for Video visit? Yes  How would you like to obtain your AVS? MyChart  If you are dropped from the video visit, the video invite should be resent to: Text to cell phone: [phone]  Will anyone else be joining your video visit? No        Video-Visit Details    Type of service:  Video Visit    Video Start Time:    Video End Time:    Originating Location (pt. Location): Home    Distant Location (provider location):  [facility]     Platform used for Video Visit:        Denise Garcia CMA          Again, thank you for allowing me to participate in the care of your patient.        Sincerely,        Deborah Hook MD    "

## 2020-07-29 ENCOUNTER — VIRTUAL VISIT (OUTPATIENT)
Dept: ONCOLOGY | Facility: CLINIC | Age: 40
End: 2020-07-29
Attending: DIETITIAN, REGISTERED
Payer: COMMERCIAL

## 2020-07-29 DIAGNOSIS — C50.911 MALIGNANT NEOPLASM OF RIGHT FEMALE BREAST, UNSPECIFIED ESTROGEN RECEPTOR STATUS, UNSPECIFIED SITE OF BREAST (H): Primary | ICD-10-CM

## 2020-07-29 PROCEDURE — 97802 MEDICAL NUTRITION INDIV IN: CPT | Mod: GT,ZF | Performed by: DIETITIAN, REGISTERED

## 2020-07-29 NOTE — LETTER
"    7/29/2020         RE: Leah Rangel  [address] [place] [address]        Dear Colleague,    Thank you for referring your patient, Leah Rangel, to the [facility]. Please see a copy of my visit note below.    Leah Rangel is a 40 year old female who is being evaluated via a billable telephone visit.          CLINICAL NUTRITION SERVICES - ASSESSMENT NOTE    Leah Rangel 40 year old referred for MNT related to breast cancer    Time Spent: 45 minutes  Visit Type: Video  Referring Physician: Akin 7/17    Nutrition Prescription   Recommendations Suggested by Registered Dietitian (RD):   1. 5-6 small, frequent meals  2. 1600kcal/day, 70g protein/day  3. 10 cups non-caffiene containing beverages (choose 2-3 cups electrolyte fluids daily)   Malnutrition: non-severe due to chronic acute illness     NUTRITION HISTORY  Factors affecting nutrition intake include:diarrhea, mucositis  Current diet: general, low fiber  Current appetite/intake: fair  Chemotherapy: Taxotere and carboplatin       Leah tells me that her intake has been low as she has sitophobia from diarrhea.    She has been having at least 10 loose stools/day.    She has been focused on reducing fiber and raw vegetables but has not noticed any improvement.  She is taking imodium but does not feel like that is working either.    She has been choosing more BRAT based/bland diet.    She is looking for alternative suggestions food or meds that may help reduce her stool frequency.   She expresses her concern with her ongoing weight loss.      Diet Recall  Breakfast Eggs, toast or cereal   Lunch Yogurt, protein shake   Dinner [place], bread, cooked vegetables   Snacks Cottage cheese or smoothie with pnb, spinach, fruit and yogurt   Beverages >10 cups water, will start drinking Gatorade zero due to frequent loose stools.      ANTHROPOMETRICS  Height: 64\"  Weight: 100 lbs/45kg  BMI: 17  Weight Status:  Underweight BMI " <18.5  IBW: 120 lbs (80%)  Weight History: down 4 lb x past 2 weeks  Wt Readings from Last 7 Encounters:   07/23/20 44.5 kg (98 lb)   07/17/20 46.7 kg (102 lb 13.6 oz)   07/15/20 45.8 kg (101 lb)   07/15/20 45.4 kg (100 lb)   07/09/20 51.7 kg (114 lb)   07/14/04 44.9 kg (99 lb)       Dosing Weight: 45kg    Medications/vitamins/minerals/herbals:   Reviewed    Labs:   Labs reviewed    NUTRITION FOCUSED PHYSICAL ASSESSMENT FOR DIAGNOSING MALNUTRITION:  Not observed due to covid pandemic     ASSESSED NUTRITION NEEDS:  Estimated Energy Needs: 1600 kcals (35 Kcal/Kg)  Justification: repletion  Estimated Protein Needs: 70 grams protein (1.5 g pro/Kg)  Justification: Repletion  Estimated Fluid Needs: 2000  mL   Justification: maintenance    MALNUTRITION:  % Weight Loss:  > 5% in 1 month (severe malnutrition)  % Intake:  </= 50% for >/= 5 days (severe malnutrition)  Subcutaneous Fat Loss:  Orbital region mild depletion  Muscle Loss:  Clavicle bone region moderate depletion  Fluid Retention:  None noted    Malnutrition Diagnosis: Non-Severe malnutrition  In Context of:  Chronic illness or disease    NUTRITION DIAGNOSIS:  Inadequate oral intake related to decreased intake with diarrhea as evidenced by 4% wt loss x past 2 weeks    INTERVENTIONS  Provided written & verbal education:   - Discussed ways to maximize and fortify foods with calories and protein via small frequent meals.    - Advised pt to aim for at least 1600kcal and 70-100g protein daily.   - Suggested she try Healios BID for mouth sores (arginade and glutamine).    - Suggested she try Banatrol BID for diarrhea.   - Encouraged >10 cups water (with 2-3 cups as electrolyte fluids).   - Reviewed common barriers to eating with treatment and as treatment progresses. Discussed ways to cope with diarrhea.   - Reviewed ONS options (Mass Pro weight molly, Ensure Enlive, Ensure Plus/Boost Plus, CIB, Benecalorie etc). Encouraged utilizing these ONS in home made  shakes/smoothies to prevent flavor fatigue.  Encouraged pt to start consuming 2 ONS or home made shakes/smoothies daily.       Pt verbalize understanding of materials provided during consult.   Patient Understanding: Excellent  Expected Compliance: Excellent     Goals  1.  Aim for 5-6 small frequent meals  2.  Aim for 1600kcal and 70g protein/day  3. Weight maintenance /weight gain towards 105 lbs.       Follow-Up Plans: Pt has RD contact information for questions.      Michelle Araujo RDN, LD

## 2020-07-30 NOTE — PROGRESS NOTES
"Leah Rangel is a 40 year old female who is being evaluated via a billable telephone visit.      The patient has been notified of following:     \"This telephone visit will be conducted via a call between you and your physician/provider. We have found that certain health care needs can be provided without the need for a physical exam.  This service lets us provide the care you need with a short phone conversation.  If a prescription is necessary we can send it directly to your pharmacy.  If lab work is needed we can place an order for that and you can then stop by our lab to have the test done at a later time.    Telephone visits are billed at different rates depending on your insurance coverage. During this emergency period, for some insurers they may be billed the same as an in-person visit.  Please reach out to your insurance provider with any questions.    If during the course of the call the physician/provider feels a telephone visit is not appropriate, you will not be charged for this service.\"    Patient has given verbal consent for Telephone visit?  Yes    CLINICAL NUTRITION SERVICES - ASSESSMENT NOTE    Leah Rangel 40 year old referred for MNT related to breast cancer    Time Spent: 45 minutes  Visit Type: Video  Referring Physician: Akin 7/17    Nutrition Prescription   Recommendations Suggested by Registered Dietitian (RD):   1. 5-6 small, frequent meals  2. 1600kcal/day, 70g protein/day  3. 10 cups non-caffiene containing beverages (choose 2-3 cups electrolyte fluids daily)   Malnutrition: non-severe due to chronic acute illness     NUTRITION HISTORY  Factors affecting nutrition intake include:diarrhea, mucositis  Current diet: general, low fiber  Current appetite/intake: fair  Chemotherapy: Taxotere and carboplatin       Leah tells me that her intake has been low as she has sitophobia from diarrhea.    She has been having at least 10 loose stools/day.    She has been focused on reducing " "fiber and raw vegetables but has not noticed any improvement.  She is taking imodium but does not feel like that is working either.    She has been choosing more BRAT based/bland diet.    She is looking for alternative suggestions food or meds that may help reduce her stool frequency.   She expresses her concern with her ongoing weight loss.      Diet Recall  Breakfast Eggs, toast or cereal   Lunch Yogurt, protein shake   Dinner [place], bread, cooked vegetables   Snacks Cottage cheese or smoothie with pnb, spinach, fruit and yogurt   Beverages >10 cups water, will start drinking Gatorade zero due to frequent loose stools.      ANTHROPOMETRICS  Height: 64\"  Weight: 100 lbs/45kg  BMI: 17  Weight Status:  Underweight BMI <18.5  IBW: 120 lbs (80%)  Weight History: down 4 lb x past 2 weeks  Wt Readings from Last 7 Encounters:   07/23/20 44.5 kg (98 lb)   07/17/20 46.7 kg (102 lb 13.6 oz)   07/15/20 45.8 kg (101 lb)   07/15/20 45.4 kg (100 lb)   07/09/20 51.7 kg (114 lb)   07/14/04 44.9 kg (99 lb)       Dosing Weight: 45kg    Medications/vitamins/minerals/herbals:   Reviewed    Labs:   Labs reviewed    NUTRITION FOCUSED PHYSICAL ASSESSMENT FOR DIAGNOSING MALNUTRITION:  Not observed due to covid pandemic     ASSESSED NUTRITION NEEDS:  Estimated Energy Needs: 1600 kcals (35 Kcal/Kg)  Justification: repletion  Estimated Protein Needs: 70 grams protein (1.5 g pro/Kg)  Justification: Repletion  Estimated Fluid Needs: 2000  mL   Justification: maintenance    MALNUTRITION:  % Weight Loss:  > 5% in 1 month (severe malnutrition)  % Intake:  </= 50% for >/= 5 days (severe malnutrition)  Subcutaneous Fat Loss:  Orbital region mild depletion  Muscle Loss:  Clavicle bone region moderate depletion  Fluid Retention:  None noted    Malnutrition Diagnosis: Non-Severe malnutrition  In Context of:  Chronic illness or disease    NUTRITION DIAGNOSIS:  Inadequate oral intake related to decreased intake with diarrhea as evidenced by 4% wt loss " x past 2 weeks    INTERVENTIONS  Provided written & verbal education:   - Discussed ways to maximize and fortify foods with calories and protein via small frequent meals.    - Advised pt to aim for at least 1600kcal and 70-100g protein daily.   - Suggested she try Healios BID for mouth sores (arginade and glutamine).    - Suggested she try Banatrol BID for diarrhea.   - Encouraged >10 cups water (with 2-3 cups as electrolyte fluids).   - Reviewed common barriers to eating with treatment and as treatment progresses. Discussed ways to cope with diarrhea.   - Reviewed ONS options (Mass Pro weight molly, Ensure Enlive, Ensure Plus/Boost Plus, CIB, Benecalorie etc). Encouraged utilizing these ONS in home made shakes/smoothies to prevent flavor fatigue.  Encouraged pt to start consuming 2 ONS or home made shakes/smoothies daily.       Pt verbalize understanding of materials provided during consult.   Patient Understanding: Excellent  Expected Compliance: Excellent     Goals  1.  Aim for 5-6 small frequent meals  2.  Aim for 1600kcal and 70g protein/day  3. Weight maintenance /weight gain towards 105 lbs.       Follow-Up Plans: Pt has RD contact information for questions.      Michelle Araujo RDN, LD

## 2020-08-05 NOTE — PROGRESS NOTES
"Leah Rangel is a 40 year old female who is being evaluated via a billable video visit.      The patient has been notified of following:     \"This video visit will be conducted via a call between you and your physician/provider. We have found that certain health care needs can be provided without the need for an in-person physical exam.  This service lets us provide the care you need with a video conversation.  If a prescription is necessary we can send it directly to your pharmacy.  If lab work is needed we can place an order for that and you can then stop by our lab to have the test done at a later time.    Video visits are billed at different rates depending on your insurance coverage.  Please reach out to your insurance provider with any questions.    If during the course of the call the physician/provider feels a video visit is not appropriate, you will not be charged for this service.\"    Patient has given verbal consent for Video visit? Yes  How would you like to obtain your AVS? MyChart  If you are dropped from the video visit, the video invite should be resent to: Send to e-mail at: [email or web address]  Will anyone else be joining your video visit? No      I have reviewed and updated the patient's allergies and medication list.    Concerns: No new concerns.   Refills: None needed.    Vitals - Patient Reported  Weight (Patient Reported): 46.7 kg (103 lb)  Height (Patient Reported): 162.6 cm (5' 4\")  BMI (Based on Pt Reported Ht/Wt): 17.68  Pain Score: No Pain (0)     Katelynn Carmichael CMA    Video-Visit Details    Type of service:  Video Visit    Video Start Time: 1023  Video End Time: 1047    Originating Location (pt. Location): Home    Distant Location (provider location):  [facility]     Platform used for Video Visit: [employer]    Due to technical issues, there was an additional phone call from [phone]    Nicole Reynolds CNP    August 6, 2020     Reason for Visit: f/u R " breast cancer    Oncology HPI:   Leah Rangel is a  40 year old, premenopausal female, diagnosed with new R breast cancer.      Leah reports that she has a history of bilateral implants.  She noticed a palpable mass involving her right breast in the upper inner quadrant in early 06/2020.  At that time, she went in for an evaluation with a mammogram and ultrasound.  Mammogram on 06/11/2020 revealed a 7 mm mass at the 1 o'clock position approximately 5 cm from the nipple in the upper inner quadrant of the right breast.  There was a second area measuring 6 x 5 x 4 mm at the 1 o'clock position 6 cm from the nipple.  In the left breast, there was an area of microcalcifications.  She was referred for a right breast biopsy.  Both areas at the 1 o'clock position were biopsied.  No lymph nodes were appreciated.  Both of these biopsies revealed an invasive ductal carcinoma, grade 3, ER positive, WI positive, HER-2 positive, 3+ by IHC.  She met with a surgeon, a plastic surgeon as well as a medical oncologist, Dr. Adelso Balderas.  She was discussed having neoadjuvant chemotherapy followed by bilateral mastectomy and reconstruction.  She is anxious to get started with therapy.  She has been discussing getting a second opinion and as a result comes to see me.  At the time of evaluation by Dr. Adelso Balderas, she was felt to have a palpable right axillary lymph node measuring 1.5 cm and was considered to have a T1b multifocal N1 breast cancer.      On evaluation, her imaging is as described above.  She was subsequently referred for a breast MRI.  The BMRI done on 7/10/20 showed a 4.5 x 2.4 x 3 cm mass in the R upper, inner breast. She underwent a PET/CT 7/16 and started neoadjuvant treatment with Docetaxel/Carboplatin/Trastuzumab/Pertuzumab (TCHP) 7/1720.     Interval history:   Leah has finally been feeling better this week after a rough period of time following her first chemo infusion. Her most severe symptom was diarrhea  with 10-15 stools per day in the days following infusion. She took imodium but reports she did so sporadically, and was worried to make herself constipated. She had pretty bothersome vaginal burning and irritation that she thinks was related to her diarrhea. No dysuria or abnormal discharge. It improved around the same time the rest of her symptoms did. She found cool maxi pads helpful.     She lost about 5 lbs but has been able to gain almost all that weight back. She experienced mouth discomfort but no actual open sores. She was using salt and soda swishes, didn't like MMW. Tried to stick to BRAT diet. Nausea was present in the first 5 days or so after treatment. She took zofran in the morning those days but did not take another anti emetic for the rest of the day. No vomiting. She never ended up trying the Ritalin palliative gave her.     Her right breast pain is much improved and she reports her tumor has decreased in size which she is relieved about. She took morphine a couple times but has not needed in >1 week. She also reports discomfort with her hair beginning to fall out. She elected to shave her head which has helped but it is still uncomfortable for her at times.     Leah is sleeping okay at night, taking 2 ativan and 2 tylenol pm. Denies feeling groggy in the morning. Overall she is trying to have a positive attitude, optimistic this next cycle will be better as she knows what to expect. She walks outside everyday, even if just to her mailbox and back. She noticed minimal neuropathy following treatment but that has resolved.     No fever/chills. No SOB/cough/cp. No rashes/lesions.     Current Outpatient Medications   Medication Sig Dispense Refill     dexamethasone (DECADRON) 4 MG tablet Take 2 tablets (8 mg) by mouth 2 times daily Evening AFTER Docetaxel and continue for 4 additional doses. (Patient not taking: Reported on 7/23/2020) 10 tablet 5     diazepam (VALIUM) 5 MG tablet Take one tablet one  hour prior to scan. May repeat dose one time. (Patient not taking: Reported on 7/23/2020) 2 tablet 0     lidocaine (XYLOCAINE) 2 % external gel Apply topically 3 times daily as needed for moderate pain 30 mL 1     lidocaine-prilocaine (EMLA) 2.5-2.5 % external cream Apply topically as needed for moderate pain Apply to PORT site 45 minutes prior to procedure.  Cover with a non-absorbent dressing. (Patient not taking: Reported on 7/23/2020) 30 g 4     LORazepam (ATIVAN) 0.5 MG tablet Take 1 tablet (0.5 mg) by mouth every 4 hours as needed (Anxiety, Nausea/Vomiting or Sleep) 30 tablet 5     magic mouthwash suspension, diphenhydrAMINE, lidocaine, aluminum-magnesium & simethicone, (FIRST-MOUTHWASH BLM) compounding kit Swish and swallow 5-10 mLs in mouth every 6 hours as needed for mouth sores 237 mL 1     methylphenidate (RITALIN) 5 MG tablet Take 0.5-1 tablets (2.5-5 mg) by mouth 2 times daily as needed (fatigue) Take no later than 2pm 60 tablet 0     morphine (MSIR) 15 MG IR tablet Take 0.5-1 tablets (7.5-15 mg) by mouth every 4 hours as needed for severe pain 45 tablet 0     ondansetron (ZOFRAN) 8 MG tablet Take 1 tablet (8 mg) by mouth every 8 hours as needed (Nausea/Vomiting) 10 tablet 5     order for DME Equipment: Wig  Diagnosis: cranial alopecia (Patient not taking: Reported on 7/23/2020) 1 each 0     prochlorperazine (COMPAZINE) 10 MG tablet Take 1 tablet (10 mg) by mouth every 6 hours as needed (Nausea/Vomiting) (Patient not taking: Reported on 7/23/2020) 30 tablet 5     Vaginal Lubricant (REPLENS) GEL Apply vaginally as needed 35 g 1     Allergies   Allergen Reactions     No Known Drug Allergies      Objective:   There were no vitals taken for this visit.  Wt Readings from Last 4 Encounters:   07/23/20 44.5 kg (98 lb)   07/17/20 46.7 kg (102 lb 13.6 oz)   07/15/20 45.8 kg (101 lb)   07/15/20 45.4 kg (100 lb)     Video physical exam  General: Patient appears well in no acute distress. Normal body  habitus.  Skin: No visualized rash or lesions on visualized skin  Eyes: EOMI, no erythema, sclera icterus or discharge noted  Resp: Appears to be breathing comfortably without accessory muscle usage, speaking in full sentences, no cough  MSK: Appears to have normal range of motion based on visualized movements  Neurologic: No apparent tremors, facial movements symmetric  Psych: affect good, alert and oriented; engaged    The rest of a comprehensive physical examination is deferred due to PHE (public health emergency) video restrictions    Labs: tomorrow prior to infusion     Imaging: n/a    Impression/plan:   ER/KS +, HER-2 positive R upper/inner quadrant breast cancer.   -Leah really struggled after her first chemotherapy. Her main side effects were diarrhea, fatigue, nausea, and mouth soreness; all contributing to poor nutrition and weight loss. I am happy today that she reports nearly a full recovery from these side effects in preparation for her next treatment tomorrow. We discussed how this next round of chemotherapy will hopefully go better because she knows what to anticipate and how to manage her symptoms. She is feeling ready to proceed with infusion tomorrow. I agree it will be okay to proceed without dose reduction, pending labs.  -We went through the tentative remainder of her schedule as she had many questions about the timeline including that of her mastectomy and continuation of perjeta and herceptin for one year q 3 weeks. And the addition of an anti-estrogen therapy at some point too.     Fatigue: 2/2 chemotherapy. Improved the second week after chemo. Encouraged her to continue daily activity. She is eager to meet with physical therapy tomorrow for more strategies to stay active at home.     Pain:  right breast-The pain in her right breast has greatly decreased, along with the size of her tumor. She has not required morphine in over 1 week.    vulvovaginal pain-This may have been from skin  irritation 2/2 frequent diarrhea stooling, especially since it as improved as her diarrhea has improved. She had no dysuria or abnormal discharge that would suggest at vaginal or urinary tract infection. She found relief from cool maxi pads, which she can continue. Also suggested using warm water cleanse during amor care. Ideally, with more control of her diarrhea this will be less of an issue moving forward.     Scalp pain: She noticed quite a bit of sensitivity with her hair starting to fall out. She ended up shaving her head last weekend which has helped but still with some sensitivity when something rubs against her head. She is hoping this will improve when the rest of her hair falls out.     Mucositis--burning/tenderness more so, no sores. She followed a BRAT diet and avoided acidic food; continue. She didn't like MMW but has been doing salt and soda swishes.      Anorexia, weight loss; 2/2 fatigue, diarrhea, nausea. She met with the dietician last week and discussed strategies for weight gain moving forward during chemotherapy. Since she has started to feel better in the last week she has gained some weight back. Continue to follow.     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 She is very encouraged that her tumor is shrinking in size as it makes all these challenges from chemo toxicities worth it.     Diarrhea: quite severe at 10-15 episodes per day at its peak. Discussed proper use of imodium with loading dose of 2 tabs after first diarrhea, and then 1 tab thereafter following each diarrhea stool, up to 8 tabs per day. Encouraged her to contact us should her diarrhea not be controlled with this plan.     Nausea: Most notable in the 5 days following treatment. She did take the dexamethasone and would take the zofran every morning while she was taking the steroid. Confirmed she is taking dex with food. Discussed anti-emetic schedule she  can follow should she need more relief than 1 dose of zofran per day. Thankfully, she was able to keep up with fluid PO intake despite the nausea.     Nicole Reynolds CNP on 8/6/2020 at 1:48 PM

## 2020-08-06 ENCOUNTER — VIRTUAL VISIT (OUTPATIENT)
Dept: ONCOLOGY | Facility: CLINIC | Age: 40
End: 2020-08-06
Attending: NURSE PRACTITIONER
Payer: COMMERCIAL

## 2020-08-06 DIAGNOSIS — Z17.0 MALIGNANT NEOPLASM OF UPPER-INNER QUADRANT OF RIGHT BREAST IN FEMALE, ESTROGEN RECEPTOR POSITIVE (H): ICD-10-CM

## 2020-08-06 DIAGNOSIS — C50.911 MALIGNANT NEOPLASM OF RIGHT FEMALE BREAST, UNSPECIFIED ESTROGEN RECEPTOR STATUS, UNSPECIFIED SITE OF BREAST (H): Primary | ICD-10-CM

## 2020-08-06 DIAGNOSIS — C50.211 MALIGNANT NEOPLASM OF UPPER-INNER QUADRANT OF RIGHT BREAST IN FEMALE, ESTROGEN RECEPTOR POSITIVE (H): ICD-10-CM

## 2020-08-06 PROCEDURE — 99214 OFFICE O/P EST MOD 30 MIN: CPT | Mod: GT | Performed by: NURSE PRACTITIONER

## 2020-08-06 PROCEDURE — 40001009 ZZH VIDEO/TELEPHONE VISIT; NO CHARGE

## 2020-08-06 RX ORDER — ACETAMINOPHEN 325 MG/1
650 TABLET ORAL
Status: CANCELLED
Start: 2020-08-07

## 2020-08-06 RX ORDER — HEPARIN SODIUM,PORCINE 10 UNIT/ML
5 VIAL (ML) INTRAVENOUS
Status: CANCELLED | OUTPATIENT
Start: 2020-08-07

## 2020-08-06 RX ORDER — LORAZEPAM 2 MG/ML
0.5 INJECTION INTRAMUSCULAR EVERY 4 HOURS PRN
Status: CANCELLED
Start: 2020-08-07

## 2020-08-06 RX ORDER — EPINEPHRINE 1 MG/ML
0.3 INJECTION, SOLUTION INTRAMUSCULAR; SUBCUTANEOUS EVERY 5 MIN PRN
Status: CANCELLED | OUTPATIENT
Start: 2020-08-07

## 2020-08-06 RX ORDER — METHYLPREDNISOLONE SODIUM SUCCINATE 125 MG/2ML
125 INJECTION, POWDER, LYOPHILIZED, FOR SOLUTION INTRAMUSCULAR; INTRAVENOUS
Status: CANCELLED
Start: 2020-08-07

## 2020-08-06 RX ORDER — DIPHENHYDRAMINE HCL 25 MG
50 CAPSULE ORAL
Status: CANCELLED | OUTPATIENT
Start: 2020-08-07

## 2020-08-06 RX ORDER — SODIUM CHLORIDE 9 MG/ML
1000 INJECTION, SOLUTION INTRAVENOUS CONTINUOUS PRN
Status: CANCELLED
Start: 2020-08-07

## 2020-08-06 RX ORDER — ALBUTEROL SULFATE 90 UG/1
1-2 AEROSOL, METERED RESPIRATORY (INHALATION)
Status: CANCELLED
Start: 2020-08-07

## 2020-08-06 RX ORDER — ONDANSETRON 8 MG/1
8 TABLET, FILM COATED ORAL EVERY 8 HOURS PRN
Qty: 30 TABLET | Refills: 3 | Status: SHIPPED | OUTPATIENT
Start: 2020-08-06 | End: 2020-11-22

## 2020-08-06 RX ORDER — HEPARIN SODIUM (PORCINE) LOCK FLUSH IV SOLN 100 UNIT/ML 100 UNIT/ML
5 SOLUTION INTRAVENOUS
Status: CANCELLED | OUTPATIENT
Start: 2020-08-07

## 2020-08-06 RX ORDER — ALBUTEROL SULFATE 0.83 MG/ML
2.5 SOLUTION RESPIRATORY (INHALATION)
Status: CANCELLED | OUTPATIENT
Start: 2020-08-07

## 2020-08-06 RX ORDER — NALOXONE HYDROCHLORIDE 0.4 MG/ML
.1-.4 INJECTION, SOLUTION INTRAMUSCULAR; INTRAVENOUS; SUBCUTANEOUS
Status: CANCELLED | OUTPATIENT
Start: 2020-08-07

## 2020-08-06 RX ORDER — DIPHENHYDRAMINE HYDROCHLORIDE 50 MG/ML
50 INJECTION INTRAMUSCULAR; INTRAVENOUS
Status: CANCELLED
Start: 2020-08-07

## 2020-08-06 RX ORDER — MEPERIDINE HYDROCHLORIDE 25 MG/ML
25 INJECTION INTRAMUSCULAR; INTRAVENOUS; SUBCUTANEOUS EVERY 30 MIN PRN
Status: CANCELLED | OUTPATIENT
Start: 2020-08-07

## 2020-08-06 NOTE — Clinical Note
August 13, 2020       TO: Leah Rangel  [address] [place] [address]       DearMs.Claire,    We are writing to inform you of your test results.    {p results letter list:[id number]}    No results found from the In Basket message.    ***

## 2020-08-07 ENCOUNTER — INFUSION THERAPY VISIT (OUTPATIENT)
Dept: ONCOLOGY | Facility: CLINIC | Age: 40
End: 2020-08-07
Attending: INTERNAL MEDICINE
Payer: COMMERCIAL

## 2020-08-07 ENCOUNTER — DOCUMENTATION ONLY (OUTPATIENT)
Dept: ONCOLOGY | Facility: CLINIC | Age: 40
End: 2020-08-07

## 2020-08-07 ENCOUNTER — VIRTUAL VISIT (OUTPATIENT)
Dept: PHYSICAL THERAPY | Facility: CLINIC | Age: 40
End: 2020-08-07
Attending: INTERNAL MEDICINE
Payer: COMMERCIAL

## 2020-08-07 ENCOUNTER — APPOINTMENT (OUTPATIENT)
Dept: LAB | Facility: CLINIC | Age: 40
End: 2020-08-07
Attending: INTERNAL MEDICINE
Payer: COMMERCIAL

## 2020-08-07 VITALS
DIASTOLIC BLOOD PRESSURE: 73 MMHG | OXYGEN SATURATION: 96 % | WEIGHT: 104.8 LBS | BODY MASS INDEX: 17.99 KG/M2 | TEMPERATURE: 98.1 F | RESPIRATION RATE: 18 BRPM | HEART RATE: 92 BPM | SYSTOLIC BLOOD PRESSURE: 118 MMHG

## 2020-08-07 DIAGNOSIS — Z17.0 MALIGNANT NEOPLASM OF UPPER-INNER QUADRANT OF RIGHT BREAST IN FEMALE, ESTROGEN RECEPTOR POSITIVE (H): Primary | ICD-10-CM

## 2020-08-07 DIAGNOSIS — R53.81 PHYSICAL DECONDITIONING: Primary | ICD-10-CM

## 2020-08-07 DIAGNOSIS — C50.211 MALIGNANT NEOPLASM OF UPPER-INNER QUADRANT OF RIGHT BREAST IN FEMALE, ESTROGEN RECEPTOR POSITIVE (H): Primary | ICD-10-CM

## 2020-08-07 LAB
ALBUMIN SERPL-MCNC: 3.6 G/DL (ref 3.4–5)
ALP SERPL-CCNC: 70 U/L (ref 40–150)
ALT SERPL W P-5'-P-CCNC: 25 U/L (ref 0–50)
ANION GAP SERPL CALCULATED.3IONS-SCNC: 4 MMOL/L (ref 3–14)
AST SERPL W P-5'-P-CCNC: 12 U/L (ref 0–45)
BASOPHILS # BLD AUTO: 0 10E9/L (ref 0–0.2)
BASOPHILS NFR BLD AUTO: 0.4 %
BILIRUB SERPL-MCNC: 0.4 MG/DL (ref 0.2–1.3)
BUN SERPL-MCNC: 7 MG/DL (ref 7–30)
CALCIUM SERPL-MCNC: 8.6 MG/DL (ref 8.5–10.1)
CHLORIDE SERPL-SCNC: 107 MMOL/L (ref 94–109)
CO2 SERPL-SCNC: 29 MMOL/L (ref 20–32)
CREAT SERPL-MCNC: 0.56 MG/DL (ref 0.52–1.04)
DIFFERENTIAL METHOD BLD: ABNORMAL
EOSINOPHIL # BLD AUTO: 0.2 10E9/L (ref 0–0.7)
EOSINOPHIL NFR BLD AUTO: 1.8 %
ERYTHROCYTE [DISTWIDTH] IN BLOOD BY AUTOMATED COUNT: 13.2 % (ref 10–15)
GFR SERPL CREATININE-BSD FRML MDRD: >90 ML/MIN/{1.73_M2}
GLUCOSE SERPL-MCNC: 101 MG/DL (ref 70–99)
HCT VFR BLD AUTO: 36.2 % (ref 35–47)
HGB BLD-MCNC: 12.7 G/DL (ref 11.7–15.7)
IMM GRANULOCYTES # BLD: 0 10E9/L (ref 0–0.4)
IMM GRANULOCYTES NFR BLD: 0.2 %
LYMPHOCYTES # BLD AUTO: 2 10E9/L (ref 0.8–5.3)
LYMPHOCYTES NFR BLD AUTO: 21.5 %
MCH RBC QN AUTO: 37.5 PG (ref 26.5–33)
MCHC RBC AUTO-ENTMCNC: 35.1 G/DL (ref 31.5–36.5)
MCV RBC AUTO: 107 FL (ref 78–100)
MONOCYTES # BLD AUTO: 0.4 10E9/L (ref 0–1.3)
MONOCYTES NFR BLD AUTO: 4.1 %
NEUTROPHILS # BLD AUTO: 6.5 10E9/L (ref 1.6–8.3)
NEUTROPHILS NFR BLD AUTO: 72 %
NRBC # BLD AUTO: 0 10*3/UL
NRBC BLD AUTO-RTO: 0 /100
PLATELET # BLD AUTO: 111 10E9/L (ref 150–450)
POTASSIUM SERPL-SCNC: 3.9 MMOL/L (ref 3.4–5.3)
PROT SERPL-MCNC: 7.1 G/DL (ref 6.8–8.8)
RBC # BLD AUTO: 3.39 10E12/L (ref 3.8–5.2)
SODIUM SERPL-SCNC: 140 MMOL/L (ref 133–144)
WBC # BLD AUTO: 9.1 10E9/L (ref 4–11)

## 2020-08-07 PROCEDURE — 80053 COMPREHEN METABOLIC PANEL: CPT | Performed by: INTERNAL MEDICINE

## 2020-08-07 PROCEDURE — 25800030 ZZH RX IP 258 OP 636: Mod: ZF | Performed by: NURSE PRACTITIONER

## 2020-08-07 PROCEDURE — 96417 CHEMO IV INFUS EACH ADDL SEQ: CPT

## 2020-08-07 PROCEDURE — 96375 TX/PRO/DX INJ NEW DRUG ADDON: CPT

## 2020-08-07 PROCEDURE — 96411 CHEMO IV PUSH ADDL DRUG: CPT

## 2020-08-07 PROCEDURE — 96413 CHEMO IV INFUSION 1 HR: CPT

## 2020-08-07 PROCEDURE — 85025 COMPLETE CBC W/AUTO DIFF WBC: CPT | Performed by: INTERNAL MEDICINE

## 2020-08-07 PROCEDURE — 25000128 H RX IP 250 OP 636: Mod: ZF | Performed by: NURSE PRACTITIONER

## 2020-08-07 PROCEDURE — 25000128 H RX IP 250 OP 636: Mod: ZF | Performed by: INTERNAL MEDICINE

## 2020-08-07 RX ORDER — HEPARIN SODIUM (PORCINE) LOCK FLUSH IV SOLN 100 UNIT/ML 100 UNIT/ML
5 SOLUTION INTRAVENOUS
Status: DISCONTINUED | OUTPATIENT
Start: 2020-08-07 | End: 2020-08-07 | Stop reason: HOSPADM

## 2020-08-07 RX ORDER — HEPARIN SODIUM (PORCINE) LOCK FLUSH IV SOLN 100 UNIT/ML 100 UNIT/ML
5 SOLUTION INTRAVENOUS EVERY 8 HOURS
Status: DISCONTINUED | OUTPATIENT
Start: 2020-08-07 | End: 2020-08-07 | Stop reason: HOSPADM

## 2020-08-07 RX ADMIN — Medication 5 ML: at 14:45

## 2020-08-07 RX ADMIN — PERTUZUMAB 420 MG: 30 INJECTION, SOLUTION, CONCENTRATE INTRAVENOUS at 11:25

## 2020-08-07 RX ADMIN — SODIUM CHLORIDE 250 ML: 9 INJECTION, SOLUTION INTRAVENOUS at 10:37

## 2020-08-07 RX ADMIN — Medication 5 ML: at 09:47

## 2020-08-07 RX ADMIN — DEXAMETHASONE SODIUM PHOSPHATE: 10 INJECTION, SOLUTION INTRAMUSCULAR; INTRAVENOUS at 10:37

## 2020-08-07 RX ADMIN — CARBOPLATIN 750 MG: 10 INJECTION, SOLUTION INTRAVENOUS at 13:52

## 2020-08-07 RX ADMIN — DOCETAXEL 100 MG: 20 INJECTION, SOLUTION, CONCENTRATE INTRAVENOUS at 12:41

## 2020-08-07 RX ADMIN — TRASTUZUMAB-ANNS 272 MG: 420 INJECTION, POWDER, LYOPHILIZED, FOR SOLUTION INTRAVENOUS at 12:05

## 2020-08-07 ASSESSMENT — PAIN SCALES - GENERAL: PAINLEVEL: NO PAIN (0)

## 2020-08-07 NOTE — PATIENT INSTRUCTIONS
[facility] Triage and after hours / weekends / holidays:  [phone]    Please call the triage or after hours line if you experience a temperature greater than or equal to 100.5, shaking chills, have uncontrolled nausea, vomiting and/or diarrhea, dizziness, shortness of breath, chest pain, bleeding, unexplained bruising, or if you have any other new/concerning symptoms, questions or concerns.      If you are having any concerning symptoms or wish to speak to a provider before your next infusion visit, please call your care coordinator or triage to notify them so we can adequately serve you.     If you need a refill on a narcotic prescription or other medication, please call before your infusion appointment.                 August 2020 Sunday Monday Tuesday Wednesday Thursday Friday Saturday                                 1       2     3     4     5     6    VIDEO VISIT RETURN  10:05 AM   (50 min.)   Camila Spicer APRN CNP   [facility] 7    [facility] DRAW   9:15 AM   (15 min.)    [facility]   [facility] Draw    [facility] ON[facility] INFUSION 360  10:00 AM   (360 min.)    ONCOLOGY INFUSION   [facility]    VIDEO VISIT NEW  10:45 AM   (60 min.)   Mariana Resendiz PT   [facility] 8       9     10     11     12     13     14     15       16     17     18     19     20     21     22       23     24     25     26     27    VIDEO VISIT RETURN  10:05 AM   (50 min.)   Camila Spicer APRN CNP   [facility] 28    [facility] DRAW   8:15 AM   (15 min.)    [facility] DRAW   [facility] Lab Draw    [facility] ON[facility] INFUSION 360   9:00 AM   (360 min.)   [facility] ONCOLOGY INFUSION   [facility] 29 30 31 September 2020 Sunday Monday Tuesday Wednesday Thursday Friday Saturday             1     2     3     4     5       6     7     8     9     10     11     12       13      14     15     16     17    VIDEO VISIT RETURN   9:20 AM   (40 min.)   Kimberlee Johnson DO   [facility] 18     19       20     21     22     23     24     25     26       27     28     29     30                                   Recent Results (from the past 24 hour(s))   CBC with platelets differential    Collection Time: 08/07/20  9:48 AM   Result Value Ref Range    WBC 9.1 4.0 - 11.0 10e9/L    RBC Count 3.39 (L) 3.8 - 5.2 10e12/L    Hemoglobin 12.7 11.7 - 15.7 g/dL    Hematocrit 36.2 35.0 - 47.0 %     (H) 78 - 100 fl    MCH 37.5 (H) 26.5 - 33.0 pg    MCHC 35.1 31.5 - 36.5 g/dL    RDW 13.2 10.0 - 15.0 %    Platelet Count 111 (L) 150 - 450 10e9/L    Diff Method Automated Method     % Neutrophils 72.0 %    % Lymphocytes 21.5 %    % Monocytes 4.1 %    % Eosinophils 1.8 %    % Basophils 0.4 %    % Immature Granulocytes 0.2 %    Nucleated RBCs 0 0 /100    Absolute Neutrophil 6.5 1.6 - 8.3 10e9/L    Absolute Lymphocytes 2.0 0.8 - 5.3 10e9/L    Absolute Monocytes 0.4 0.0 - 1.3 10e9/L    Absolute Eosinophils 0.2 0.0 - 0.7 10e9/L    Absolute Basophils 0.0 0.0 - 0.2 10e9/L    Abs Immature Granulocytes 0.0 0 - 0.4 10e9/L    Absolute Nucleated RBC 0.0    Comprehensive metabolic panel    Collection Time: 08/07/20  9:48 AM   Result Value Ref Range    Sodium 140 133 - 144 mmol/L    Potassium 3.9 3.4 - 5.3 mmol/L    Chloride 107 94 - 109 mmol/L    Carbon Dioxide 29 20 - 32 mmol/L    Anion Gap 4 3 - 14 mmol/L    Glucose 101 (H) 70 - 99 mg/dL    Urea Nitrogen 7 7 - 30 mg/dL    Creatinine 0.56 0.52 - 1.04 mg/dL    GFR Estimate >90 >60 mL/min/[1.73_m2]    GFR Estimate If Black >90 >60 mL/min/[1.73_m2]    Calcium 8.6 8.5 - 10.1 mg/dL    Bilirubin Total 0.4 0.2 - 1.3 mg/dL    Albumin 3.6 3.4 - 5.0 g/dL    Protein Total 7.1 6.8 - 8.8 g/dL    Alkaline Phosphatase 70 40 - 150 U/L    ALT 25 0 - 50 U/L    AST 12 0 - 45 U/L

## 2020-08-07 NOTE — PROGRESS NOTES
"   08/07/20 1100   Quick Adds   Type of Visit Initial OP PT Evaluation  (Video visit completed. )   General Information   Start of Care Date 08/07/20   Referring Physician Lucita Gerardo MD    Orders Evaluate and Treat as Indicated   Order Date 07/15/20   Medical Diagnosis breast cancer    Onset of illness/injury or Date of Surgery 08/07/20   Surgical/Medical history reviewed Yes   Pertinent history of current problem (include personal factors and/or comorbidities that impact the POC) Pt diagnosed with R breast cancer 6/2020 - invasive ductal carcinoma. Hx of tobacco use (quit recently), 3-4 drinks per day. Current chemo is taxol weekly x 12 weeks. Is currently in 2nd infusion. The first one was really bad. Lost a lot of weight. I made a point to walk to the end of the driveway or in the house. Most days are really bad. Trying to get up to move around. Before this, I was very active: swimming etc. Repots no changes in balance. Taking my time more. Hard time with stairs for bad days. Daily activites are a lot of work.    Pertinent Visual History  n/a   Current Community Support Family/friend caregiver  (kids and  )   Patient role/Employment history Family caregiver  (Mom and wife )   Living environment House/[facility]   Home/Community Accessibility Comments flight of stairs to bedroom, with railing.    ADL Devices Shower/Tub Chair   General Information Comments 1st week = bad, 2nd week = turning a corner, 3rd week = feels like herself.  November/December for double masectomy    Fall Risk Screen   Fall screen completed by PT   Have you fallen 2 or more times in the past year? No   Have you fallen and had an injury in the past year? No   Is patient a fall risk? No   System Outcome Measures   Outcome Measures Cancer Rehab   FACIT Fatigue Subscale (score out of 52). The higher the score, the better the QOL. 40  (\"good week\" )   Pain   Pain comments Decreased pain from R breast. Pain from hair loss -    Cognitive " Status Examination   Orientation orientation to person, place and time   Level of Consciousness alert   Follows Commands and Answers Questions 100% of the time   Personal Safety and Judgment intact   Memory impaired   Cognitive Comment Definitely noticed chemo brain - fogginess, memory loss   Integumentary   Integumentary Comments Port placement    Range of Motion (ROM)   ROM Comment WNLs    Strength   Strength Comments WNLs   Bed Mobility   Bed Mobility Comments Reports independence    Transfer Skills   Transfer Comments independent w/sit<>stand transfer w/no UE use    Gait   Gait Comments Pt reports normal ambulation distance and mechanics.    Balance Special Tests   Balance Special Tests Sit to stand reps;Single leg stance right;Single leg stance left   Balance Special Tests Single Leg Stance Right,   Right, seconds 30 Seconds   Comments Reports more difficult than it was before.    Balance Special Tests Single Leg Stance Left   Left, seconds 30 Seconds   Comments Reports more difficult than it was before.    Balance Special Tests Sit to Stand Reps in 30 Seconds   Reps in 30 seconds 16   Height 18 inch    Comments No UE use    Sensory Examination   Sensory Perception Comments some tingling/numbness a few days after first chemo, but went away. May come back after this time.    Planned Therapy Interventions   Planned Therapy Interventions balance training;neuromuscular re-education;ROM;stretching;strengthening   Clinical Impression   Criteria for Skilled Therapeutic Interventions Met yes, treatment indicated   PT Diagnosis deconditioning s/p breast cancer and treatment    Influenced by the following impairments occasional neuropathy, weakness, fatigue, dec activity tolerance, mild imbalance    Functional limitations due to impairments daily activities, community mobility/ambulation   Clinical Presentation Stable/Uncomplicated   Clinical Decision Making (Complexity) Low complexity   Therapy Frequency other (see  comments)  (Every other week )   Predicted Duration of Therapy Intervention (days/wks) up to 90 days    Risk & Benefits of therapy have been explained Yes   Patient, Family & other staff in agreement with plan of care Yes   Clinical Impression Comments Pt with recent diagnosis of breast cancer has begun her second infusion and reports increased fatigue and weakness. She will benefit from skilled PT services in order to improve strength, fatigue, balance, activity tolerance and learn energy conservation techniques to improve quaily of life and functional mobility    GOALS   PT Eval Goals 1;2;3   Goal 1   Goal Identifier FACIT    Goal Description Pt will score 46 or greater on FACIT in order to demo improved fatigue to increase tolerance for daily activities    Target Date 11/04/20   Goal 2   Goal Identifier STS    Goal Description Pt will perform 20 STS in 30 seconds in order to demo improved functional LE strengthening.    Target Date 11/04/20   Goal 3   Goal Identifier HEP    Goal Description Pt will be independent in HEP exercises to improve strength, balance and fatigue and verbalize energy conservation techiques for weeks she may not be feeling well.    Target Date 11/04/20   Total Evaluation Time   PT Eval, Low Complexity Minutes (28519) 31

## 2020-08-07 NOTE — NURSING NOTE
Chief Complaint   Patient presents with     Port Draw     power needle. heparin locked, vitals hecked     Jazmyn Peace RN on 8/7/2020 at 9:50 AM

## 2020-08-07 NOTE — PROGRESS NOTES
Infusion Nursing Note:  Leah Rangel presents today for Cycle 2 Day 1 Perjeta, Herceptin, Taxotere, Carboplatin.    Patient seen by provider today: Yes: Camila Spicer NP on 8/6   present during visit today: Not Applicable.    Note: Patient reports she is feeling OK, she denies any changes since her office visit with Camila yesterday.    Intravenous Access:  Implanted Port.    Treatment Conditions:  Lab Results   Component Value Date    HGB 12.7 08/07/2020     Lab Results   Component Value Date    WBC 9.1 08/07/2020      Lab Results   Component Value Date    ANEU 6.5 08/07/2020     Lab Results   Component Value Date     08/07/2020      Lab Results   Component Value Date     08/07/2020                   Lab Results   Component Value Date    POTASSIUM 3.9 08/07/2020       Lab Results   Component Value Date    CR 0.56 08/07/2020                   Lab Results   Component Value Date    URVASHI 8.6 08/07/2020                Lab Results   Component Value Date    BILITOTAL 0.4 08/07/2020           Lab Results   Component Value Date    ALBUMIN 3.6 08/07/2020                    Lab Results   Component Value Date    ALT 25 08/07/2020           Lab Results   Component Value Date    AST 12 08/07/2020       Results reviewed, labs MET treatment parameters, ok to proceed with treatment.      Post Infusion Assessment:  Patient tolerated infusion without incident.  Blood return noted pre and post infusion.  Site patent and intact, free from redness, edema or discomfort.  No evidence of extravasations.  Access discontinued per protocol.       Discharge Plan:   Prescription refills given for zofran, decadron.  Discharge instructions reviewed with: Patient.  Patient and/or family verbalized understanding of discharge instructions and all questions answered.  AVS to patient via [employer] interprises.  Patient will return 8/27 for next provider visit then 8/28 for next infusion appointment.   Patient discharged in stable  condition accompanied by: self.  Departure Mode: Ambulatory.    Jaleesa Guerrero RN

## 2020-08-07 NOTE — PROGRESS NOTES
Received work accomodation form from patient. Form filled out and placed in provider folder for approval and signature.    Joanna Carrera CMA ([facility])

## 2020-08-21 ENCOUNTER — TELEPHONE (OUTPATIENT)
Dept: ONCOLOGY | Facility: CLINIC | Age: 40
End: 2020-08-21

## 2020-08-21 NOTE — LETTER
Cancer Risk Management  Program [facility]  [facility]  Mailing Address  Cancer Risk Management Program  [facility]  [address]  [place] [address]    New patient appointments  [phone]  August 24, 2020    Leah Rangel  [address] [place] [address]      Dear Leah,    It was a pleasure speaking with you on the phone on 8/21/2020. Here is a copy of the progress note from our discussion. If you have any additional questions, please feel free to call.    Referring Provider: Lucita Gerardo MD    Presenting Information:  I spoke to Leah by phone to discuss her genetic testing results for the Breast Actionable Panel. Her blood was drawn on 7/13/20.  The Breast Actionable Panel was ordered from the Molecular Diagnostics Laboratory at [facility]. This testing was done because of her personal and family history of breast cancer.    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 Given the uncertain significance of this result, medical management decisions should NOT be made based on this test result alone.    Of note, Leah tested negative for mutations or variants of uncertain significance in the following genes by sequencing and deletion/duplication analysis: BRCA1, BRCA2, CDH1, CHEK2, NBN, NF1, PALB2, PTEN, STK11, TP53. We reviewed the autosomal dominant inheritance of these genes. Leah cannot pass on a mutation in any of these genes to her children based on this test result. Mutations in these genes do not skip generations.      Interpretation:  We discussed several different interpretations of this inconclusive test  result. It is not clear if this variant in the JOSS gene is associated with increased cancer risk.  1. This variant may be a benign change that does not increase cancer risk.  2. This variant may be a harmful mutation that causes and increased cancer risk.    We discussed that known pathogenic (harmful) mutations in the JOSS gene are associated with a increased risk for breast, pancreatic, and possibly prostate cancer. We reviewed that JOSS is a moderate-risk breast cancer gene. The risk for breast cancer in women with an JOSS mutation is estimated to be approximately 24-48%. We also discussed the association of JOSS mutations with increased risk for pancreatic and prostate cancer; however data is still limited in this area. In rare situations in which both parents have a harmful mutation in the JOSS gene, their children each have a 25% risk for ataxia-telangiectasia. Ataxia-telangiectasia is a rare autosomal recessive disorder that typically presents in childhood and can present with widened blood vessels called telangiectasias, progressive neurologic symptoms, immune deficiency, and increased risk for childhood cancers. If this variant is later classified as harmful, Leah would be considered a carrier for ataxia-telangiectasia. In that case, genetic counseling and genetic testing may be advised for her partner.    The laboratory is working to determine if this variant is harmful or benign, and they will contact me if it is reclassified. If this variant is determined to be a benign change, there may be a different gene or combination of genes and environment that are associated with the cancers in this family.    It is also important to consider that her relatives may have a mutation in one of the genes tested and she did not inherit it.      Inheritance:  We reviewed the autosomal dominant inheritance of this variant in the JOSS gene. We discussed that Leah has a 50% chance to pass this variant to each of her  children. Likewise, each of her siblings has a 50% risk of having the same variant. Because it is unclear what, if any, risk is associated with this variant, clinical genetic testing for this JOSS variant alone is not recommended for relatives.    Screening:  Leah has opted to proceed with additional genetic testing via the Hereditary Cancer Comprehensive 84 gene panel, which is currently pending. Complete screening recommendations for Leah and her family will be reviewed when all of her genetic testing information is available.     Plan:  1.  Leah will be mailed a copy of her test results.    2.  She plans to follow-up with her physicians.  3.  She will be contacted again when her additional genetic test results are available for review.  4.  I will contact Leah if the laboratory informs me that this VUS has been reclassified.  This may change screening and testing recommendations for Leah and her relatives.    If Leah has any further questions, I encouraged her to contact me at [phone].    Ayah Pratt MS, [facility]  Licensed Genetic Counselor  Office: [phone]

## 2020-08-21 NOTE — TELEPHONE ENCOUNTER
"8/21/2020    Referring Provider: Lucita Gerardo MD    Presenting Information:  I spoke to Leah by phone to discuss her genetic testing results for the Breast Actionable Panel. Her blood was drawn on 7/13/20.  The Breast Actionable Panel was ordered from the Molecular Diagnostics Laboratory at [facility]. This testing was done because of her personal and family history of breast cancer.    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 Given the uncertain significance of this result, medical management decisions should NOT be made based on this test result alone.    Of note, Leah tested negative for mutations or variants of uncertain significance in the following genes by sequencing and deletion/duplication analysis: BRCA1, BRCA2, CDH1, CHEK2, NBN, NF1, PALB2, PTEN, STK11, TP53. We reviewed the autosomal dominant inheritance of these genes. Leah cannot pass on a mutation in any of these genes to her children based on this test result. Mutations in these genes do not skip generations.      A copy of the test report can be found in the Laboratory tab, dated 7/21/20, and named \"Hereditary cancer Breast Actionable\".     Interpretation:  We discussed several different interpretations of this inconclusive test result. It is not clear if this variant in the JOSS gene is associated with increased cancer risk.  1. This variant may be a benign change that does not increase cancer risk.  2. This variant may be a harmful mutation that causes and increased cancer risk.    We discussed that known pathogenic (harmful) mutations in the JOSS gene are associated with a increased risk for breast, pancreatic, and possibly prostate cancer. We reviewed that JOSS is a moderate-risk breast cancer gene. The risk for breast cancer in women with an JOSS mutation is estimated " to be approximately 24-48%. We also discussed the association of JOSS mutations with increased risk for pancreatic and prostate cancer; however data is still limited in this area. In rare situations in which both parents have a harmful mutation in the JOSS gene, their children each have a 25% risk for ataxia-telangiectasia. Ataxia-telangiectasia is a rare autosomal recessive disorder that typically presents in childhood and can present with widened blood vessels called telangiectasias, progressive neurologic symptoms, immune deficiency, and increased risk for childhood cancers. If this variant is later classified as harmful, Leah would be considered a carrier for ataxia-telangiectasia. In that case, genetic counseling and genetic testing may be advised for her partner.    The laboratory is working to determine if this variant is harmful or benign, and they will contact me if it is reclassified. If this variant is determined to be a benign change, there may be a different gene or combination of genes and environment that are associated with the cancers in this family.    It is also important to consider that her relatives may have a mutation in one of the genes tested and she did not inherit it.      Inheritance:  We reviewed the autosomal dominant inheritance of this variant in the JOSS gene. We discussed that Leah has a 50% chance to pass this variant to each of her children. Likewise, each of her siblings has a 50% risk of having the same variant. Because it is unclear what, if any, risk is associated with this variant, clinical genetic testing for this JOSS variant alone is not recommended for relatives.    Screening:  Leah has opted to proceed with additional genetic testing via the Hereditary Cancer Comprehensive 84 gene panel, which is currently pending. Complete screening recommendations for Leah and her family will be reviewed when all of her genetic testing information is available.     Plan:  1.   Leah will be mailed a copy of her test results.    2.  She plans to follow-up with her physicians.  3.  She will be contacted again when her additional genetic test results are available for review.  4.  I will contact Leah if the laboratory informs me that this VUS has been reclassified.  This may change screening and testing recommendations for Leah and her relatives.    If Leah has any further questions, I encouraged her to contact me at [phone].    Time spent over the phone: 15 minutes    Ayah Pratt MS, [facility]  Licensed Genetic Counselor  Office: [phone]

## 2020-08-21 NOTE — Clinical Note
Please send copy of letter to patient with test results. Please enclose test results: Hereditary cancer Breast Actionable [[id number]] (Order [id number])

## 2020-08-25 ENCOUNTER — TELEPHONE (OUTPATIENT)
Dept: PALLIATIVE CARE | Facility: CLINIC | Age: 40
End: 2020-08-25

## 2020-08-25 NOTE — TELEPHONE ENCOUNTER
Call made to Leah as she has been unable to tolerate the newly prescribed Morphine (prescribed 7/23 by Dr Hook)     Leah described feeling  that when taking the Morphine she feels  absent or distant and at times her mood changes to a feeling of elation.    She has stopped taking the Morphine and only takes Tylenol, when needed,    Leah is concerned that her symptoms including pain will intensify after chemotherapy 08/28 and she asks for alternatives to opioids that might help. Leah also requests that she is referred to the medical cannabis program.    I will forward this information onto Dr Husain for next steps

## 2020-08-25 NOTE — TELEPHONE ENCOUNTER
Dr Hook will refer Leah to the medical cannabis program - she confirms her correct email is on [email or web address]     Leah confirms that she address her vaginal pain with her Gyn/Onc team and has been given interventions that have reduced her pain.    She will continue to use Tylenol for pain, Morphine if pain acute and will call if further assistance is required.

## 2020-08-27 ENCOUNTER — VIRTUAL VISIT (OUTPATIENT)
Dept: ONCOLOGY | Facility: CLINIC | Age: 40
End: 2020-08-27
Attending: NURSE PRACTITIONER
Payer: COMMERCIAL

## 2020-08-27 DIAGNOSIS — Z17.0 MALIGNANT NEOPLASM OF UPPER-INNER QUADRANT OF RIGHT BREAST IN FEMALE, ESTROGEN RECEPTOR POSITIVE (H): Primary | ICD-10-CM

## 2020-08-27 DIAGNOSIS — C50.211 MALIGNANT NEOPLASM OF UPPER-INNER QUADRANT OF RIGHT BREAST IN FEMALE, ESTROGEN RECEPTOR POSITIVE (H): Primary | ICD-10-CM

## 2020-08-27 PROCEDURE — 99214 OFFICE O/P EST MOD 30 MIN: CPT | Mod: 95 | Performed by: NURSE PRACTITIONER

## 2020-08-27 PROCEDURE — 96417 CHEMO IV INFUS EACH ADDL SEQ: CPT

## 2020-08-27 PROCEDURE — 40001009 ZZH VIDEO/TELEPHONE VISIT; NO CHARGE

## 2020-08-27 PROCEDURE — 96375 TX/PRO/DX INJ NEW DRUG ADDON: CPT

## 2020-08-27 PROCEDURE — 96413 CHEMO IV INFUSION 1 HR: CPT

## 2020-08-27 PROCEDURE — G0463 HOSPITAL OUTPT CLINIC VISIT: HCPCS | Mod: GT,ZF

## 2020-08-27 RX ORDER — METHYLPREDNISOLONE SODIUM SUCCINATE 125 MG/2ML
125 INJECTION, POWDER, LYOPHILIZED, FOR SOLUTION INTRAMUSCULAR; INTRAVENOUS
Status: CANCELLED
Start: 2020-08-28

## 2020-08-27 RX ORDER — HEPARIN SODIUM (PORCINE) LOCK FLUSH IV SOLN 100 UNIT/ML 100 UNIT/ML
5 SOLUTION INTRAVENOUS
Status: CANCELLED | OUTPATIENT
Start: 2020-08-28

## 2020-08-27 RX ORDER — ACETAMINOPHEN 325 MG/1
650 TABLET ORAL
Status: CANCELLED
Start: 2020-08-28

## 2020-08-27 RX ORDER — ALBUTEROL SULFATE 0.83 MG/ML
2.5 SOLUTION RESPIRATORY (INHALATION)
Status: CANCELLED | OUTPATIENT
Start: 2020-08-28

## 2020-08-27 RX ORDER — NALOXONE HYDROCHLORIDE 0.4 MG/ML
.1-.4 INJECTION, SOLUTION INTRAMUSCULAR; INTRAVENOUS; SUBCUTANEOUS
Status: CANCELLED | OUTPATIENT
Start: 2020-08-28

## 2020-08-27 RX ORDER — SODIUM CHLORIDE 9 MG/ML
1000 INJECTION, SOLUTION INTRAVENOUS CONTINUOUS PRN
Status: CANCELLED
Start: 2020-08-28

## 2020-08-27 RX ORDER — DIPHENHYDRAMINE HCL 25 MG
50 CAPSULE ORAL
Status: CANCELLED | OUTPATIENT
Start: 2020-08-28

## 2020-08-27 RX ORDER — HEPARIN SODIUM,PORCINE 10 UNIT/ML
5 VIAL (ML) INTRAVENOUS
Status: CANCELLED | OUTPATIENT
Start: 2020-08-28

## 2020-08-27 RX ORDER — EPINEPHRINE 1 MG/ML
0.3 INJECTION, SOLUTION INTRAMUSCULAR; SUBCUTANEOUS EVERY 5 MIN PRN
Status: CANCELLED | OUTPATIENT
Start: 2020-08-28

## 2020-08-27 RX ORDER — ALBUTEROL SULFATE 90 UG/1
1-2 AEROSOL, METERED RESPIRATORY (INHALATION)
Status: CANCELLED
Start: 2020-08-28

## 2020-08-27 RX ORDER — LORAZEPAM 2 MG/ML
0.5 INJECTION INTRAMUSCULAR EVERY 4 HOURS PRN
Status: CANCELLED
Start: 2020-08-28

## 2020-08-27 RX ORDER — DIPHENHYDRAMINE HYDROCHLORIDE 50 MG/ML
50 INJECTION INTRAMUSCULAR; INTRAVENOUS
Status: CANCELLED
Start: 2020-08-28

## 2020-08-27 RX ORDER — MEPERIDINE HYDROCHLORIDE 25 MG/ML
25 INJECTION INTRAMUSCULAR; INTRAVENOUS; SUBCUTANEOUS EVERY 30 MIN PRN
Status: CANCELLED | OUTPATIENT
Start: 2020-08-28

## 2020-08-27 NOTE — PROGRESS NOTES
"Leah Rangel is a 40 year old female who is being evaluated via a billable video visit.      The patient has been notified of following:     \"This video visit will be conducted via a call between you and your physician/provider. We have found that certain health care needs can be provided without the need for an in-person physical exam.  This service lets us provide the care you need with a video conversation.  If a prescription is necessary we can send it directly to your pharmacy.  If lab work is needed we can place an order for that and you can then stop by our lab to have the test done at a later time.    Video visits are billed at different rates depending on your insurance coverage.  Please reach out to your insurance provider with any questions.    If during the course of the call the physician/provider feels a video visit is not appropriate, you will not be charged for this service.\"    Patient has given verbal consent for Video visit? Yes  How would you like to obtain your AVS? MyChart  If you are dropped from the video visit, the video invite should be resent to: Text to cell phone: [phone]   Will anyone else be joining your video visit? No       Vitals - Patient Reported  Weight (Patient Reported): 50.3 kg (111 lb)  Height (Patient Reported): 162.6 cm (5' 4\")  BMI (Based on Pt Reported Ht/Wt): 19.05  Pain Score: No Pain (0)      I have reviewed and updated the patient's allergies and medication list.    Concerns: No concerns  Refills: Ondansetron 8 mg, Lorazepam 0.5 mg, Dexamethasone 4 mg      Joanna Carrera CMA          Video-Visit Details    Type of service:  Video Visit    Video Start Time: 11:32 AM  Video End Time: 12:12 PM    Originating Location (pt. Location): Home    Distant Location (provider location):  [facility]     Platform used for Video Visit: ADAMA Pruitt CNP/Camila Spicer CNP            Reason for Visit: F/U for invasive ductal " "carcinoma of R breast, grade 3, ER positive, MD positive, HER-2 positive, 3+ by IHC    Oncology HPI:   Leah Rangel is a  40 year old, premenopausal female, diagnosed with new R breast cancer.      Leah reports that she has a history of bilateral implants.  She noticed a palpable mass involving her right breast in the upper inner quadrant in early 06/2020.  At that time, she went in for an evaluation with a mammogram and ultrasound.  Mammogram on 06/11/2020 revealed a 7 mm mass at the 1 o'clock position approximately 5 cm from the nipple in the upper inner quadrant of the right breast.  There was a second area measuring 6 x 5 x 4 mm at the 1 o'clock position 6 cm from the nipple.  In the left breast, there was an area of microcalcifications.  She was referred for a right breast biopsy.  Both areas at the 1 o'clock position were biopsied.  No lymph nodes were appreciated.  Both of these biopsies revealed an invasive ductal carcinoma, grade 3, ER positive, MD positive, HER-2 positive, 3+ by IHC.  She met with a surgeon, a plastic surgeon as well as a medical oncologist, Dr. Adelso Balderas.  She was discussed having neoadjuvant chemotherapy followed by bilateral mastectomy and reconstruction.  She is anxious to get started with therapy.  She has been discussing getting a second opinion and as a result comes to see me.  At the time of evaluation by Dr. Adelso Balderas, she was felt to have a palpable right axillary lymph node measuring 1.5 cm and was considered to have a T1b multifocal N1 breast cancer.      On evaluation, her imaging is as described above.  She was subsequently referred for a breast MRI.  The BMRI done on 7/10/20 showed a 4.5 x 2.4 x 3 cm mass in the R upper, inner breast. She underwent a PET/CT 7/16 and started neoadjuvant treatment with Docetaxel/Carboplatin/Trastuzumab/Pertuzumab (TCHP) 7/1720.     Interval history:   -Pt reports she is \"doing a lot better\". Gets chemo on Fridays, feels good days 1-4, " feels like crap Monday-Sunday (Day 4-10). Now is doing much better. Has gone back to working remote. Mild fatigue. Maintaining wt, back up to 111 lb.    -Tumor site pain is better. Uses Tylenol PM for pain and sleep. Reports she hates morphine, does not want to take it. Got a medical marijuana script and is picking up today, will try for tumor site pain.    -Reports diarrhea since first cycle of chemo. Reports she is taking 2 imodium then another after each episode. Reports 3-6 BM per day. Is improving. Reports it is not overly bothersome to her. Significantly improved from cycle 1    -Reports vaginal irritation is resolved.     -Nausea post chemo is bad days 3-10 but then improves. Reports during this time nausea is 8-9/10. Takes zofran first thing in morning then takes 2 times throughout the day. Reports it does enough to get her through the day. Has not tried compazine.    -Reports sleep is okay. Mind not racing at night. Taking tyelnol PM and this is helping. Takes ativan if she needs it- not daily.     -Reports mood has been good, getting stronger, more independent. Wants to do mind over matter and not pills in regards to mood.    -Reports new HA over past week. Reports they occur in morning and night- not worse while laying down. Reports they are very mild. Feels like a pressure. She attributes them to hair falling out and lifestyle change with drinking less water/stress with resuming work. Tylenol is helpful for pain. Denies nausea, vomiting, and double vision. Does reports some blurred vision, has not had eyes checked in quite some time.    -Reports breast mass is significantly decreased in size and hard to find now    -Denies fever, chills, chest pain, dyspnea, abdominal pain, vomiting, and constipation      Past Medical History:   Diagnosis Date     Breast cancer (H)      Complication of anesthesia      PONV (postoperative nausea and vomiting)         Current Outpatient Medications   Medication Sig  Dispense Refill     dexamethasone (DECADRON) 4 MG tablet Take 2 tablets (8 mg) by mouth 2 times daily Evening AFTER Docetaxel and continue for 4 additional doses. 10 tablet 5     diazepam (VALIUM) 5 MG tablet Take one tablet one hour prior to scan. May repeat dose one time. 2 tablet 0     lidocaine (XYLOCAINE) 2 % external gel Apply topically 3 times daily as needed for moderate pain 30 mL 1     lidocaine-prilocaine (EMLA) 2.5-2.5 % external cream Apply topically as needed for moderate pain Apply to PORT site 45 minutes prior to procedure.  Cover with a non-absorbent dressing. 30 g 4     LORazepam (ATIVAN) 0.5 MG tablet Take 1 tablet (0.5 mg) by mouth every 4 hours as needed (Anxiety, Nausea/Vomiting or Sleep) 30 tablet 5     magic mouthwash suspension, diphenhydrAMINE, lidocaine, aluminum-magnesium & simethicone, (FIRST-MOUTHWASH BLM) compounding kit Swish and swallow 5-10 mLs in mouth every 6 hours as needed for mouth sores 237 mL 1     methylphenidate (RITALIN) 5 MG tablet Take 0.5-1 tablets (2.5-5 mg) by mouth 2 times daily as needed (fatigue) Take no later than 2pm 60 tablet 0     morphine (MSIR) 15 MG IR tablet Take 0.5-1 tablets (7.5-15 mg) by mouth every 4 hours as needed for severe pain 45 tablet 0     ondansetron (ZOFRAN) 8 MG tablet Take 1 tablet (8 mg) by mouth every 8 hours as needed (Nausea/Vomiting) 30 tablet 3     order for DME Equipment: Wig  Diagnosis: cranial alopecia 1 each 0     prochlorperazine (COMPAZINE) 10 MG tablet Take 1 tablet (10 mg) by mouth every 6 hours as needed (Nausea/Vomiting) 30 tablet 5     Vaginal Lubricant (REPLENS) GEL Apply vaginally as needed 35 g 1          Allergies   Allergen Reactions     Codeine Other (See Comments)     Got hyper       Exam:  There were no vitals taken for this visit.  Wt Readings from Last 4 Encounters:   08/07/20 47.5 kg (104 lb 12.8 oz)   07/23/20 44.5 kg (98 lb)   07/17/20 46.7 kg (102 lb 13.6 oz)   07/15/20 45.8 kg (101 lb)     -Constitutional:  well appearing without acute distress   -Integumentary: color appropriate for ethnicity. visualized skin is intact without rashes, petechiae, or bruising  -Respiratory: Normal breathing effort. Equal chest rise and fall   -Neurological: Alert and Oriented *3. Follow commands.   -MSK: moves extremities without difficulty   - Psychiatric: Patient is cooperative, pleasant, and alert. Appropriate affect. Demonstrates understanding of health choices and their consequences.     Labs:   Results for CORNELIO ARCOS (MRN [id number]) as of 8/27/2020 08:20   Ref. Range 8/7/2020 09:48   Sodium Latest Ref Range: 133 - 144 mmol/L 140   Potassium Latest Ref Range: 3.4 - 5.3 mmol/L 3.9   Chloride Latest Ref Range: 94 - 109 mmol/L 107   Carbon Dioxide Latest Ref Range: 20 - 32 mmol/L 29   Urea Nitrogen Latest Ref Range: 7 - 30 mg/dL 7   Creatinine Latest Ref Range: 0.52 - 1.04 mg/dL 0.56   GFR Estimate Latest Ref Range: >60 mL/min/1.73_m2 >90   GFR Estimate If Black Latest Ref Range: >60 mL/min/1.73_m2 >90   Calcium Latest Ref Range: 8.5 - 10.1 mg/dL 8.6   Anion Gap Latest Ref Range: 3 - 14 mmol/L 4   Albumin Latest Ref Range: 3.4 - 5.0 g/dL 3.6   Protein Total Latest Ref Range: 6.8 - 8.8 g/dL 7.1   Bilirubin Total Latest Ref Range: 0.2 - 1.3 mg/dL 0.4   Alkaline Phosphatase Latest Ref Range: 40 - 150 U/L 70   ALT Latest Ref Range: 0 - 50 U/L 25   AST Latest Ref Range: 0 - 45 U/L 12   Glucose Latest Ref Range: 70 - 99 mg/dL 101 (H)   WBC Latest Ref Range: 4.0 - 11.0 10e9/L 9.1   Hemoglobin Latest Ref Range: 11.7 - 15.7 g/dL 12.7   Hematocrit Latest Ref Range: 35.0 - 47.0 % 36.2   Platelet Count Latest Ref Range: 150 - 450 10e9/L 111 (L)   RBC Count Latest Ref Range: 3.8 - 5.2 10e12/L 3.39 (L)   MCV Latest Ref Range: 78 - 100 fl 107 (H)   MCH Latest Ref Range: 26.5 - 33.0 pg 37.5 (H)   MCHC Latest Ref Range: 31.5 - 36.5 g/dL 35.1   RDW Latest Ref Range: 10.0 - 15.0 % 13.2   Diff Method Unknown Automated Method   % Neutrophils  Latest Units: % 72.0   % Lymphocytes Latest Units: % 21.5   % Monocytes Latest Units: % 4.1   % Eosinophils Latest Units: % 1.8   % Basophils Latest Units: % 0.4   % Immature Granulocytes Latest Units: % 0.2   Nucleated RBCs Latest Ref Range: 0 /100 0   Absolute Neutrophil Latest Ref Range: 1.6 - 8.3 10e9/L 6.5   Absolute Lymphocytes Latest Ref Range: 0.8 - 5.3 10e9/L 2.0   Absolute Monocytes Latest Ref Range: 0.0 - 1.3 10e9/L 0.4   Absolute Eosinophils Latest Ref Range: 0.0 - 0.7 10e9/L 0.2   Absolute Basophils Latest Ref Range: 0.0 - 0.2 10e9/L 0.0   Abs Immature Granulocytes Latest Ref Range: 0 - 0.4 10e9/L 0.0   Absolute Nucleated RBC Unknown 0.0       Assessment/plan:   1.) ER/MO +, HER-2 positive R upper/inner quadrant breast cancer.   -Leah really struggled after her first chemotherapy. Her main side effects were diarrhea, fatigue, nausea, and mouth soreness; all contributing to poor nutrition and weight loss. These adverse effects have significantly improved with each round of treatment. She is feeling stronger and ready to proceed with infusion tomorrow. I agree it will be okay to proceed without dose reduction, pending labs.  -f/u with Dr. Gerardo prior to the next infusion     2.) Fatigue  -2/2 chemotherapy. Improves the second week after chemo. Encouraged her to continue daily activity.      3.) Pain  -The pain in her right breast has greatly decreased, along with the size of her tumor. Uses tylenol for pain relief- feels well managed on this.      4) Vulvovaginal pain  -Now resolved. Skin irritation 2/2 frequent diarrhea stooling     5.) Headaches   -Pt feels this is related to stress, hair loss, and possible low PO water intake. Will increase oral hydration and keep a HA log to monitor sx. Knows to call if HA worsens     6.) Anorexia  -Improved. Has regained wt she previously lost. Eating well. Continue to follow.      7.) Coping  -Pt reports she is feeling much stronger. No longer crying. Has a good  support system. She is very encouraged that her tumor is shrinking in size as it makes all these challenges from chemo toxicities worth it.      8.) Diarrhea  -Improved, down to 3-6 BM daily. Not bothersome to patient. Using imodium intermittently. Previously quite severe at 10-15 episodes per day. Continue to monitor.      9.) Nausea  -Improved, most notable days 4-10. Takes zofran 2-3 times per day on these days. Reports this makes it manageable. Does have compazine and ativan, has not tried them because she doesn't like to take lots of pills. Encouraged her to try compazine if needed      ADAMA Bonner CNP  8/27/2020    The patient was seen in conjunction with Yudi Temple CNP who served as a scribe for today's visit. I have reviewed the note and agree with the above findings and plan. TW

## 2020-08-27 NOTE — LETTER
"    8/27/2020         RE: Leah Rangel  [address] [place] [address]        Dear Colleague,    Thank you for referring your patient, Leah Rangel, to the [facility]. Please see a copy of my visit note below.    Leah Rangel is a 40 year old female who is being evaluated via a billable video visit.      The patient has been notified of following:     \"This video visit will be conducted via a call between you and your physician/provider. We have found that certain health care needs can be provided without the need for an in-person physical exam.  This service lets us provide the care you need with a video conversation.  If a prescription is necessary we can send it directly to your pharmacy.  If lab work is needed we can place an order for that and you can then stop by our lab to have the test done at a later time.    Video visits are billed at different rates depending on your insurance coverage.  Please reach out to your insurance provider with any questions.    If during the course of the call the physician/provider feels a video visit is not appropriate, you will not be charged for this service.\"    Patient has given verbal consent for Video visit? Yes  How would you like to obtain your AVS? MyChart  If you are dropped from the video visit, the video invite should be resent to: Text to cell phone: [phone]   Will anyone else be joining your video visit? No       Vitals - Patient Reported  Weight (Patient Reported): 50.3 kg (111 lb)  Height (Patient Reported): 162.6 cm (5' 4\")  BMI (Based on Pt Reported Ht/Wt): 19.05  Pain Score: No Pain (0)      I have reviewed and updated the patient's allergies and medication list.    Concerns: No concerns  Refills: Ondansetron 8 mg, Lorazepam 0.5 mg, Dexamethasone 4 mg      Joanna Carrera CMA          Video-Visit Details    Type of service:  Video Visit    Video Start Time: 11:32 AM  Video End Time: 12:12 PM    Originating Location " (pt. Location): Home    Distant Location (provider location):  [facility]     Platform used for Video Visit: ADAMA Pruitt CNP/Camila Spicer CNP            Reason for Visit: F/U for invasive ductal carcinoma of R breast, grade 3, ER positive, NH positive, HER-2 positive, 3+ by IHC    Oncology HPI:   Leah Rangel is a  40 year old, premenopausal female, diagnosed with new R breast cancer.      Leah reports that she has a history of bilateral implants.  She noticed a palpable mass involving her right breast in the upper inner quadrant in early 06/2020.  At that time, she went in for an evaluation with a mammogram and ultrasound.  Mammogram on 06/11/2020 revealed a 7 mm mass at the 1 o'clock position approximately 5 cm from the nipple in the upper inner quadrant of the right breast.  There was a second area measuring 6 x 5 x 4 mm at the 1 o'clock position 6 cm from the nipple.  In the left breast, there was an area of microcalcifications.  She was referred for a right breast biopsy.  Both areas at the 1 o'clock position were biopsied.  No lymph nodes were appreciated.  Both of these biopsies revealed an invasive ductal carcinoma, grade 3, ER positive, NH positive, HER-2 positive, 3+ by IHC.  She met with a surgeon, a plastic surgeon as well as a medical oncologist, Dr. Adelso Balderas.  She was discussed having neoadjuvant chemotherapy followed by bilateral mastectomy and reconstruction.  She is anxious to get started with therapy.  She has been discussing getting a second opinion and as a result comes to see me.  At the time of evaluation by Dr. Adelso Balderas, she was felt to have a palpable right axillary lymph node measuring 1.5 cm and was considered to have a T1b multifocal N1 breast cancer.      On evaluation, her imaging is as described above.  She was subsequently referred for a breast MRI.  The BMRI done on 7/10/20 showed a 4.5 x 2.4 x 3 cm mass in the R upper, inner  "breast. She underwent a PET/CT 7/16 and started neoadjuvant treatment with Docetaxel/Carboplatin/Trastuzumab/Pertuzumab (TCHP) 7/1720.     Interval history:   -Pt reports she is \"doing a lot better\". Gets chemo on Fridays, feels good days 1-4, feels like crap Monday-Sunday (Day 4-10). Now is doing much better. Has gone back to working remote. Mild fatigue. Maintaining wt, back up to 111 lb.    -Tumor site pain is better. Uses Tylenol PM for pain and sleep. Reports she hates morphine, does not want to take it. Got a medical marijuana script and is picking up today, will try for tumor site pain.    -Reports diarrhea since first cycle of chemo. Reports she is taking 2 imodium then another after each episode. Reports 3-6 BM per day. Is improving. Reports it is not overly bothersome to her. Significantly improved from cycle 1    -Reports vaginal irritation is resolved.     -Nausea post chemo is bad days 3-10 but then improves. Reports during this time nausea is 8-9/10. Takes zofran first thing in morning then takes 2 times throughout the day. Reports it does enough to get her through the day. Has not tried compazine.    -Reports sleep is okay. Mind not racing at night. Taking tyelnol PM and this is helping. Takes ativan if she needs it- not daily.     -Reports mood has been good, getting stronger, more independent. Wants to do mind over matter and not pills in regards to mood.    -Reports new HA over past week. Reports they occur in morning and night- not worse while laying down. Reports they are very mild. Feels like a pressure. She attributes them to hair falling out and lifestyle change with drinking less water/stress with resuming work. Tylenol is helpful for pain. Denies nausea, vomiting, and double vision. Does reports some blurred vision, has not had eyes checked in quite some time.    -Reports breast mass is significantly decreased in size and hard to find now    -Denies fever, chills, chest pain, dyspnea, " abdominal pain, vomiting, and constipation      Past Medical History:   Diagnosis Date     Breast cancer (H)      Complication of anesthesia      PONV (postoperative nausea and vomiting)         Current Outpatient Medications   Medication Sig Dispense Refill     dexamethasone (DECADRON) 4 MG tablet Take 2 tablets (8 mg) by mouth 2 times daily Evening AFTER Docetaxel and continue for 4 additional doses. 10 tablet 5     diazepam (VALIUM) 5 MG tablet Take one tablet one hour prior to scan. May repeat dose one time. 2 tablet 0     lidocaine (XYLOCAINE) 2 % external gel Apply topically 3 times daily as needed for moderate pain 30 mL 1     lidocaine-prilocaine (EMLA) 2.5-2.5 % external cream Apply topically as needed for moderate pain Apply to PORT site 45 minutes prior to procedure.  Cover with a non-absorbent dressing. 30 g 4     LORazepam (ATIVAN) 0.5 MG tablet Take 1 tablet (0.5 mg) by mouth every 4 hours as needed (Anxiety, Nausea/Vomiting or Sleep) 30 tablet 5     magic mouthwash suspension, diphenhydrAMINE, lidocaine, aluminum-magnesium & simethicone, (FIRST-MOUTHWASH BLM) compounding kit Swish and swallow 5-10 mLs in mouth every 6 hours as needed for mouth sores 237 mL 1     methylphenidate (RITALIN) 5 MG tablet Take 0.5-1 tablets (2.5-5 mg) by mouth 2 times daily as needed (fatigue) Take no later than 2pm 60 tablet 0     morphine (MSIR) 15 MG IR tablet Take 0.5-1 tablets (7.5-15 mg) by mouth every 4 hours as needed for severe pain 45 tablet 0     ondansetron (ZOFRAN) 8 MG tablet Take 1 tablet (8 mg) by mouth every 8 hours as needed (Nausea/Vomiting) 30 tablet 3     order for DME Equipment: Wig  Diagnosis: cranial alopecia 1 each 0     prochlorperazine (COMPAZINE) 10 MG tablet Take 1 tablet (10 mg) by mouth every 6 hours as needed (Nausea/Vomiting) 30 tablet 5     Vaginal Lubricant (REPLENS) GEL Apply vaginally as needed 35 g 1          Allergies   Allergen Reactions     Codeine Other (See Comments)     Got hyper        Exam:  There were no vitals taken for this visit.  Wt Readings from Last 4 Encounters:   08/07/20 47.5 kg (104 lb 12.8 oz)   07/23/20 44.5 kg (98 lb)   07/17/20 46.7 kg (102 lb 13.6 oz)   07/15/20 45.8 kg (101 lb)     -Constitutional: well appearing without acute distress   -Integumentary: color appropriate for ethnicity. visualized skin is intact without rashes, petechiae, or bruising  -Respiratory: Normal breathing effort. Equal chest rise and fall   -Neurological: Alert and Oriented *3. Follow commands.   -MSK: moves extremities without difficulty   - Psychiatric: Patient is cooperative, pleasant, and alert. Appropriate affect. Demonstrates understanding of health choices and their consequences.     Labs:   Results for CORNELIO ARCOS (MRN [id number]) as of 8/27/2020 08:20   Ref. Range 8/7/2020 09:48   Sodium Latest Ref Range: 133 - 144 mmol/L 140   Potassium Latest Ref Range: 3.4 - 5.3 mmol/L 3.9   Chloride Latest Ref Range: 94 - 109 mmol/L 107   Carbon Dioxide Latest Ref Range: 20 - 32 mmol/L 29   Urea Nitrogen Latest Ref Range: 7 - 30 mg/dL 7   Creatinine Latest Ref Range: 0.52 - 1.04 mg/dL 0.56   GFR Estimate Latest Ref Range: >60 mL/min/1.73_m2 >90   GFR Estimate If Black Latest Ref Range: >60 mL/min/1.73_m2 >90   Calcium Latest Ref Range: 8.5 - 10.1 mg/dL 8.6   Anion Gap Latest Ref Range: 3 - 14 mmol/L 4   Albumin Latest Ref Range: 3.4 - 5.0 g/dL 3.6   Protein Total Latest Ref Range: 6.8 - 8.8 g/dL 7.1   Bilirubin Total Latest Ref Range: 0.2 - 1.3 mg/dL 0.4   Alkaline Phosphatase Latest Ref Range: 40 - 150 U/L 70   ALT Latest Ref Range: 0 - 50 U/L 25   AST Latest Ref Range: 0 - 45 U/L 12   Glucose Latest Ref Range: 70 - 99 mg/dL 101 (H)   WBC Latest Ref Range: 4.0 - 11.0 10e9/L 9.1   Hemoglobin Latest Ref Range: 11.7 - 15.7 g/dL 12.7   Hematocrit Latest Ref Range: 35.0 - 47.0 % 36.2   Platelet Count Latest Ref Range: 150 - 450 10e9/L 111 (L)   RBC Count Latest Ref Range: 3.8 - 5.2 10e12/L 3.39 (L)    MCV Latest Ref Range: 78 - 100 fl 107 (H)   MCH Latest Ref Range: 26.5 - 33.0 pg 37.5 (H)   MCHC Latest Ref Range: 31.5 - 36.5 g/dL 35.1   RDW Latest Ref Range: 10.0 - 15.0 % 13.2   Diff Method Unknown Automated Method   % Neutrophils Latest Units: % 72.0   % Lymphocytes Latest Units: % 21.5   % Monocytes Latest Units: % 4.1   % Eosinophils Latest Units: % 1.8   % Basophils Latest Units: % 0.4   % Immature Granulocytes Latest Units: % 0.2   Nucleated RBCs Latest Ref Range: 0 /100 0   Absolute Neutrophil Latest Ref Range: 1.6 - 8.3 10e9/L 6.5   Absolute Lymphocytes Latest Ref Range: 0.8 - 5.3 10e9/L 2.0   Absolute Monocytes Latest Ref Range: 0.0 - 1.3 10e9/L 0.4   Absolute Eosinophils Latest Ref Range: 0.0 - 0.7 10e9/L 0.2   Absolute Basophils Latest Ref Range: 0.0 - 0.2 10e9/L 0.0   Abs Immature Granulocytes Latest Ref Range: 0 - 0.4 10e9/L 0.0   Absolute Nucleated RBC Unknown 0.0       Assessment/plan:   1.) ER/AZ +, HER-2 positive R upper/inner quadrant breast cancer.   -Leah really struggled after her first chemotherapy. Her main side effects were diarrhea, fatigue, nausea, and mouth soreness; all contributing to poor nutrition and weight loss. These adverse effects have significantly improved with each round of treatment. She is feeling stronger and ready to proceed with infusion tomorrow. I agree it will be okay to proceed without dose reduction, pending labs.  -f/u with Dr. Gerardo prior to the next infusion     2.) Fatigue  -2/2 chemotherapy. Improves the second week after chemo. Encouraged her to continue daily activity.      3.) Pain  -The pain in her right breast has greatly decreased, along with the size of her tumor. Uses tylenol for pain relief- feels well managed on this.      4) Vulvovaginal pain  -Now resolved. Skin irritation 2/2 frequent diarrhea stooling     5.) Headaches   -Pt feels this is related to stress, hair loss, and possible low PO water intake. Will increase oral hydration and keep a  HA log to monitor sx. Knows to call if HA worsens     6.) Anorexia  -Improved. Has regained wt she previously lost. Eating well. Continue to follow.      7.) Coping  -Pt reports she is feeling much stronger. No longer crying. Has a good support system. She is very encouraged that her tumor is shrinking in size as it makes all these challenges from chemo toxicities worth it.      8.) Diarrhea  -Improved, down to 3-6 BM daily. Not bothersome to patient. Using imodium intermittently. Previously quite severe at 10-15 episodes per day. Continue to monitor.      9.) Nausea  -Improved, most notable days 4-10. Takes zofran 2-3 times per day on these days. Reports this makes it manageable. Does have compazine and ativan, has not tried them because she doesn't like to take lots of pills. Encouraged her to try compazine if needed      ADAMA Bonner, CNP  8/27/2020    The patient was seen in conjunction with Yudi Temple CNP who served as a scribe for today's visit. I have reviewed the note and agree with the above findings and plan. TW      Again, thank you for allowing me to participate in the care of your patient.        Sincerely,        ADAMA Morris CNP

## 2020-08-28 ENCOUNTER — INFUSION THERAPY VISIT (OUTPATIENT)
Dept: ONCOLOGY | Facility: CLINIC | Age: 40
End: 2020-08-28
Attending: INTERNAL MEDICINE
Payer: COMMERCIAL

## 2020-08-28 VITALS
DIASTOLIC BLOOD PRESSURE: 83 MMHG | TEMPERATURE: 98 F | WEIGHT: 108 LBS | OXYGEN SATURATION: 97 % | HEART RATE: 91 BPM | BODY MASS INDEX: 18.54 KG/M2 | SYSTOLIC BLOOD PRESSURE: 120 MMHG | RESPIRATION RATE: 18 BRPM

## 2020-08-28 DIAGNOSIS — C50.211 MALIGNANT NEOPLASM OF UPPER-INNER QUADRANT OF RIGHT BREAST IN FEMALE, ESTROGEN RECEPTOR POSITIVE (H): Primary | ICD-10-CM

## 2020-08-28 DIAGNOSIS — C50.911 MALIGNANT NEOPLASM OF RIGHT FEMALE BREAST, UNSPECIFIED ESTROGEN RECEPTOR STATUS, UNSPECIFIED SITE OF BREAST (H): Primary | ICD-10-CM

## 2020-08-28 DIAGNOSIS — Z17.0 MALIGNANT NEOPLASM OF UPPER-INNER QUADRANT OF RIGHT BREAST IN FEMALE, ESTROGEN RECEPTOR POSITIVE (H): Primary | ICD-10-CM

## 2020-08-28 LAB
ALBUMIN SERPL-MCNC: 3.5 G/DL (ref 3.4–5)
ALP SERPL-CCNC: 70 U/L (ref 40–150)
ALT SERPL W P-5'-P-CCNC: 22 U/L (ref 0–50)
ANION GAP SERPL CALCULATED.3IONS-SCNC: 5 MMOL/L (ref 3–14)
AST SERPL W P-5'-P-CCNC: 10 U/L (ref 0–45)
BASOPHILS # BLD AUTO: 0.1 10E9/L (ref 0–0.2)
BASOPHILS NFR BLD AUTO: 1 %
BILIRUB SERPL-MCNC: 0.3 MG/DL (ref 0.2–1.3)
BUN SERPL-MCNC: 5 MG/DL (ref 7–30)
CALCIUM SERPL-MCNC: 8.6 MG/DL (ref 8.5–10.1)
CHLORIDE SERPL-SCNC: 108 MMOL/L (ref 94–109)
CO2 SERPL-SCNC: 28 MMOL/L (ref 20–32)
COPATH REPORT: NORMAL
CREAT SERPL-MCNC: 0.59 MG/DL (ref 0.52–1.04)
DIFFERENTIAL METHOD BLD: ABNORMAL
ERYTHROCYTE [DISTWIDTH] IN BLOOD BY AUTOMATED COUNT: 14.7 % (ref 10–15)
GFR SERPL CREATININE-BSD FRML MDRD: >90 ML/MIN/{1.73_M2}
GLUCOSE SERPL-MCNC: 116 MG/DL (ref 70–99)
HCT VFR BLD AUTO: 33.2 % (ref 35–47)
HGB BLD-MCNC: 11.5 G/DL (ref 11.7–15.7)
LYMPHOCYTES # BLD AUTO: 1.7 10E9/L (ref 0.8–5.3)
LYMPHOCYTES NFR BLD AUTO: 34 %
MCH RBC QN AUTO: 38.1 PG (ref 26.5–33)
MCHC RBC AUTO-ENTMCNC: 34.6 G/DL (ref 31.5–36.5)
MCV RBC AUTO: 110 FL (ref 78–100)
MONOCYTES # BLD AUTO: 0.3 10E9/L (ref 0–1.3)
MONOCYTES NFR BLD AUTO: 6 %
NEUTROPHILS # BLD AUTO: 3 10E9/L (ref 1.6–8.3)
NEUTROPHILS NFR BLD AUTO: 59 %
PLATELET # BLD AUTO: 140 10E9/L (ref 150–450)
POTASSIUM SERPL-SCNC: 3.9 MMOL/L (ref 3.4–5.3)
PROT SERPL-MCNC: 6.8 G/DL (ref 6.8–8.8)
RBC # BLD AUTO: 3.02 10E12/L (ref 3.8–5.2)
SODIUM SERPL-SCNC: 142 MMOL/L (ref 133–144)
WBC # BLD AUTO: 5.1 10E9/L (ref 4–11)

## 2020-08-28 PROCEDURE — 25000128 H RX IP 250 OP 636: Mod: ZF | Performed by: NURSE PRACTITIONER

## 2020-08-28 PROCEDURE — 25000132 ZZH RX MED GY IP 250 OP 250 PS 637: Mod: ZF | Performed by: NURSE PRACTITIONER

## 2020-08-28 PROCEDURE — 25000128 H RX IP 250 OP 636: Mod: ZF | Performed by: INTERNAL MEDICINE

## 2020-08-28 PROCEDURE — 85025 COMPLETE CBC W/AUTO DIFF WBC: CPT | Performed by: INTERNAL MEDICINE

## 2020-08-28 PROCEDURE — 25800030 ZZH RX IP 258 OP 636: Mod: ZF | Performed by: NURSE PRACTITIONER

## 2020-08-28 PROCEDURE — 80053 COMPREHEN METABOLIC PANEL: CPT | Performed by: INTERNAL MEDICINE

## 2020-08-28 RX ORDER — DIPHENHYDRAMINE HCL 25 MG
50 CAPSULE ORAL
Status: COMPLETED | OUTPATIENT
Start: 2020-08-28 | End: 2020-08-28

## 2020-08-28 RX ORDER — HEPARIN SODIUM (PORCINE) LOCK FLUSH IV SOLN 100 UNIT/ML 100 UNIT/ML
5 SOLUTION INTRAVENOUS EVERY 8 HOURS
Status: DISCONTINUED | OUTPATIENT
Start: 2020-08-28 | End: 2020-08-28 | Stop reason: HOSPADM

## 2020-08-28 RX ORDER — HEPARIN SODIUM (PORCINE) LOCK FLUSH IV SOLN 100 UNIT/ML 100 UNIT/ML
5 SOLUTION INTRAVENOUS
Status: DISCONTINUED | OUTPATIENT
Start: 2020-08-28 | End: 2020-08-28 | Stop reason: HOSPADM

## 2020-08-28 RX ADMIN — Medication 5 ML: at 08:49

## 2020-08-28 RX ADMIN — SODIUM CHLORIDE 250 ML: 9 INJECTION, SOLUTION INTRAVENOUS at 09:42

## 2020-08-28 RX ADMIN — DOCETAXEL 108 MG: 20 INJECTION, SOLUTION, CONCENTRATE INTRAVENOUS at 11:28

## 2020-08-28 RX ADMIN — TRASTUZUMAB-ANNS 272 MG: 420 INJECTION, POWDER, LYOPHILIZED, FOR SOLUTION INTRAVENOUS at 10:53

## 2020-08-28 RX ADMIN — CARBOPLATIN 750 MG: 10 INJECTION, SOLUTION INTRAVENOUS at 12:43

## 2020-08-28 RX ADMIN — Medication 5 ML: at 13:30

## 2020-08-28 RX ADMIN — DIPHENHYDRAMINE HYDROCHLORIDE 50 MG: 25 CAPSULE ORAL at 09:42

## 2020-08-28 RX ADMIN — PERTUZUMAB 420 MG: 30 INJECTION, SOLUTION, CONCENTRATE INTRAVENOUS at 10:17

## 2020-08-28 RX ADMIN — DEXAMETHASONE SODIUM PHOSPHATE: 10 INJECTION, SOLUTION INTRAMUSCULAR; INTRAVENOUS at 09:45

## 2020-08-28 ASSESSMENT — PAIN SCALES - GENERAL: PAINLEVEL: NO PAIN (0)

## 2020-08-28 NOTE — NURSING NOTE
Chief Complaint   Patient presents with     Port Draw     power needle. heparin locked, vitals checked     Jazmyn Peace RN on 8/28/2020 at 8:52 AM

## 2020-08-28 NOTE — PROGRESS NOTES
Infusion Nursing Note:  Leah Rangel presents today for Cycle 3 Day 1 Perjeta, Herceptin, Taxotere, Carboplatin .    Patient seen by provider today: Yes: Camila Spicer NP on 8/27   present during visit today: Not Applicable.    Note: Patient reports no changes since her provider visit yesterday. Does note a mild billateral rash on her legs that started a couple days ago after weeding outside. She feels like it's improving and not spreading. Requested benadryl po for the rash.     Intravenous Access:  Implanted Port.    Treatment Conditions:  Lab Results   Component Value Date    HGB 11.5 08/28/2020     Lab Results   Component Value Date    WBC 5.1 08/28/2020      Lab Results   Component Value Date    ANEU 3.0 08/28/2020     Lab Results   Component Value Date     08/28/2020      Lab Results   Component Value Date     08/28/2020                   Lab Results   Component Value Date    POTASSIUM 3.9 08/28/2020           No results found for: MAG         Lab Results   Component Value Date    CR 0.59 08/28/2020                   Lab Results   Component Value Date    URVASHI 8.6 08/28/2020                Lab Results   Component Value Date    BILITOTAL 0.3 08/28/2020           Lab Results   Component Value Date    ALBUMIN 3.5 08/28/2020                    Lab Results   Component Value Date    ALT 22 08/28/2020           Lab Results   Component Value Date    AST 10 08/28/2020       Results reviewed, labs MET treatment parameters, ok to proceed with treatment.  ECHO/MUGA completed 7/13/2020  EF 60-65%.    Post Infusion Assessment:  Patient tolerated infusion without incident.  Blood return noted pre and post infusion.  No evidence of extravasations.  Access discontinued per protocol.    Discharge Plan:   Prescription refills given for ativan, decadron, and zofran.  Discharge instructions reviewed with: Patient.  AVS to patient via [employer].  Patient will return 9/18 for next appointment.   Patient  discharged in stable condition accompanied by: self.  Departure Mode: Ambulatory.  Face to Face time: 0.    Hiwot Hirsch RN

## 2020-09-02 ENCOUNTER — TELEPHONE (OUTPATIENT)
Dept: ONCOLOGY | Facility: CLINIC | Age: 40
End: 2020-09-02

## 2020-09-02 ENCOUNTER — MYC MEDICAL ADVICE (OUTPATIENT)
Dept: ONCOLOGY | Facility: CLINIC | Age: 40
End: 2020-09-02

## 2020-09-02 RX ORDER — PREDNISONE 20 MG/1
20 TABLET ORAL DAILY
Qty: 5 TABLET | Refills: 0 | Status: SHIPPED | OUTPATIENT
Start: 2020-09-02 | End: 2021-06-24

## 2020-09-02 NOTE — TELEPHONE ENCOUNTER
Leah calling to report new rash that started when she woke up and is continuing to spread. Is on her trunk and going up her shoulders. On her arms and buttocks. They are pea sized raised spots that are not itchy. No blistering or crusting. Denies any new products or exposures. Will send mychart photos to show provider.

## 2020-09-02 NOTE — TELEPHONE ENCOUNTER
I spoke with Leah regarding the rash she developed.  She woke up with the rash this morning.  Pruritic.  In areas, it started oozing from scratching.  No blisters that she can outline.  It is on her hands, trunk, and some of her legs.    We discussed this is likely an allergy vs dermatitis.  I advised benadryl 50 mg every six hours today.  If no improvement, prednisone 20 mg daily.    She has our contact number and was told to call if not improving.  We also discussed if she develops any shortness of breath, she go in to the ER for evaluation.    Lucita Gerardo MD

## 2020-09-02 NOTE — TELEPHONE ENCOUNTER
[facility] Telephone Triage Note    Assessment: Patient called in to triage reporting the following symptoms: Leah got out of the shower this morning and noticed a rash on her arms, upon further inspection, she noticed that it is on her buttocks and shoulders as well. She states that the arms are very itchy and the should area seems to have broken as she had some spots of blood on the shirt she had put on and they blisters are now scabbed over. No new skin products, received her last chemo on Friday, 8/28.

## 2020-09-16 ENCOUNTER — VIRTUAL VISIT (OUTPATIENT)
Dept: ONCOLOGY | Facility: CLINIC | Age: 40
End: 2020-09-16
Attending: GENETIC COUNSELOR, MS
Payer: COMMERCIAL

## 2020-09-16 DIAGNOSIS — Z17.0 MALIGNANT NEOPLASM OF UPPER-INNER QUADRANT OF RIGHT BREAST IN FEMALE, ESTROGEN RECEPTOR POSITIVE (H): Primary | ICD-10-CM

## 2020-09-16 DIAGNOSIS — C50.211 MALIGNANT NEOPLASM OF UPPER-INNER QUADRANT OF RIGHT BREAST IN FEMALE, ESTROGEN RECEPTOR POSITIVE (H): Primary | ICD-10-CM

## 2020-09-16 PROCEDURE — 40000072 ZZH STATISTIC GENETIC COUNSELING, < 16 MIN: Mod: GT,ZF | Performed by: GENETIC COUNSELOR, MS

## 2020-09-16 NOTE — LETTER
Cancer Risk Management  Program Locations    [facility]  [facility]  Mailing Address  Cancer Risk Management Program  [facility]  [address]  [place] [address]    New patient appointments  [phone]  December 18, 2020    Leah Rangel  [address] [place] [address]      Dear Leah,    It was a pleasure speaking with you over video for genetic counseling on 9/16/20. Here is a copy of the progress note from our discussion. If you have any additional questions, please feel free to call.    Referring Provider: Lucita Gerardo MD     Presenting Information:  I spoke to Leah by video to discuss her additional genetic testing results for the Hereditary Cancer Comprehensive 84 gene panel ordered from the Molecular Diagnostics Laboratory at [facility]. This testing was done because of her personal and family history of breast cancer.    We previously spoke by phone on 8/21/20 to review the results of the Breast Actionable Panel.     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 This variant is called c.8558C>T (also known as [id number]). Given the uncertain significance of this result, medical management decisions should NOT be made based on this test result alone.     Of note, Leah tested negative for mutations or variants of uncertain significance in the following genes by sequencing and deletion/duplication analysis: ALK, APC, AXIN2, BAP1, BARD1, BLM, BMPR1A, BRCA1, BRCA2, BRIP1, CASR, CDC73, CDH1, CDK4, CDKN1B, CDKN1C, CDKN2A, CEBPA, CHEK2, CTNNA1, DICER1, DIS3L2, EGFR, EPCAM, FH, FLCN, GATA2, GPC3, GREM1, HOXB13, HRAS, KIT, MAX, MEN1, MET, MITF, MLH1, MSH2, MSH3, MSH6, MUTYH, NBN, NF1, NF2, NTHL1,  PALB2, PDGFRA, PHOX2B, PMS2, POLD1, POLE, POT1, OREAW0M, PTCH1, PTCH2, PTEN, RAD50, RAD51C, RAD51D, RB1, RECQL4, RET, RUNX1, SDHA, SDHAF2, SDHB, SDHC, SDHD, SMAD4, SMARCA4, SMARCB1, SMARCE1, STK11, SUFU, TERC, TERT, PBSF389, TP53, TSC1, TSC2, VHL, WRN, WT1.  We reviewed the autosomal dominant inheritance of these genes. Leah cannot pass on a mutation in any of these genes to her children based on this test result. Mutations in these genes do not skip generations.       Interpretation:  We discussed several different interpretations of this inconclusive test result. It is not clear if this variant in the JOSS gene is associated with increased cancer risk.  1. This variant may be a benign change that does not increase cancer risk.  2. This variant may be a harmful mutation that causes an increased cancer risk.     We discussed that known pathogenic (harmful) mutations in the JOSS gene are associated with a increased risk for breast, pancreatic, and possibly prostate cancer. We reviewed that JOSS is a moderate-risk breast cancer gene. The risk for breast cancer in women with an JOSS mutation is estimated to be approximately 24-48%. We also discussed the association of JOSS mutations with increased risk for pancreatic and prostate cancer; however data is still limited in this area. In rare situations in which both parents have a harmful mutation in the JOSS gene, their children each have a 25% risk for ataxia-telangiectasia. Ataxia-telangiectasia is a rare autosomal recessive disorder that typically presents in childhood and can present with widened blood vessels called telangiectasias, progressive neurologic symptoms, immune deficiency, and increased risk for childhood cancers. If this variant is later classified as harmful, Leah would be considered a carrier for ataxia-telangiectasia. In that case, genetic counseling and genetic testing may be advised for her partner.     The laboratory is working to determine if this  variant is harmful or benign, and they will contact me if it is reclassified. If this variant is determined to be a benign change, there may be a different gene or combination of genes and environment that are associated with the cancers in this family.     It is also important to consider that her relatives may have a mutation in one of the genes tested and she did not inherit it.       Inheritance:  We reviewed the autosomal dominant inheritance of this variant in the JOSS gene. We discussed that Leah has a 50% chance to pass this variant to each of her children. Likewise, each of her siblings has a 50% chance of having the same variant. Because it is unclear what, if any, risk is associated with this variant, clinical genetic testing for this JOSS variant alone is not recommended for relatives.     Screening:   Based on this inconclusive test result, it is important for Leah and her relatives to refer back to the family history for appropriate cancer screening.      She should continue with her breast cancer treatment and screening as recommended by her oncology team.    Other population cancer screening options, including colonoscopies, gynecologic exams, and those recommended by the American Cancer Society and the National Comprehensive Cancer Network (NCCN), are also appropriate for Leah and her family. These screening recommendations may change if there are changes to Leah's personal and/or family history of cancer. Final screening recommendations should be made by each individual's managing physician.    Leah s close female relatives remain at increased risk for breast cancer given their family history. Breast cancer screening is generally recommended to begin approximately 10 years younger than the earliest age of breast cancer diagnosis in the family, or at age 40, whichever comes first. In this family, screening may begin at age 31. Breast screening options should be discussed with an  individual's primary care provider and a genetic counselor, to determine at what age to begin screening, what screening is appropriate, and if additional screening (such as breast MRI) is necessary based on personal/family history factors.       Additional Testing Considerations:  Although Leah's genetic testing result was inconclusive, other relatives may still carry a harmful gene mutation associated with an increased risk for cancer. Genetic counseling is recommended for her paternal aunt who had breast cancer to discuss genetic testing options. If any of her relatives do pursue genetic testing, Leah is encouraged to contact me so that we may review the impact of their test results on her.    Summary:  We do not have an explanation for Leah's breast cancer. While no genetic changes were identified, Leah may still be at risk for certain cancers due to family history, environmental factors, or other genetic causes not identified by this test. Because of that, it is important that she continue with cancer screening based on her personal and family history as discussed above.    Genetic testing is rapidly advancing, and new cancer susceptibility genes will most likely be identified in the future.  Therefore, I encouraged Leah to contact me annually or if there are changes in her personal or family history. This may change how we assess her cancer risk, screening, and the testing we would offer.    Plan:  1.  Leah will be mailed a copy of her test results.    2.  She plans to follow-up with her physicians.  3.  I will contact Leah if the laboratory informs me that this VUS has been reclassified.  This may change screening and testing recommendations for Leah and her relatives.     If Leah has any further questions, I encouraged her to contact me at [phone].      Ayah Pratt MS, [facility]  Licensed, Certified Genetic Counselor  Office: [phone]  Email:  [email or web address]

## 2020-09-16 NOTE — PROGRESS NOTES
"9/16/20    Leah Rangel is a 40 year old female who is being evaluated via a billable video visit.      The patient has been notified of following:     \"This video visit will be conducted via a call between you and your physician/provider. We have found that certain health care needs can be provided without the need for an in-person physical exam.  This service lets us provide the care you need with a video conversation.  If a prescription is necessary we can send it directly to your pharmacy.  If lab work is needed we can place an order for that and you can then stop by our lab to have the test done at a later time.    Video visits are billed at different rates depending on your insurance coverage.  Please reach out to your insurance provider with any questions.    If during the course of the call the physician/provider feels a video visit is not appropriate, you will not be charged for this service.\"    Patient has given verbal consent for Video visit? Yes    Video-Visit Details    Type of service:  Video Visit    Video Start Time: 08:02 am   Video End Time: 08:11 am     Originating Location (pt. Location): Home    Distant Location (provider location):  Cancer Risk Management Program    Platform used for Video Visit: [facility]     Referring Provider: Lucita eGrardo MD     Presenting Information:  I spoke to Leah by video to discuss her additional genetic testing results for the Hereditary Cancer Comprehensive 84 gene panel ordered from the Molecular Diagnostics Laboratory at [facility]. This testing was done because of her personal and family history of breast cancer.    We previously spoke by phone on 8/21/20 to review the results of the Breast Actionable Panel.     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 This variant is called c.8558C>T (also known as [id number]). Given the uncertain " "significance of this result, medical management decisions should NOT be made based on this test result alone.     Of note, Leah tested negative for mutations or variants of uncertain significance in the following genes by sequencing and deletion/duplication analysis: ALK, APC, AXIN2, BAP1, BARD1, BLM, BMPR1A, BRCA1, BRCA2, BRIP1, CASR, CDC73, CDH1, CDK4, CDKN1B, CDKN1C, CDKN2A, CEBPA, CHEK2, CTNNA1, DICER1, DIS3L2, EGFR, EPCAM, FH, FLCN, GATA2, GPC3, GREM1, HOXB13, HRAS, KIT, MAX, MEN1, MET, MITF, MLH1, MSH2, MSH3, MSH6, MUTYH, NBN, NF1, NF2, NTHL1, PALB2, PDGFRA, PHOX2B, PMS2, POLD1, POLE, POT1, LTQOK9B, PTCH1, PTCH2, PTEN, RAD50, RAD51C, RAD51D, RB1, RECQL4, RET, RUNX1, SDHA, SDHAF2, SDHB, SDHC, SDHD, SMAD4, SMARCA4, SMARCB1, SMARCE1, STK11, SUFU, TERC, TERT, [id number], TP53, TSC1, TSC2, VHL, WRN, WT1.  We reviewed the autosomal dominant inheritance of these genes. Leah cannot pass on a mutation in any of these genes to her children based on this test result. Mutations in these genes do not skip generations.       A copy of the test report can be found in the Laboratory tab, dated 8/5/20, and named \"Next Generation Sequencing\".      Interpretation:  We discussed several different interpretations of this inconclusive test result. It is not clear if this variant in the JOSS gene is associated with increased cancer risk.  1. This variant may be a benign change that does not increase cancer risk.  2. This variant may be a harmful mutation that causes an increased cancer risk.     We discussed that known pathogenic (harmful) mutations in the JOSS gene are associated with a increased risk for breast, pancreatic, and possibly prostate cancer. We reviewed that JOSS is a moderate-risk breast cancer gene. The risk for breast cancer in women with an JOSS mutation is estimated to be approximately 24-48%. We also discussed the association of JOSS mutations with increased risk for pancreatic and prostate cancer; however data is " still limited in this area. In rare situations in which both parents have a harmful mutation in the JOSS gene, their children each have a 25% risk for ataxia-telangiectasia. Ataxia-telangiectasia is a rare autosomal recessive disorder that typically presents in childhood and can present with widened blood vessels called telangiectasias, progressive neurologic symptoms, immune deficiency, and increased risk for childhood cancers. If this variant is later classified as harmful, Leah would be considered a carrier for ataxia-telangiectasia. In that case, genetic counseling and genetic testing may be advised for her partner.     The laboratory is working to determine if this variant is harmful or benign, and they will contact me if it is reclassified. If this variant is determined to be a benign change, there may be a different gene or combination of genes and environment that are associated with the cancers in this family.     It is also important to consider that her relatives may have a mutation in one of the genes tested and she did not inherit it.       Inheritance:  We reviewed the autosomal dominant inheritance of this variant in the JOSS gene. We discussed that Leah has a 50% chance to pass this variant to each of her children. Likewise, each of her siblings has a 50% chance of having the same variant. Because it is unclear what, if any, risk is associated with this variant, clinical genetic testing for this JOSS variant alone is not recommended for relatives.     Screening:   Based on this inconclusive test result, it is important for Leah and her relatives to refer back to the family history for appropriate cancer screening.      She should continue with her breast cancer treatment and screening as recommended by her oncology team.    Other population cancer screening options, including colonoscopies, gynecologic exams, and those recommended by the American Cancer Society and the National Comprehensive  Cancer Network (NCCN), are also appropriate for Leah and her family. These screening recommendations may change if there are changes to Leah's personal and/or family history of cancer. Final screening recommendations should be made by each individual's managing physician.    Leah s close female relatives remain at increased risk for breast cancer given their family history. Breast cancer screening is generally recommended to begin approximately 10 years younger than the earliest age of breast cancer diagnosis in the family, or at age 40, whichever comes first. In this family, screening may begin at age 31. Breast screening options should be discussed with an individual's primary care provider and a genetic counselor, to determine at what age to begin screening, what screening is appropriate, and if additional screening (such as breast MRI) is necessary based on personal/family history factors.       Additional Testing Considerations:  Although Leah's genetic testing result was inconclusive, other relatives may still carry a harmful gene mutation associated with an increased risk for cancer. Genetic counseling is recommended for her paternal aunt who had breast cancer to discuss genetic testing options. If any of her relatives do pursue genetic testing, Leah is encouraged to contact me so that we may review the impact of their test results on her.    Summary:  We do not have an explanation for Leylas breast cancer. While no genetic changes were identified, Leah may still be at risk for certain cancers due to family history, environmental factors, or other genetic causes not identified by this test. Because of that, it is important that she continue with cancer screening based on her personal and family history as discussed above.    Genetic testing is rapidly advancing, and new cancer susceptibility genes will most likely be identified in the future.  Therefore, I encouraged Leah to contact me  annually or if there are changes in her personal or family history. This may change how we assess her cancer risk, screening, and the testing we would offer.    Plan:  1.  Leah will be mailed a copy of her test results.    2.  She plans to follow-up with her physicians.  3.  I will contact Leah if the laboratory informs me that this VUS has been reclassified.  This may change screening and testing recommendations for Leah and her relatives.     If Leah has any further questions, I encouraged her to contact me at [phone].      Time spent over video: 9 minutes     Ayah Pratt MS, [facility]  Licensed, Certified Genetic Counselor  Office: [phone]  Email: [email or web address]

## 2020-09-16 NOTE — Clinical Note
Please send copy of letter to patient with test results. Please enclose test results: Next Generation Sequencing [[id number]] (Order [id number])

## 2020-09-17 ENCOUNTER — VIRTUAL VISIT (OUTPATIENT)
Dept: ONCOLOGY | Facility: CLINIC | Age: 40
End: 2020-09-17
Attending: INTERNAL MEDICINE
Payer: COMMERCIAL

## 2020-09-17 DIAGNOSIS — C50.211 MALIGNANT NEOPLASM OF UPPER-INNER QUADRANT OF RIGHT BREAST IN FEMALE, ESTROGEN RECEPTOR POSITIVE (H): Primary | ICD-10-CM

## 2020-09-17 DIAGNOSIS — Z17.0 MALIGNANT NEOPLASM OF UPPER-INNER QUADRANT OF RIGHT BREAST IN FEMALE, ESTROGEN RECEPTOR POSITIVE (H): Primary | ICD-10-CM

## 2020-09-17 PROCEDURE — 40001009 ZZH VIDEO/TELEPHONE VISIT; NO CHARGE

## 2020-09-17 PROCEDURE — 99215 OFFICE O/P EST HI 40 MIN: CPT | Mod: 95 | Performed by: INTERNAL MEDICINE

## 2020-09-17 RX ORDER — ACETAMINOPHEN 325 MG/1
650 TABLET ORAL
Status: CANCELLED
Start: 2020-09-18

## 2020-09-17 RX ORDER — ALBUTEROL SULFATE 90 UG/1
1-2 AEROSOL, METERED RESPIRATORY (INHALATION)
Status: CANCELLED
Start: 2020-09-18

## 2020-09-17 RX ORDER — HEPARIN SODIUM (PORCINE) LOCK FLUSH IV SOLN 100 UNIT/ML 100 UNIT/ML
5 SOLUTION INTRAVENOUS
Status: CANCELLED | OUTPATIENT
Start: 2020-09-18

## 2020-09-17 RX ORDER — HEPARIN SODIUM,PORCINE 10 UNIT/ML
5 VIAL (ML) INTRAVENOUS
Status: CANCELLED | OUTPATIENT
Start: 2020-09-18

## 2020-09-17 RX ORDER — DIPHENHYDRAMINE HYDROCHLORIDE 50 MG/ML
50 INJECTION INTRAMUSCULAR; INTRAVENOUS
Status: CANCELLED
Start: 2020-09-18

## 2020-09-17 RX ORDER — METHYLPREDNISOLONE SODIUM SUCCINATE 125 MG/2ML
125 INJECTION, POWDER, LYOPHILIZED, FOR SOLUTION INTRAMUSCULAR; INTRAVENOUS
Status: CANCELLED
Start: 2020-09-18

## 2020-09-17 RX ORDER — LORAZEPAM 2 MG/ML
0.5 INJECTION INTRAMUSCULAR EVERY 4 HOURS PRN
Status: CANCELLED
Start: 2020-09-18

## 2020-09-17 RX ORDER — NALOXONE HYDROCHLORIDE 0.4 MG/ML
.1-.4 INJECTION, SOLUTION INTRAMUSCULAR; INTRAVENOUS; SUBCUTANEOUS
Status: CANCELLED | OUTPATIENT
Start: 2020-09-18

## 2020-09-17 RX ORDER — SODIUM CHLORIDE 9 MG/ML
1000 INJECTION, SOLUTION INTRAVENOUS CONTINUOUS PRN
Status: CANCELLED
Start: 2020-09-18

## 2020-09-17 RX ORDER — DIPHENHYDRAMINE HCL 25 MG
50 CAPSULE ORAL
Status: CANCELLED | OUTPATIENT
Start: 2020-09-18

## 2020-09-17 RX ORDER — MEPERIDINE HYDROCHLORIDE 25 MG/ML
25 INJECTION INTRAMUSCULAR; INTRAVENOUS; SUBCUTANEOUS EVERY 30 MIN PRN
Status: CANCELLED | OUTPATIENT
Start: 2020-09-18

## 2020-09-17 RX ORDER — ALBUTEROL SULFATE 0.83 MG/ML
2.5 SOLUTION RESPIRATORY (INHALATION)
Status: CANCELLED | OUTPATIENT
Start: 2020-09-18

## 2020-09-17 RX ORDER — EPINEPHRINE 1 MG/ML
0.3 INJECTION, SOLUTION INTRAMUSCULAR; SUBCUTANEOUS EVERY 5 MIN PRN
Status: CANCELLED | OUTPATIENT
Start: 2020-09-18

## 2020-09-17 NOTE — LETTER
"    9/17/2020         RE: Leah Rangel  [address] [place] [address]        Dear Colleague,    Thank you for referring your patient, Leah Rangel, to the [facility]. Please see a copy of my visit note below.    Leah Rangel is a 40 year old female who is being evaluated via a billable video visit.      The patient has been notified of following:     \"This video visit will be conducted via a call between you and your physician/provider. We have found that certain health care needs can be provided without the need for an in-person physical exam.  This service lets us provide the care you need with a video conversation.  If a prescription is necessary we can send it directly to your pharmacy.  If lab work is needed we can place an order for that and you can then stop by our lab to have the test done at a later time.    Video visits are billed at different rates depending on your insurance coverage.  Please reach out to your insurance provider with any questions.    If during the course of the call the physician/provider feels a video visit is not appropriate, you will not be charged for this service.\"    Patient has given verbal consent for Video visit? Yes    How would you like to obtain your AVS? MyChart     If you are dropped from the video visit, the video invite should be resent to: Text to cell phone: [phone]     Will anyone else be joining your video visit? No          I have reviewed and updated the patient's allergies and pt confirmed any changes to their medication list.  Patient was asked to provide any patient recorded vital signs, height and/or weight.  Please see \"Patient Reported Vital Signs\" tab for that information.  Patient instructed to be in the virtual waiting room 10-15 minutes prior to appointment time.      Concerns: Patient has no new concerns.        Refills: Dexamethasone        Yosef Vargas, JOE          Video-Visit Details    Type of service:  " Video Visit    Video Time:  25 min  Originating Location (pt. Location): Home    Distant Location (provider location):  [facility]     Platform used for Video Visit: Wes Gerardo MD        Reason for Visit: F/U for invasive ductal carcinoma of R breast, grade 3, ER positive, MO positive, HER-2 positive, 3+ by IHC    Oncology HPI:   Leah Rangel is a  40 year old, premenopausal female, diagnosed with R breast cancer, T2Nx right breast cancer.      Leah reports that she has a history of bilateral implants.  She noticed a palpable mass involving her right breast in the upper inner quadrant in early 06/2020.  At that time, she went in for an evaluation with a mammogram and ultrasound.  Mammogram on 06/11/2020 revealed a 7 mm mass at the 1 o'clock position approximately 5 cm from the nipple in the upper inner quadrant of the right breast.  There was a second area measuring 6 x 5 x 4 mm at the 1 o'clock position 6 cm from the nipple.  In the left breast, there was an area of microcalcifications.  She was referred for a right breast biopsy.  Both areas at the 1 o'clock position were biopsied.  No lymph nodes were appreciated.  Both of these biopsies revealed an invasive ductal carcinoma, grade 3, ER positive, MO positive, HER-2 positive, 3+ by IHC.  She  was considered to have a T1b multifocal N1 breast cancer.  On further review, it appeared to be T2Nx. She was subsequently referred for a breast MRI.  The BMRI done on 7/10/20 showed a 4.5 x 2.4 x 3 cm mass in the R upper, inner breast. She underwent a PET/CT 7/16 and started neoadjuvant treatment with Docetaxel/Carboplatin/Trastuzumab/Pertuzumab (TCHP) 7/17/20.  Lymph nodes appeared normal on MRI and PET.    Interval history:    -Reports diarrhea since first cycle of chemo. Reports she is taking 2 imodium then another after each episode. Reports 3-6 BM per day. Is improving. Reports it is not overly bothersome to her.  Significantly improved from cycle 1    -Nausea post chemo is bad days 3-10 but then improves. Reports during this time nausea is 8-9/10. Takes zofran first thing in morning then takes 2 times throughout the day. Reports it does enough to get her through the day. Has not tried compazine.  She prefers not to change what she is doing.  She typically eats a lot the last 10 days of each cycle given poor appetite the first few weeks.    -Reports sleep is okay. Taking tyelnol PM and this is helping. Takes ativan if she needs it- not daily.     -Reports mood has been good, getting stronger, more independent.       -Reports breast mass is significantly decreased in size and hard to find now    -Denies fever, chills, chest pain, dyspnea, abdominal pain, vomiting, and constipation    She has many questions regarding next steps.     ROS: 10 point ROS neg other than the symptoms noted above in the HPI.    Past Medical History:   Diagnosis Date     Breast cancer (H)      Complication of anesthesia      PONV (postoperative nausea and vomiting)         Current Outpatient Medications   Medication Sig Dispense Refill     dexamethasone (DECADRON) 4 MG tablet Take 2 tablets (8 mg) by mouth 2 times daily Evening AFTER Docetaxel and continue for 4 additional doses. 10 tablet 5     diazepam (VALIUM) 5 MG tablet Take one tablet one hour prior to scan. May repeat dose one time. 2 tablet 0     lidocaine (XYLOCAINE) 2 % external gel Apply topically 3 times daily as needed for moderate pain 30 mL 1     lidocaine-prilocaine (EMLA) 2.5-2.5 % external cream Apply topically as needed for moderate pain Apply to PORT site 45 minutes prior to procedure.  Cover with a non-absorbent dressing. 30 g 4     LORazepam (ATIVAN) 0.5 MG tablet Take 1 tablet (0.5 mg) by mouth every 4 hours as needed (Anxiety, Nausea/Vomiting or Sleep) 30 tablet 5     magic mouthwash suspension, diphenhydrAMINE, lidocaine, aluminum-magnesium & simethicone, (FIRST-MOUTHWASH BLM)  compounding kit Swish and swallow 5-10 mLs in mouth every 6 hours as needed for mouth sores 237 mL 1     methylphenidate (RITALIN) 5 MG tablet Take 0.5-1 tablets (2.5-5 mg) by mouth 2 times daily as needed (fatigue) Take no later than 2pm 60 tablet 0     morphine (MSIR) 15 MG IR tablet Take 0.5-1 tablets (7.5-15 mg) by mouth every 4 hours as needed for severe pain 45 tablet 0     ondansetron (ZOFRAN) 8 MG tablet Take 1 tablet (8 mg) by mouth every 8 hours as needed (Nausea/Vomiting) 30 tablet 3     order for DME Equipment: Wig  Diagnosis: cranial alopecia 1 each 0     predniSONE (DELTASONE) 20 MG tablet Take 1 tablet (20 mg) by mouth daily 5 tablet 0     prochlorperazine (COMPAZINE) 10 MG tablet Take 1 tablet (10 mg) by mouth every 6 hours as needed (Nausea/Vomiting) 30 tablet 5     Vaginal Lubricant (REPLENS) GEL Apply vaginally as needed 35 g 1          Allergies   Allergen Reactions     Codeine Other (See Comments)     Got hyper       Exam:  There were no vitals taken for this visit.  Wt Readings from Last 4 Encounters:   08/28/20 49 kg (108 lb)   08/07/20 47.5 kg (104 lb 12.8 oz)   07/23/20 44.5 kg (98 lb)   07/17/20 46.7 kg (102 lb 13.6 oz)   GENERAL: Healthy, alert and no distress  EYES: Eyes grossly normal to inspection.  No discharge or erythema, or obvious scleral/conjunctival abnormalities.  RESP: No audible wheeze, cough, or visible cyanosis.  No visible retractions or increased work of breathing.    SKIN: Visible skin clear. No significant rash, abnormal pigmentation or lesions.  NEURO: Cranial nerves grossly intact.  Mentation and speech appropriate for age.  PSYCH: Mentation appears normal, affect normal/bright, judgement and insight intact, normal speech and appearance well-groomed.  Remainder of exam deferred due to the covid pandemic and video visit taking place.    Labs:    Lab Results   Component Value Date    WBC 5.1 08/28/2020     Lab Results   Component Value Date    RBC 3.02 08/28/2020     Lab  Results   Component Value Date    HGB 11.5 08/28/2020     Lab Results   Component Value Date    HCT 33.2 08/28/2020     No components found for: MCT  Lab Results   Component Value Date     08/28/2020     Lab Results   Component Value Date    MCH 38.1 08/28/2020     Lab Results   Component Value Date    MCHC 34.6 08/28/2020     Lab Results   Component Value Date    RDW 14.7 08/28/2020     Lab Results   Component Value Date     08/28/2020         Assessment/plan:   1.) ER/FL +, HER-2 positive R upper/inner quadrant breast cancer.   Clinically T2NX  -Leah really struggled after her first chemotherapy. Her main side effects were diarrhea, fatigue, nausea, and mouth soreness; all contributing to poor nutrition and weight loss. These adverse effects have significantly improved with each round of treatment. She is feeling stronger and ready to proceed with infusion tomorrow.      We discussed continuing six cycles of TCHP and then a herceptin based therapy to complete one year.      I would like her to go back and see her surgeon and plastic surgeon in October.  On review of her imaging up front, she had a T2N0 lesion.  Nodes were negative by exam with Dr Vargas and by PET and MRI.  I do not anticipate she will need radiation.    She is planning bilateral mastectomy and reconstruction.     2.) Fatigue  -2/2 chemotherapy. Improves the second week after chemo. Encouraged her to continue daily activity.      3.) Pain  -The pain in her right breast has greatly decreased, along with the size of her tumor. Uses tylenol for pain relief- feels well managed on this.      4.) Anorexia  -Improved. Has regained wt she previously lost. Eating well. Continue to follow.      5.) Coping  -Pt reports she is feeling much stronger.  Has a good support system. She is very encouraged that her tumor is shrinking in size as it makes all these challenges from chemo toxicities worth it.      5.) Diarrhea  -Improved, down to 3-6 BM  daily. Not bothersome to patient. Using imodium intermittently.   Continue to monitor.      6.) Nausea  -Improved, most notable days 4-10. Takes zofran 2-3 times per day on these days. Reports this makes it manageable. Does have compazine and ativan, has not tried them because she doesn't like to take lots of pills. Encouraged her to try compazine if needed    Lucita Gerardo MD

## 2020-09-17 NOTE — PROGRESS NOTES
"Leah Rangel is a 40 year old female who is being evaluated via a billable video visit.      The patient has been notified of following:     \"This video visit will be conducted via a call between you and your physician/provider. We have found that certain health care needs can be provided without the need for an in-person physical exam.  This service lets us provide the care you need with a video conversation.  If a prescription is necessary we can send it directly to your pharmacy.  If lab work is needed we can place an order for that and you can then stop by our lab to have the test done at a later time.    Video visits are billed at different rates depending on your insurance coverage.  Please reach out to your insurance provider with any questions.    If during the course of the call the physician/provider feels a video visit is not appropriate, you will not be charged for this service.\"    Patient has given verbal consent for Video visit? Yes    How would you like to obtain your AVS? MyChart     If you are dropped from the video visit, the video invite should be resent to: Text to cell phone: [phone]     Will anyone else be joining your video visit? No          I have reviewed and updated the patient's allergies and pt confirmed any changes to their medication list.  Patient was asked to provide any patient recorded vital signs, height and/or weight.  Please see \"Patient Reported Vital Signs\" tab for that information.  Patient instructed to be in the virtual waiting room 10-15 minutes prior to appointment time.      Concerns: Patient has no new concerns.        Refills: Dexamethasone        JOE Waller          Video-Visit Details    Type of service:  Video Visit    Video Time:  25 min  Originating Location (pt. Location): Home    Distant Location (provider location):  [facility]     Platform used for Video Visit: [employer]    Lucita Gerardo MD        Reason for Visit: " F/U for invasive ductal carcinoma of R breast, grade 3, ER positive, NE positive, HER-2 positive, 3+ by IHC    Oncology HPI:   Leah Rangel is a  40 year old, premenopausal female, diagnosed with R breast cancer, T2Nx right breast cancer.      Leah reports that she has a history of bilateral implants.  She noticed a palpable mass involving her right breast in the upper inner quadrant in early 06/2020.  At that time, she went in for an evaluation with a mammogram and ultrasound.  Mammogram on 06/11/2020 revealed a 7 mm mass at the 1 o'clock position approximately 5 cm from the nipple in the upper inner quadrant of the right breast.  There was a second area measuring 6 x 5 x 4 mm at the 1 o'clock position 6 cm from the nipple.  In the left breast, there was an area of microcalcifications.  She was referred for a right breast biopsy.  Both areas at the 1 o'clock position were biopsied.  No lymph nodes were appreciated.  Both of these biopsies revealed an invasive ductal carcinoma, grade 3, ER positive, NE positive, HER-2 positive, 3+ by IHC.  She  was considered to have a T1b multifocal N1 breast cancer.  On further review, it appeared to be T2Nx. She was subsequently referred for a breast MRI.  The BMRI done on 7/10/20 showed a 4.5 x 2.4 x 3 cm mass in the R upper, inner breast. She underwent a PET/CT 7/16 and started neoadjuvant treatment with Docetaxel/Carboplatin/Trastuzumab/Pertuzumab (TCHP) 7/17/20.  Lymph nodes appeared normal on MRI and PET.    Interval history:    -Reports diarrhea since first cycle of chemo. Reports she is taking 2 imodium then another after each episode. Reports 3-6 BM per day. Is improving. Reports it is not overly bothersome to her. Significantly improved from cycle 1    -Nausea post chemo is bad days 3-10 but then improves. Reports during this time nausea is 8-9/10. Takes zofran first thing in morning then takes 2 times throughout the day. Reports it does enough to get her through the  day. Has not tried compazine.  She prefers not to change what she is doing.  She typically eats a lot the last 10 days of each cycle given poor appetite the first few weeks.    -Reports sleep is okay. Taking tyelnol PM and this is helping. Takes ativan if she needs it- not daily.     -Reports mood has been good, getting stronger, more independent.       -Reports breast mass is significantly decreased in size and hard to find now    -Denies fever, chills, chest pain, dyspnea, abdominal pain, vomiting, and constipation    She has many questions regarding next steps.     ROS: 10 point ROS neg other than the symptoms noted above in the HPI.    Past Medical History:   Diagnosis Date     Breast cancer (H)      Complication of anesthesia      PONV (postoperative nausea and vomiting)         Current Outpatient Medications   Medication Sig Dispense Refill     dexamethasone (DECADRON) 4 MG tablet Take 2 tablets (8 mg) by mouth 2 times daily Evening AFTER Docetaxel and continue for 4 additional doses. 10 tablet 5     diazepam (VALIUM) 5 MG tablet Take one tablet one hour prior to scan. May repeat dose one time. 2 tablet 0     lidocaine (XYLOCAINE) 2 % external gel Apply topically 3 times daily as needed for moderate pain 30 mL 1     lidocaine-prilocaine (EMLA) 2.5-2.5 % external cream Apply topically as needed for moderate pain Apply to PORT site 45 minutes prior to procedure.  Cover with a non-absorbent dressing. 30 g 4     LORazepam (ATIVAN) 0.5 MG tablet Take 1 tablet (0.5 mg) by mouth every 4 hours as needed (Anxiety, Nausea/Vomiting or Sleep) 30 tablet 5     magic mouthwash suspension, diphenhydrAMINE, lidocaine, aluminum-magnesium & simethicone, (FIRST-MOUTHWASH BLM) compounding kit Swish and swallow 5-10 mLs in mouth every 6 hours as needed for mouth sores 237 mL 1     methylphenidate (RITALIN) 5 MG tablet Take 0.5-1 tablets (2.5-5 mg) by mouth 2 times daily as needed (fatigue) Take no later than 2pm 60 tablet 0      morphine (MSIR) 15 MG IR tablet Take 0.5-1 tablets (7.5-15 mg) by mouth every 4 hours as needed for severe pain 45 tablet 0     ondansetron (ZOFRAN) 8 MG tablet Take 1 tablet (8 mg) by mouth every 8 hours as needed (Nausea/Vomiting) 30 tablet 3     order for DME Equipment: Wig  Diagnosis: cranial alopecia 1 each 0     predniSONE (DELTASONE) 20 MG tablet Take 1 tablet (20 mg) by mouth daily 5 tablet 0     prochlorperazine (COMPAZINE) 10 MG tablet Take 1 tablet (10 mg) by mouth every 6 hours as needed (Nausea/Vomiting) 30 tablet 5     Vaginal Lubricant (REPLENS) GEL Apply vaginally as needed 35 g 1          Allergies   Allergen Reactions     Codeine Other (See Comments)     Got hyper       Exam:  There were no vitals taken for this visit.  Wt Readings from Last 4 Encounters:   08/28/20 49 kg (108 lb)   08/07/20 47.5 kg (104 lb 12.8 oz)   07/23/20 44.5 kg (98 lb)   07/17/20 46.7 kg (102 lb 13.6 oz)   GENERAL: Healthy, alert and no distress  EYES: Eyes grossly normal to inspection.  No discharge or erythema, or obvious scleral/conjunctival abnormalities.  RESP: No audible wheeze, cough, or visible cyanosis.  No visible retractions or increased work of breathing.    SKIN: Visible skin clear. No significant rash, abnormal pigmentation or lesions.  NEURO: Cranial nerves grossly intact.  Mentation and speech appropriate for age.  PSYCH: Mentation appears normal, affect normal/bright, judgement and insight intact, normal speech and appearance well-groomed.  Remainder of exam deferred due to the covid pandemic and video visit taking place.    Labs:    Lab Results   Component Value Date    WBC 5.1 08/28/2020     Lab Results   Component Value Date    RBC 3.02 08/28/2020     Lab Results   Component Value Date    HGB 11.5 08/28/2020     Lab Results   Component Value Date    HCT 33.2 08/28/2020     No components found for: MCT  Lab Results   Component Value Date     08/28/2020     Lab Results   Component Value Date    MCH  38.1 08/28/2020     Lab Results   Component Value Date    MCHC 34.6 08/28/2020     Lab Results   Component Value Date    RDW 14.7 08/28/2020     Lab Results   Component Value Date     08/28/2020         Assessment/plan:   1.) ER/UT +, HER-2 positive R upper/inner quadrant breast cancer.   Clinically T2NX  -Leah really struggled after her first chemotherapy. Her main side effects were diarrhea, fatigue, nausea, and mouth soreness; all contributing to poor nutrition and weight loss. These adverse effects have significantly improved with each round of treatment. She is feeling stronger and ready to proceed with infusion tomorrow.      We discussed continuing six cycles of TCHP and then a herceptin based therapy to complete one year.      I would like her to go back and see her surgeon and plastic surgeon in October.  On review of her imaging up front, she had a T2N0 lesion.  Nodes were negative by exam with Dr Vargas and by PET and MRI.  I do not anticipate she will need radiation.    She is planning bilateral mastectomy and reconstruction.     2.) Fatigue  -2/2 chemotherapy. Improves the second week after chemo. Encouraged her to continue daily activity.      3.) Pain  -The pain in her right breast has greatly decreased, along with the size of her tumor. Uses tylenol for pain relief- feels well managed on this.      4.) Anorexia  -Improved. Has regained wt she previously lost. Eating well. Continue to follow.      5.) Coping  -Pt reports she is feeling much stronger.  Has a good support system. She is very encouraged that her tumor is shrinking in size as it makes all these challenges from chemo toxicities worth it.      5.) Diarrhea  -Improved, down to 3-6 BM daily. Not bothersome to patient. Using imodium intermittently.   Continue to monitor.      6.) Nausea  -Improved, most notable days 4-10. Takes zofran 2-3 times per day on these days. Reports this makes it manageable. Does have compazine and ativan,  has not tried them because she doesn't like to take lots of pills. Encouraged her to try compazine if needed    Lucita Gerardo MD

## 2020-09-18 ENCOUNTER — APPOINTMENT (OUTPATIENT)
Dept: LAB | Facility: CLINIC | Age: 40
End: 2020-09-18
Attending: INTERNAL MEDICINE
Payer: COMMERCIAL

## 2020-09-18 ENCOUNTER — INFUSION THERAPY VISIT (OUTPATIENT)
Dept: ONCOLOGY | Facility: CLINIC | Age: 40
End: 2020-09-18
Attending: INTERNAL MEDICINE
Payer: COMMERCIAL

## 2020-09-18 VITALS
DIASTOLIC BLOOD PRESSURE: 74 MMHG | BODY MASS INDEX: 18.57 KG/M2 | SYSTOLIC BLOOD PRESSURE: 120 MMHG | HEART RATE: 86 BPM | OXYGEN SATURATION: 98 % | TEMPERATURE: 98.7 F | WEIGHT: 108.2 LBS | RESPIRATION RATE: 18 BRPM

## 2020-09-18 DIAGNOSIS — Z17.0 MALIGNANT NEOPLASM OF UPPER-INNER QUADRANT OF RIGHT BREAST IN FEMALE, ESTROGEN RECEPTOR POSITIVE (H): Primary | ICD-10-CM

## 2020-09-18 DIAGNOSIS — C50.211 MALIGNANT NEOPLASM OF UPPER-INNER QUADRANT OF RIGHT BREAST IN FEMALE, ESTROGEN RECEPTOR POSITIVE (H): Primary | ICD-10-CM

## 2020-09-18 LAB
ALBUMIN SERPL-MCNC: 3.7 G/DL (ref 3.4–5)
ALP SERPL-CCNC: 56 U/L (ref 40–150)
ALT SERPL W P-5'-P-CCNC: 21 U/L (ref 0–50)
ANION GAP SERPL CALCULATED.3IONS-SCNC: 7 MMOL/L (ref 3–14)
AST SERPL W P-5'-P-CCNC: 12 U/L (ref 0–45)
BASOPHILS # BLD AUTO: 0 10E9/L (ref 0–0.2)
BASOPHILS NFR BLD AUTO: 0.4 %
BILIRUB SERPL-MCNC: 0.3 MG/DL (ref 0.2–1.3)
BUN SERPL-MCNC: 5 MG/DL (ref 7–30)
CALCIUM SERPL-MCNC: 9 MG/DL (ref 8.5–10.1)
CHLORIDE SERPL-SCNC: 107 MMOL/L (ref 94–109)
CO2 SERPL-SCNC: 27 MMOL/L (ref 20–32)
CREAT SERPL-MCNC: 0.58 MG/DL (ref 0.52–1.04)
DIFFERENTIAL METHOD BLD: ABNORMAL
EOSINOPHIL # BLD AUTO: 0 10E9/L (ref 0–0.7)
EOSINOPHIL NFR BLD AUTO: 0.4 %
ERYTHROCYTE [DISTWIDTH] IN BLOOD BY AUTOMATED COUNT: 16.6 % (ref 10–15)
GFR SERPL CREATININE-BSD FRML MDRD: >90 ML/MIN/{1.73_M2}
GLUCOSE SERPL-MCNC: 94 MG/DL (ref 70–99)
HCT VFR BLD AUTO: 30.8 % (ref 35–47)
HGB BLD-MCNC: 10.5 G/DL (ref 11.7–15.7)
IMM GRANULOCYTES # BLD: 0 10E9/L (ref 0–0.4)
IMM GRANULOCYTES NFR BLD: 0.2 %
LYMPHOCYTES # BLD AUTO: 1.9 10E9/L (ref 0.8–5.3)
LYMPHOCYTES NFR BLD AUTO: 33.9 %
MCH RBC QN AUTO: 38.7 PG (ref 26.5–33)
MCHC RBC AUTO-ENTMCNC: 34.1 G/DL (ref 31.5–36.5)
MCV RBC AUTO: 114 FL (ref 78–100)
MONOCYTES # BLD AUTO: 0.3 10E9/L (ref 0–1.3)
MONOCYTES NFR BLD AUTO: 5.1 %
NEUTROPHILS # BLD AUTO: 3.4 10E9/L (ref 1.6–8.3)
NEUTROPHILS NFR BLD AUTO: 60 %
NRBC # BLD AUTO: 0 10*3/UL
NRBC BLD AUTO-RTO: 0 /100
PLATELET # BLD AUTO: 97 10E9/L (ref 150–450)
POTASSIUM SERPL-SCNC: 3.9 MMOL/L (ref 3.4–5.3)
PROT SERPL-MCNC: 7.1 G/DL (ref 6.8–8.8)
RBC # BLD AUTO: 2.71 10E12/L (ref 3.8–5.2)
SODIUM SERPL-SCNC: 141 MMOL/L (ref 133–144)
WBC # BLD AUTO: 5.6 10E9/L (ref 4–11)

## 2020-09-18 PROCEDURE — 25000128 H RX IP 250 OP 636: Mod: ZF | Performed by: INTERNAL MEDICINE

## 2020-09-18 PROCEDURE — 85025 COMPLETE CBC W/AUTO DIFF WBC: CPT | Performed by: INTERNAL MEDICINE

## 2020-09-18 PROCEDURE — 96413 CHEMO IV INFUSION 1 HR: CPT

## 2020-09-18 PROCEDURE — 96375 TX/PRO/DX INJ NEW DRUG ADDON: CPT

## 2020-09-18 PROCEDURE — 96417 CHEMO IV INFUS EACH ADDL SEQ: CPT

## 2020-09-18 PROCEDURE — 25800030 ZZH RX IP 258 OP 636: Mod: ZF | Performed by: INTERNAL MEDICINE

## 2020-09-18 PROCEDURE — 80053 COMPREHEN METABOLIC PANEL: CPT | Performed by: INTERNAL MEDICINE

## 2020-09-18 RX ORDER — HEPARIN SODIUM (PORCINE) LOCK FLUSH IV SOLN 100 UNIT/ML 100 UNIT/ML
5 SOLUTION INTRAVENOUS
Status: DISCONTINUED | OUTPATIENT
Start: 2020-09-18 | End: 2020-09-18 | Stop reason: HOSPADM

## 2020-09-18 RX ORDER — HEPARIN SODIUM (PORCINE) LOCK FLUSH IV SOLN 100 UNIT/ML 100 UNIT/ML
5 SOLUTION INTRAVENOUS EVERY 8 HOURS
Status: DISCONTINUED | OUTPATIENT
Start: 2020-09-18 | End: 2020-09-18 | Stop reason: HOSPADM

## 2020-09-18 RX ADMIN — Medication 5 ML: at 14:53

## 2020-09-18 RX ADMIN — PERTUZUMAB 420 MG: 30 INJECTION, SOLUTION, CONCENTRATE INTRAVENOUS at 11:45

## 2020-09-18 RX ADMIN — SODIUM CHLORIDE 250 ML: 9 INJECTION, SOLUTION INTRAVENOUS at 10:53

## 2020-09-18 RX ADMIN — CARBOPLATIN 750 MG: 10 INJECTION, SOLUTION INTRAVENOUS at 14:02

## 2020-09-18 RX ADMIN — Medication 5 ML: at 10:15

## 2020-09-18 RX ADMIN — TRASTUZUMAB-ANNS 272 MG: 420 INJECTION, POWDER, LYOPHILIZED, FOR SOLUTION INTRAVENOUS at 12:21

## 2020-09-18 RX ADMIN — DOCETAXEL 108 MG: 20 INJECTION, SOLUTION, CONCENTRATE INTRAVENOUS at 12:55

## 2020-09-18 RX ADMIN — DEXAMETHASONE SODIUM PHOSPHATE: 10 INJECTION, SOLUTION INTRAMUSCULAR; INTRAVENOUS at 10:53

## 2020-09-18 ASSESSMENT — PAIN SCALES - GENERAL: PAINLEVEL: NO PAIN (0)

## 2020-09-18 NOTE — NURSING NOTE
Chief Complaint   Patient presents with     Port Draw     power needle, heparin locked, vitals checked     Jazmyn Peace RN on 9/18/2020 at 10:17 AM

## 2020-09-18 NOTE — PROGRESS NOTES
"Infusion Nursing Note:  Leah Rangel presents today for Cycle 4 Day 1 Pertuzumab, Trastuzumab-anns, Taxotere, and Carboplatin.    Patient seen by provider today: No   present during visit today: Not Applicable.    Note: Patient is \"feeling good\" today.  She had a virtual visit with Dr. Gerardo yesterday and reports no changes overnight.    Intravenous Access:  Implanted Port.    Treatment Conditions:  Lab Results   Component Value Date    HGB 10.5 09/18/2020     Lab Results   Component Value Date    WBC 5.6 09/18/2020      Lab Results   Component Value Date    ANEU 3.4 09/18/2020     Lab Results   Component Value Date    PLT 97 09/18/2020      Lab Results   Component Value Date     09/18/2020                   Lab Results   Component Value Date    POTASSIUM 3.9 09/18/2020           No results found for: MAG         Lab Results   Component Value Date    CR 0.58 09/18/2020                   Lab Results   Component Value Date    URVASHI 9.0 09/18/2020                Lab Results   Component Value Date    BILITOTAL 0.3 09/18/2020           Lab Results   Component Value Date    ALBUMIN 3.7 09/18/2020                    Lab Results   Component Value Date    ALT 21 09/18/2020           Lab Results   Component Value Date    AST 12 09/18/2020       Results reviewed, labs MET treatment parameters, ok to proceed with treatment.  ECHO/MUGA completed 7/13/20  EF 60-65%.    Post Infusion Assessment:  Patient tolerated infusion without incident.  Blood return noted pre and post infusion.  Site patent and intact, free from redness, edema or discomfort.  No evidence of extravasations.  Access discontinued per protocol.     Discharge Plan:   Prescription refills given for Dexamethasone.  Discharge instructions reviewed with: Patient.  Patient and/or family verbalized understanding of discharge instructions and all questions answered.  AVS to patient via [employer]Want[employer].  Patient will return 10/9/20 for next appointment. "   Patient discharged in stable condition accompanied by: self.  Departure Mode: Ambulatory.  Face to Face time: 0.    TAN MAYA RN

## 2020-09-21 RX ORDER — ALBUTEROL SULFATE 90 UG/1
1-2 AEROSOL, METERED RESPIRATORY (INHALATION)
Status: CANCELLED
Start: 2020-10-09

## 2020-09-21 RX ORDER — DIPHENHYDRAMINE HYDROCHLORIDE 50 MG/ML
50 INJECTION INTRAMUSCULAR; INTRAVENOUS
Status: CANCELLED
Start: 2020-10-09

## 2020-09-21 RX ORDER — LORAZEPAM 2 MG/ML
0.5 INJECTION INTRAMUSCULAR EVERY 4 HOURS PRN
Status: CANCELLED
Start: 2020-10-09

## 2020-09-21 RX ORDER — EPINEPHRINE 1 MG/ML
0.3 INJECTION, SOLUTION INTRAMUSCULAR; SUBCUTANEOUS EVERY 5 MIN PRN
Status: CANCELLED | OUTPATIENT
Start: 2020-10-09

## 2020-09-21 RX ORDER — NALOXONE HYDROCHLORIDE 0.4 MG/ML
.1-.4 INJECTION, SOLUTION INTRAMUSCULAR; INTRAVENOUS; SUBCUTANEOUS
Status: CANCELLED | OUTPATIENT
Start: 2020-10-09

## 2020-09-21 RX ORDER — HEPARIN SODIUM (PORCINE) LOCK FLUSH IV SOLN 100 UNIT/ML 100 UNIT/ML
5 SOLUTION INTRAVENOUS
Status: CANCELLED | OUTPATIENT
Start: 2020-10-09

## 2020-09-21 RX ORDER — ALBUTEROL SULFATE 0.83 MG/ML
2.5 SOLUTION RESPIRATORY (INHALATION)
Status: CANCELLED | OUTPATIENT
Start: 2020-10-09

## 2020-09-21 RX ORDER — MEPERIDINE HYDROCHLORIDE 25 MG/ML
25 INJECTION INTRAMUSCULAR; INTRAVENOUS; SUBCUTANEOUS EVERY 30 MIN PRN
Status: CANCELLED | OUTPATIENT
Start: 2020-10-09

## 2020-09-21 RX ORDER — ACETAMINOPHEN 325 MG/1
650 TABLET ORAL
Status: CANCELLED
Start: 2020-10-09

## 2020-09-21 RX ORDER — METHYLPREDNISOLONE SODIUM SUCCINATE 125 MG/2ML
125 INJECTION, POWDER, LYOPHILIZED, FOR SOLUTION INTRAMUSCULAR; INTRAVENOUS
Status: CANCELLED
Start: 2020-10-09

## 2020-09-21 RX ORDER — DIPHENHYDRAMINE HCL 25 MG
50 CAPSULE ORAL
Status: CANCELLED | OUTPATIENT
Start: 2020-10-09

## 2020-09-21 RX ORDER — HEPARIN SODIUM,PORCINE 10 UNIT/ML
5 VIAL (ML) INTRAVENOUS
Status: CANCELLED | OUTPATIENT
Start: 2020-10-09

## 2020-09-21 RX ORDER — SODIUM CHLORIDE 9 MG/ML
1000 INJECTION, SOLUTION INTRAVENOUS CONTINUOUS PRN
Status: CANCELLED
Start: 2020-10-09

## 2020-09-25 ENCOUNTER — PATIENT OUTREACH (OUTPATIENT)
Dept: ONCOLOGY | Facility: CLINIC | Age: 40
End: 2020-09-25

## 2020-09-25 DIAGNOSIS — C50.211 MALIGNANT NEOPLASM OF UPPER-INNER QUADRANT OF RIGHT BREAST IN FEMALE, ESTROGEN RECEPTOR POSITIVE (H): Primary | ICD-10-CM

## 2020-09-25 DIAGNOSIS — Z17.0 MALIGNANT NEOPLASM OF UPPER-INNER QUADRANT OF RIGHT BREAST IN FEMALE, ESTROGEN RECEPTOR POSITIVE (H): Primary | ICD-10-CM

## 2020-09-25 NOTE — PROGRESS NOTES
RN CARE COORDINATION NOTE      Incoming:  Voicemail message wanting to discuss the possibility of skipping cycles 5&6 of chemo and proceeding to surgery.       Outgoing: Spoke with Leah to discuss her questions. She has not tolerated chemo well and would prefer to move to surgery if that is an option. She reports significant decrease in tumor size on her examination.   Scheduled an US and follow up with Dr Gerardo for Monday 9/28 to discuss her options. Advised that she contact her surgical team to determine their soonest availability to help determine plan.         Rosita Ordonez MSN, RN, OCN  RN Care Coordinator  [facility]  [phone]

## 2020-09-28 ENCOUNTER — ONCOLOGY VISIT (OUTPATIENT)
Dept: ONCOLOGY | Facility: CLINIC | Age: 40
End: 2020-09-28
Attending: INTERNAL MEDICINE
Payer: COMMERCIAL

## 2020-09-28 ENCOUNTER — ANCILLARY PROCEDURE (OUTPATIENT)
Dept: MAMMOGRAPHY | Facility: CLINIC | Age: 40
End: 2020-09-28
Attending: INTERNAL MEDICINE
Payer: COMMERCIAL

## 2020-09-28 VITALS
SYSTOLIC BLOOD PRESSURE: 118 MMHG | RESPIRATION RATE: 16 BRPM | DIASTOLIC BLOOD PRESSURE: 80 MMHG | BODY MASS INDEX: 17.54 KG/M2 | WEIGHT: 102.2 LBS | OXYGEN SATURATION: 100 % | HEART RATE: 79 BPM

## 2020-09-28 DIAGNOSIS — C50.211 MALIGNANT NEOPLASM OF UPPER-INNER QUADRANT OF RIGHT BREAST IN FEMALE, ESTROGEN RECEPTOR POSITIVE (H): ICD-10-CM

## 2020-09-28 DIAGNOSIS — C50.211 MALIGNANT NEOPLASM OF UPPER-INNER QUADRANT OF RIGHT BREAST IN FEMALE, ESTROGEN RECEPTOR POSITIVE (H): Primary | ICD-10-CM

## 2020-09-28 DIAGNOSIS — Z17.0 MALIGNANT NEOPLASM OF UPPER-INNER QUADRANT OF RIGHT BREAST IN FEMALE, ESTROGEN RECEPTOR POSITIVE (H): ICD-10-CM

## 2020-09-28 DIAGNOSIS — Z17.0 MALIGNANT NEOPLASM OF UPPER-INNER QUADRANT OF RIGHT BREAST IN FEMALE, ESTROGEN RECEPTOR POSITIVE (H): Primary | ICD-10-CM

## 2020-09-28 PROCEDURE — G0463 HOSPITAL OUTPT CLINIC VISIT: HCPCS

## 2020-09-28 PROCEDURE — 99214 OFFICE O/P EST MOD 30 MIN: CPT | Mod: ZP | Performed by: INTERNAL MEDICINE

## 2020-09-28 NOTE — LETTER
9/28/2020         RE: Leah Rangel  [address] [place] [address]        Dear Colleague,    Thank you for referring your patient, Leah Rangel, to the [facility]. Please see a copy of my visit note below.    September 28, 2020     Reason for Visit: F/U for invasive ductal carcinoma of R breast, grade 3, ER positive, AZ positive, HER-2 positive, 3+ by IHC    Oncology HPI:   Leah Rangel is a  40 year old, premenopausal female, diagnosed with R breast cancer, T2Nx right breast cancer.      Leah reports that she has a history of bilateral implants.  She noticed a palpable mass involving her right breast in the upper inner quadrant in early 06/2020.  At that time, she went in for an evaluation with a mammogram and ultrasound.  Mammogram on 06/11/2020 revealed a 7 mm mass at the 1 o'clock position approximately 5 cm from the nipple in the upper inner quadrant of the right breast.  There was a second area measuring 6 x 5 x 4 mm at the 1 o'clock position 6 cm from the nipple.  In the left breast, there was an area of microcalcifications.  She was referred for a right breast biopsy.  Both areas at the 1 o'clock position were biopsied.  No lymph nodes were appreciated.  Both of these biopsies revealed an invasive ductal carcinoma, grade 3, ER positive, AZ positive, HER-2 positive, 3+ by IHC.  She  was considered to have a T1b multifocal N1 breast cancer.  On further review, it appeared to be T2Nx. She was subsequently referred for a breast MRI.  The BMRI done on 7/10/20 showed a 4.5 x 2.4 x 3 cm mass in the R upper, inner breast. She underwent a PET/CT 7/16 and started neoadjuvant treatment with Docetaxel/Carboplatin/Trastuzumab/Pertuzumab (TCHP) 7/17/20.  Lymph nodes appeared normal on MRI and PET.    Interval history:    Leah comes in today as an add on with increasing symptoms of nausea/vomiting and fatigue with her chemotherapy.  She is concerned about her weight loss, and  whether she needs to complete her chemotherapy.  She is concerned with how this is affecting her, and whether can recover from surgery based on her small weight.      -Reports diarrhea since first cycle of chemo. Reports she is taking 2 imodium then another after each episode. Reports 3-6 BM per day. Is improving. Reports it is not overly bothersome to her. Significantly improved from cycle 1    -Nausea post chemo is bad days 3-10 but then improves.    She typically eats a lot the last 10 days of each cycle given poor appetite the first few weeks.  This has been progressively getting worse though.    -Reports mood has been good, getting stronger, more independent.       -Reports breast mass is significantly decreased in size and hard to find now; she had an ultrasound earlier today and would like to discuss these results.    -Denies fever, chills, chest pain, dyspnea, abdominal pain, vomiting, and constipation    She has many questions regarding next steps.     ROS: 10 point ROS neg other than the symptoms noted above in the HPI.    Past Medical History:   Diagnosis Date     Breast cancer (H)      Complication of anesthesia      PONV (postoperative nausea and vomiting)         Current Outpatient Medications   Medication Sig Dispense Refill     dexamethasone (DECADRON) 4 MG tablet Take 2 tablets (8 mg) by mouth 2 times daily Evening AFTER Docetaxel and continue for 4 additional doses. 10 tablet 5     diazepam (VALIUM) 5 MG tablet Take one tablet one hour prior to scan. May repeat dose one time. 2 tablet 0     lidocaine (XYLOCAINE) 2 % external gel Apply topically 3 times daily as needed for moderate pain 30 mL 1     lidocaine-prilocaine (EMLA) 2.5-2.5 % external cream Apply topically as needed for moderate pain Apply to PORT site 45 minutes prior to procedure.  Cover with a non-absorbent dressing. 30 g 4     LORazepam (ATIVAN) 0.5 MG tablet Take 1 tablet (0.5 mg) by mouth every 4 hours as needed (Anxiety,  Nausea/Vomiting or Sleep) 30 tablet 5     magic mouthwash suspension, diphenhydrAMINE, lidocaine, aluminum-magnesium & simethicone, (FIRST-MOUTHWASH BLM) compounding kit Swish and swallow 5-10 mLs in mouth every 6 hours as needed for mouth sores 237 mL 1     methylphenidate (RITALIN) 5 MG tablet Take 0.5-1 tablets (2.5-5 mg) by mouth 2 times daily as needed (fatigue) Take no later than 2pm 60 tablet 0     morphine (MSIR) 15 MG IR tablet Take 0.5-1 tablets (7.5-15 mg) by mouth every 4 hours as needed for severe pain 45 tablet 0     ondansetron (ZOFRAN) 8 MG tablet Take 1 tablet (8 mg) by mouth every 8 hours as needed (Nausea/Vomiting) 30 tablet 3     order for DME Equipment: Wig  Diagnosis: cranial alopecia 1 each 0     predniSONE (DELTASONE) 20 MG tablet Take 1 tablet (20 mg) by mouth daily 5 tablet 0     prochlorperazine (COMPAZINE) 10 MG tablet Take 1 tablet (10 mg) by mouth every 6 hours as needed (Nausea/Vomiting) 30 tablet 5     Vaginal Lubricant (REPLENS) GEL Apply vaginally as needed 35 g 1          Allergies   Allergen Reactions     Codeine Other (See Comments)     Got hyper       Exam:  There were no vitals taken for this visit.  Wt Readings from Last 4 Encounters:   09/18/20 49.1 kg (108 lb 3.2 oz)   08/28/20 49 kg (108 lb)   08/07/20 47.5 kg (104 lb 12.8 oz)   07/23/20 44.5 kg (98 lb)   GENERAL: Healthy, alert and no distress  EYES: Eyes grossly normal to inspection.  No discharge or erythema, or obvious scleral/conjunctival abnormalities.  NECK: supple, no LAD  CV: regular  Lungs:  Clear, No audible wheeze, cough, or visible cyanosis.  No visible retractions or increased work of breathing.    Abd: soft, nt, nd + bs  Ext: no edema  BREASTS:  Unable to appreciate any discrete mass, there is a thickness in the left medial breast measuring about 1cm  SKIN: Visible skin clear. No significant rash, abnormal pigmentation or lesions.  NEURO: Cranial nerves grossly intact.  Mentation and speech appropriate for  age.  PSYCH: Mentation appears normal, affect normal/bright, judgement and insight intact, normal speech and appearance well-groomed.  Remainder of exam deferred due to the covid pandemic and video visit taking place.    Labs:       Lab Results   Component Value Date    WBC 5.6 09/18/2020     Lab Results   Component Value Date    RBC 2.71 09/18/2020     Lab Results   Component Value Date    HGB 10.5 09/18/2020     Lab Results   Component Value Date    HCT 30.8 09/18/2020     No components found for: MCT  Lab Results   Component Value Date     09/18/2020     Lab Results   Component Value Date    MCH 38.7 09/18/2020     Lab Results   Component Value Date    MCHC 34.1 09/18/2020     Lab Results   Component Value Date    RDW 16.6 09/18/2020     Lab Results   Component Value Date    PLT 97 09/18/2020       Recent Labs   Lab Test 09/18/20  1015 08/28/20  0849    142   POTASSIUM 3.9 3.9   CHLORIDE 107 108   CO2 27 28   ANIONGAP 7 5   GLC 94 116*   BUN 5* 5*   CR 0.58 0.59   URVASHI 9.0 8.6     Liver Function Studies -   Recent Labs   Lab Test 09/18/20  1015   PROTTOTAL 7.1   ALBUMIN 3.7   BILITOTAL 0.3   ALKPHOS 56   AST 12   ALT 21         Assessment/plan:   1.) ER/IN +, HER-2 positive R upper/inner quadrant breast cancer.   Clinically T2N0  -Leah really struggled with her TCHP chemotherapy.  She has a lot of nausea and weight loss.      In review of imaging with radiology today, her mass has completely resolved.  She still has calcifications present, but the mass has disappeared.    We discussed continuing six cycles of TCHP and then a herceptin based therapy to complete one year vs stopping TCHP early.  We reviewed her imaging that demonstrated an early stage 2 breast cancer.  Given her progressive weight loss and now complete response, it is reasonable to stop TCHP and move forward with surgery.      Last chemo will be 10/9.    I would like her to go back and see her surgeon and plastic surgeon in October.   On review of her imaging up front, she had a T2N0 lesion.  Nodes were negative by exam with Dr Vargas and by PET and MRI.  I do not anticipate she will need radiation.  I reviewed this with our radiology and radiation oncology team as well.    She is planning bilateral mastectomy and reconstruction.    She understands she will need adjuvant herceptin based therapy IV every three weeks to complete one year, as well as adjuvant endocrine therapy.       2.) Fatigue  -2/2 chemotherapy. Improves the second week after chemo though overall is worsening. Encouraged her to continue daily activity.      3.) Pain  -The pain in her right breast has greatly decreased, along with the size of her tumor. Uses tylenol for pain relief- feels well managed on this.     4.) Diarrhea  -Improved, down to 3-6 BM daily. Not bothersome to patient. Using imodium intermittently.   Continue to monitor.      5.) Nausea  -Improved, most notable days 4-10. Takes zofran 2-3 times per day on these days. Reports this makes it manageable. Does have compazine and ativan, has not tried them because she doesn't like to take lots of pills. Encouraged her to try compazine if needed    Lucita Gerardo MD

## 2020-09-28 NOTE — PROGRESS NOTES
September 28, 2020     Reason for Visit: F/U for invasive ductal carcinoma of R breast, grade 3, ER positive, KY positive, HER-2 positive, 3+ by IHC    Oncology HPI:   Leah Rangel is a  40 year old, premenopausal female, diagnosed with R breast cancer, T2Nx right breast cancer.      Leah reports that she has a history of bilateral implants.  She noticed a palpable mass involving her right breast in the upper inner quadrant in early 06/2020.  At that time, she went in for an evaluation with a mammogram and ultrasound.  Mammogram on 06/11/2020 revealed a 7 mm mass at the 1 o'clock position approximately 5 cm from the nipple in the upper inner quadrant of the right breast.  There was a second area measuring 6 x 5 x 4 mm at the 1 o'clock position 6 cm from the nipple.  In the left breast, there was an area of microcalcifications.  She was referred for a right breast biopsy.  Both areas at the 1 o'clock position were biopsied.  No lymph nodes were appreciated.  Both of these biopsies revealed an invasive ductal carcinoma, grade 3, ER positive, KY positive, HER-2 positive, 3+ by IHC.  She  was considered to have a T1b multifocal N1 breast cancer.  On further review, it appeared to be T2Nx. She was subsequently referred for a breast MRI.  The BMRI done on 7/10/20 showed a 4.5 x 2.4 x 3 cm mass in the R upper, inner breast. She underwent a PET/CT 7/16 and started neoadjuvant treatment with Docetaxel/Carboplatin/Trastuzumab/Pertuzumab (TCHP) 7/17/20.  Lymph nodes appeared normal on MRI and PET.    Interval history:    Leah comes in today as an add on with increasing symptoms of nausea/vomiting and fatigue with her chemotherapy.  She is concerned about her weight loss, and whether she needs to complete her chemotherapy.  She is concerned with how this is affecting her, and whether can recover from surgery based on her small weight.      -Reports diarrhea since first cycle of chemo. Reports she is taking 2 imodium then  another after each episode. Reports 3-6 BM per day. Is improving. Reports it is not overly bothersome to her. Significantly improved from cycle 1    -Nausea post chemo is bad days 3-10 but then improves.    She typically eats a lot the last 10 days of each cycle given poor appetite the first few weeks.  This has been progressively getting worse though.    -Reports mood has been good, getting stronger, more independent.       -Reports breast mass is significantly decreased in size and hard to find now; she had an ultrasound earlier today and would like to discuss these results.    -Denies fever, chills, chest pain, dyspnea, abdominal pain, vomiting, and constipation    She has many questions regarding next steps.     ROS: 10 point ROS neg other than the symptoms noted above in the HPI.    Past Medical History:   Diagnosis Date     Breast cancer (H)      Complication of anesthesia      PONV (postoperative nausea and vomiting)         Current Outpatient Medications   Medication Sig Dispense Refill     dexamethasone (DECADRON) 4 MG tablet Take 2 tablets (8 mg) by mouth 2 times daily Evening AFTER Docetaxel and continue for 4 additional doses. 10 tablet 5     diazepam (VALIUM) 5 MG tablet Take one tablet one hour prior to scan. May repeat dose one time. 2 tablet 0     lidocaine (XYLOCAINE) 2 % external gel Apply topically 3 times daily as needed for moderate pain 30 mL 1     lidocaine-prilocaine (EMLA) 2.5-2.5 % external cream Apply topically as needed for moderate pain Apply to PORT site 45 minutes prior to procedure.  Cover with a non-absorbent dressing. 30 g 4     LORazepam (ATIVAN) 0.5 MG tablet Take 1 tablet (0.5 mg) by mouth every 4 hours as needed (Anxiety, Nausea/Vomiting or Sleep) 30 tablet 5     magic mouthwash suspension, diphenhydrAMINE, lidocaine, aluminum-magnesium & simethicone, (FIRST-MOUTHWASH BLM) compounding kit Swish and swallow 5-10 mLs in mouth every 6 hours as needed for mouth sores 237 mL 1      methylphenidate (RITALIN) 5 MG tablet Take 0.5-1 tablets (2.5-5 mg) by mouth 2 times daily as needed (fatigue) Take no later than 2pm 60 tablet 0     morphine (MSIR) 15 MG IR tablet Take 0.5-1 tablets (7.5-15 mg) by mouth every 4 hours as needed for severe pain 45 tablet 0     ondansetron (ZOFRAN) 8 MG tablet Take 1 tablet (8 mg) by mouth every 8 hours as needed (Nausea/Vomiting) 30 tablet 3     order for DME Equipment: Wig  Diagnosis: cranial alopecia 1 each 0     predniSONE (DELTASONE) 20 MG tablet Take 1 tablet (20 mg) by mouth daily 5 tablet 0     prochlorperazine (COMPAZINE) 10 MG tablet Take 1 tablet (10 mg) by mouth every 6 hours as needed (Nausea/Vomiting) 30 tablet 5     Vaginal Lubricant (REPLENS) GEL Apply vaginally as needed 35 g 1          Allergies   Allergen Reactions     Codeine Other (See Comments)     Got hyper       Exam:  There were no vitals taken for this visit.  Wt Readings from Last 4 Encounters:   09/18/20 49.1 kg (108 lb 3.2 oz)   08/28/20 49 kg (108 lb)   08/07/20 47.5 kg (104 lb 12.8 oz)   07/23/20 44.5 kg (98 lb)   GENERAL: Healthy, alert and no distress  EYES: Eyes grossly normal to inspection.  No discharge or erythema, or obvious scleral/conjunctival abnormalities.  NECK: supple, no LAD  CV: regular  Lungs:  Clear, No audible wheeze, cough, or visible cyanosis.  No visible retractions or increased work of breathing.    Abd: soft, nt, nd + bs  Ext: no edema  BREASTS:  Unable to appreciate any discrete mass, there is a thickness in the left medial breast measuring about 1cm  SKIN: Visible skin clear. No significant rash, abnormal pigmentation or lesions.  NEURO: Cranial nerves grossly intact.  Mentation and speech appropriate for age.  PSYCH: Mentation appears normal, affect normal/bright, judgement and insight intact, normal speech and appearance well-groomed.  Remainder of exam deferred due to the covid pandemic and video visit taking place.    Labs:       Lab Results   Component  Value Date    WBC 5.6 09/18/2020     Lab Results   Component Value Date    RBC 2.71 09/18/2020     Lab Results   Component Value Date    HGB 10.5 09/18/2020     Lab Results   Component Value Date    HCT 30.8 09/18/2020     No components found for: MCT  Lab Results   Component Value Date     09/18/2020     Lab Results   Component Value Date    MCH 38.7 09/18/2020     Lab Results   Component Value Date    MCHC 34.1 09/18/2020     Lab Results   Component Value Date    RDW 16.6 09/18/2020     Lab Results   Component Value Date    PLT 97 09/18/2020       Recent Labs   Lab Test 09/18/20  1015 08/28/20  0849    142   POTASSIUM 3.9 3.9   CHLORIDE 107 108   CO2 27 28   ANIONGAP 7 5   GLC 94 116*   BUN 5* 5*   CR 0.58 0.59   URVASHI 9.0 8.6     Liver Function Studies -   Recent Labs   Lab Test 09/18/20  1015   PROTTOTAL 7.1   ALBUMIN 3.7   BILITOTAL 0.3   ALKPHOS 56   AST 12   ALT 21         Assessment/plan:   1.) ER/MO +, HER-2 positive R upper/inner quadrant breast cancer.   Clinically T2N0  -Leah really struggled with her TCHP chemotherapy.  She has a lot of nausea and weight loss.      In review of imaging with radiology today, her mass has completely resolved.  She still has calcifications present, but the mass has disappeared.    We discussed continuing six cycles of TCHP and then a herceptin based therapy to complete one year vs stopping TCHP early.  We reviewed her imaging that demonstrated an early stage 2 breast cancer.  Given her progressive weight loss and now complete response, it is reasonable to stop TCHP and move forward with surgery.      Last chemo will be 10/9.    I would like her to go back and see her surgeon and plastic surgeon in October.  On review of her imaging up front, she had a T2N0 lesion.  Nodes were negative by exam with Dr Vargas and by PET and MRI.  I do not anticipate she will need radiation.  I reviewed this with our radiology and radiation oncology team as well.    She is  planning bilateral mastectomy and reconstruction.    She understands she will need adjuvant herceptin based therapy IV every three weeks to complete one year, as well as adjuvant endocrine therapy.       2.) Fatigue  -2/2 chemotherapy. Improves the second week after chemo though overall is worsening. Encouraged her to continue daily activity.      3.) Pain  -The pain in her right breast has greatly decreased, along with the size of her tumor. Uses tylenol for pain relief- feels well managed on this.     4.) Diarrhea  -Improved, down to 3-6 BM daily. Not bothersome to patient. Using imodium intermittently.   Continue to monitor.      5.) Nausea  -Improved, most notable days 4-10. Takes zofran 2-3 times per day on these days. Reports this makes it manageable. Does have compazine and ativan, has not tried them because she doesn't like to take lots of pills. Encouraged her to try compazine if needed    Lucita Gerardo MD

## 2020-09-28 NOTE — NURSING NOTE
"Oncology Rooming Note    September 28, 2020 4:04 PM   Leah Rangel is a 40 year old female who presents for:    Chief Complaint   Patient presents with     Oncology Clinic Visit     Malignant neoplasm of upper-inner quadrant of right breast in female, estrogen receptor positive (H)     Initial Vitals: Blood Pressure 118/80   Pulse 79   Respiration 16   Weight 46.4 kg (102 lb 3.2 oz)   Oxygen Saturation 100%   Body Mass Index 17.54 kg/m   Estimated body mass index is 17.54 kg/m  as calculated from the following:    Height as of 7/17/20: 1.626 m (5' 4\").    Weight as of this encounter: 46.4 kg (102 lb 3.2 oz). Body surface area is 1.45 meters squared.  Data Unavailable Comment: Data Unavailable   No LMP recorded.  Allergies reviewed: Yes  Medications reviewed: Yes    Medications: Medication refills not needed today.  Pharmacy name entered into [employer] [facility]: [id number] - [place], [place] - [address] AT [facility] [address]     Clinical concerns: none       Geri Bowles MA            "

## 2020-10-06 NOTE — PROGRESS NOTES
"Leah Rangel is a 40 year old female who is being evaluated via a billable video visit.      The patient has been notified of following:     \"This video visit will be conducted via a call between you and your physician/provider. We have found that certain health care needs can be provided without the need for an in-person physical exam.  This service lets us provide the care you need with a video conversation.  If a prescription is necessary we can send it directly to your pharmacy.  If lab work is needed we can place an order for that and you can then stop by our lab to have the test done at a later time.    Video visits are billed at different rates depending on your insurance coverage.  Please reach out to your insurance provider with any questions.    If during the course of the call the physician/provider feels a video visit is not appropriate, you will not be charged for this service.\"    Patient has given verbal consent for Video visit? Yes    How would you like to obtain your AVS? MyChart    If you are dropped from the video visit, the video invite should be resent to: Text to cell phone: [phone]    Will anyone else be joining your video visit? No         I have reviewed and updated the patient's allergies and medication list.    Concerns: No new concerns.   Refills: None needed.     Vitals - Patient Reported  Weight (Patient Reported): 46.3 kg (102 lb)  Height (Patient Reported): 162.6 cm (5' 4\")  BMI (Based on Pt Reported Ht/Wt): 17.51  Pain Score: No Pain (0)    Katelynn Carmichael CMA    Video-Visit Details    Type of service:  Video Visit    Video Start Time: 2:21 PM  Video End Time: 2:57 PM    Originating Location (pt. Location): Home    Distant Location (provider location):  [facility] CANCER [facility]     Platform used for Video Visit: Lucio Arredondo PA-C      Oncology/Hematology Visit Note  Oct 7, 2020    Reason for Visit: Follow up of invasive ductal carcinoma of R breast, " grade 3, ER positive, MN positive, HER-2 positive, 3+ by IHC    History of Present Illness:   Leah Rangel is a  40 year old, premenopausal female, diagnosed with R breast cancer, T2Nx right breast cancer.      Leah reports that she has a history of bilateral implants.  She noticed a palpable mass involving her right breast in the upper inner quadrant in early 06/2020.  At that time, she went in for an evaluation with a mammogram and ultrasound.  Mammogram on 06/11/2020 revealed a 7 mm mass at the 1 o'clock position approximately 5 cm from the nipple in the upper inner quadrant of the right breast.  There was a second area measuring 6 x 5 x 4 mm at the 1 o'clock position 6 cm from the nipple.  In the left breast, there was an area of microcalcifications.  She was referred for a right breast biopsy.  Both areas at the 1 o'clock position were biopsied.  No lymph nodes were appreciated.  Both of these biopsies revealed an invasive ductal carcinoma, grade 3, ER positive, MN positive, HER-2 positive, 3+ by IHC.  She  was considered to have a T1b multifocal N1 breast cancer.  On further review, it appeared to be T2Nx. She was subsequently referred for a breast MRI.  The BMRI done on 7/10/20 showed a 4.5 x 2.4 x 3 cm mass in the R upper, inner breast. She underwent a PET/CT 7/16 and started neoadjuvant treatment with Docetaxel/Carboplatin/Trastuzumab/Pertuzumab (TCHP) 7/17/20.  Lymph nodes appeared normal on MRI and PET.    Interval History:  Leah Rangel was met with for follow up. Overall feeling okay today but is expecting a rough week after her next dose of chemo.   - She gets fatigue after her cycles, but this tends to improve with time. She is noting a progressive residual fatigue. Still able to perform ADLs.  - Nausea and diarrhea have resolved at time of visit. She is not interested in imodium as she feels her imodium helps to control diarrhea when she has it.   - She continue to try to keep weight on.    - She met with her surgeons this week and surgery is scheduled for 11/12. Admits to anxiety. She has been taking ativan at nighttime that helps with both anxiety and sleep. She also uses tylenol PM.   - She wonders if she can move her echo up as she does not want it so close after her surgery.   - Notes occasional nose bleeds. This has been occurring since starting treatment. No blood in urine or stools. No new urinary symptoms.   - Notes neuropathy in her fingers following treatments, but this resolves prior to her next cycle. She does not have it today.   -Denies fever, chills, chest pain, dyspnea, cough, chest pain     ROS: 10 point ROS neg other than the symptoms noted above in the HPI.    Current Outpatient Medications   Medication Sig Dispense Refill     dexamethasone (DECADRON) 4 MG tablet Take 2 tablets (8 mg) by mouth 2 times daily Evening AFTER Docetaxel and continue for 4 additional doses. 10 tablet 5     diazepam (VALIUM) 5 MG tablet Take one tablet one hour prior to scan. May repeat dose one time. 2 tablet 0     lidocaine (XYLOCAINE) 2 % external gel Apply topically 3 times daily as needed for moderate pain 30 mL 1     lidocaine-prilocaine (EMLA) 2.5-2.5 % external cream Apply topically as needed for moderate pain Apply to PORT site 45 minutes prior to procedure.  Cover with a non-absorbent dressing. 30 g 4     LORazepam (ATIVAN) 0.5 MG tablet Take 1 tablet (0.5 mg) by mouth every 4 hours as needed (Anxiety, Nausea/Vomiting or Sleep) 30 tablet 5     magic mouthwash suspension, diphenhydrAMINE, lidocaine, aluminum-magnesium & simethicone, (FIRST-MOUTHWASH BLM) compounding kit Swish and swallow 5-10 mLs in mouth every 6 hours as needed for mouth sores 237 mL 1     methylphenidate (RITALIN) 5 MG tablet Take 0.5-1 tablets (2.5-5 mg) by mouth 2 times daily as needed (fatigue) Take no later than 2pm 60 tablet 0     morphine (MSIR) 15 MG IR tablet Take 0.5-1 tablets (7.5-15 mg) by mouth every 4 hours as  needed for severe pain 45 tablet 0     ondansetron (ZOFRAN) 8 MG tablet Take 1 tablet (8 mg) by mouth every 8 hours as needed (Nausea/Vomiting) 30 tablet 3     order for DME Equipment: Wig  Diagnosis: cranial alopecia 1 each 0     predniSONE (DELTASONE) 20 MG tablet Take 1 tablet (20 mg) by mouth daily 5 tablet 0     prochlorperazine (COMPAZINE) 10 MG tablet Take 1 tablet (10 mg) by mouth every 6 hours as needed (Nausea/Vomiting) 30 tablet 5     Vaginal Lubricant (REPLENS) GEL Apply vaginally as needed 35 g 1       Physical Examination:  There were no vitals taken for this visit.  Wt Readings from Last 10 Encounters:   09/28/20 46.4 kg (102 lb 3.2 oz)   09/18/20 49.1 kg (108 lb 3.2 oz)   08/28/20 49 kg (108 lb)   08/07/20 47.5 kg (104 lb 12.8 oz)   07/23/20 44.5 kg (98 lb)   07/17/20 46.7 kg (102 lb 13.6 oz)   07/15/20 45.8 kg (101 lb)   07/15/20 45.4 kg (100 lb)   07/09/20 51.7 kg (114 lb)   07/14/04 44.9 kg (99 lb)     Video physical exam  General: Patient appears well in no acute distress. Normal body habitus.  Skin: No visualized rash or lesions on visualized skin  Eyes: EOMI, no erythema, sclera icterus or discharge noted  Resp: Appears to be breathing comfortably without accessory muscle usage, speaking in full sentences, no cough  MSK: Appears to have normal range of motion based on visualized movements  Neurologic: No apparent tremors, facial movements symmetric  Psych: affect normal, alert and oriented    The rest of a comprehensive physical examination is deferred due to PHE (public health emergency) video restrictions    Laboratory Data:  To be drawn later this week      Assessment and Plan:  1.) ER/NC +, HER-2 positive R upper/inner quadrant breast cancer.   Clinically T2N0  - Started TCHP chemotherapy 8/6/2020- She has been having issues with nausea, diarrhea, and weight loss.  S/p 4 cycles.   - Dr. Gerardo reviewed US 9/28 which shows the mass has completely resolved.  She still has calcifications  present, but the mass has disappeared.  - Together it was decided that they complete 5 cycles of TCHP. For her 6th cycle, she will have herceptin/perjeta alone. Further maintenance will be decided on path from surgery. She understands she will need adjuvant herceptin based therapy IV every three weeks to complete one year, as well as adjuvant endocrine therapy.     - She is planning bilateral mastectomy 11/12 and reconstruction.     2.) Fatigue  -2/2 chemotherapy.  Encouraged her to continue daily activity.      3.) Pain  -The pain in her right breast has greatly decreased, along with the size of her tumor. Uses tylenol for pain relief- feels well managed on this.      4.) Diarrhea  - Reports that the 6 days following her infusion this is worse. Has up to 6 per day. Overall she feels it is controlled with imodium- takes about 6 per day. Discussed lomotil but she is not interested in any additional antidiarrheal medications at this time.      5.) Nausea  -Improved, most notable days 4-10. Takes zofran 2-3 times per day on these days, advised to take these scheduled for at least the first 4 days. Does have compazine and ativan as needed as well.     6) Anxiety, insomnia. Taking ativan at night to to help with both. Interested in more natural sleep aides once she is able to get through chemo and surgery. Will continue to address with time.     7) Mild neuropathy in hands, resolved prior to next chemo dose. No indication for dose reduction at this time.     8) Mild epistaxis, advised supportive cares. Trial bedside humidifier.     Bertha Arredondo PA-C  [facility]  [address] [place] [address]  [phone]

## 2020-10-07 ENCOUNTER — MYC MEDICAL ADVICE (OUTPATIENT)
Dept: ONCOLOGY | Facility: CLINIC | Age: 40
End: 2020-10-07

## 2020-10-07 ENCOUNTER — VIRTUAL VISIT (OUTPATIENT)
Dept: ONCOLOGY | Facility: CLINIC | Age: 40
End: 2020-10-07
Attending: PHYSICIAN ASSISTANT
Payer: COMMERCIAL

## 2020-10-07 DIAGNOSIS — Z17.0 MALIGNANT NEOPLASM OF UPPER-INNER QUADRANT OF RIGHT BREAST IN FEMALE, ESTROGEN RECEPTOR POSITIVE (H): Primary | ICD-10-CM

## 2020-10-07 DIAGNOSIS — C50.211 MALIGNANT NEOPLASM OF UPPER-INNER QUADRANT OF RIGHT BREAST IN FEMALE, ESTROGEN RECEPTOR POSITIVE (H): Primary | ICD-10-CM

## 2020-10-07 PROCEDURE — 999N001193 HC VIDEO/TELEPHONE VISIT; NO CHARGE

## 2020-10-07 PROCEDURE — 99214 OFFICE O/P EST MOD 30 MIN: CPT | Mod: 95 | Performed by: PHYSICIAN ASSISTANT

## 2020-10-07 NOTE — LETTER
"    10/7/2020         RE: Leah Rangel  [address] [place] [address]        Dear Colleague,    Thank you for referring your patient, Leah Rangel, to the [facility]. Please see a copy of my visit note below.    Leah Rangel is a 40 year old female who is being evaluated via a billable video visit.      The patient has been notified of following:     \"This video visit will be conducted via a call between you and your physician/provider. We have found that certain health care needs can be provided without the need for an in-person physical exam.  This service lets us provide the care you need with a video conversation.  If a prescription is necessary we can send it directly to your pharmacy.  If lab work is needed we can place an order for that and you can then stop by our lab to have the test done at a later time.    Video visits are billed at different rates depending on your insurance coverage.  Please reach out to your insurance provider with any questions.    If during the course of the call the physician/provider feels a video visit is not appropriate, you will not be charged for this service.\"    Patient has given verbal consent for Video visit? Yes    How would you like to obtain your AVS? MyChart    If you are dropped from the video visit, the video invite should be resent to: Text to cell phone: [phone]    Will anyone else be joining your video visit? No         I have reviewed and updated the patient's allergies and medication list.    Concerns: No new concerns.   Refills: None needed.     Vitals - Patient Reported  Weight (Patient Reported): 46.3 kg (102 lb)  Height (Patient Reported): 162.6 cm (5' 4\")  BMI (Based on Pt Reported Ht/Wt): 17.51  Pain Score: No Pain (0)    Katelynn Carmichael CMA    Video-Visit Details    Type of service:  Video Visit    Video Start Time: 2:21 PM  Video End Time: 2:57 PM    Originating Location (pt. Location): Home    Distant " Location (provider location):  [facility] CANCER [facility]     Platform used for Video Visit: Lucio Arredondo PA-C      Oncology/Hematology Visit Note  Oct 7, 2020    Reason for Visit: Follow up of invasive ductal carcinoma of R breast, grade 3, ER positive, [place] positive, HER-2 positive, 3+ by IHC    History of Present Illness:   Leah Rangel is a  40 year old, premenopausal female, diagnosed with R breast cancer, T2Nx right breast cancer.      Leah reports that she has a history of bilateral implants.  She noticed a palpable mass involving her right breast in the upper inner quadrant in early 06/2020.  At that time, she went in for an evaluation with a mammogram and ultrasound.  Mammogram on 06/11/2020 revealed a 7 mm mass at the 1 o'clock position approximately 5 cm from the nipple in the upper inner quadrant of the right breast.  There was a second area measuring 6 x 5 x 4 mm at the 1 o'clock position 6 cm from the nipple.  In the left breast, there was an area of microcalcifications.  She was referred for a right breast biopsy.  Both areas at the 1 o'clock position were biopsied.  No lymph nodes were appreciated.  Both of these biopsies revealed an invasive ductal carcinoma, grade 3, ER positive, NC positive, HER-2 positive, 3+ by IHC.  She  was considered to have a T1b multifocal N1 breast cancer.  On further review, it appeared to be T2Nx. She was subsequently referred for a breast MRI.  The BMRI done on 7/10/20 showed a 4.5 x 2.4 x 3 cm mass in the R upper, inner breast. She underwent a PET/CT 7/16 and started neoadjuvant treatment with Docetaxel/Carboplatin/Trastuzumab/Pertuzumab (TCHP) 7/17/20.  Lymph nodes appeared normal on MRI and PET.    Interval History:  Leah Rangel was met with for follow up. Overall feeling okay today but is expecting a rough week after her next dose of chemo.   - She gets fatigue after her cycles, but this tends to improve with time. She is noting a  progressive residual fatigue. Still able to perform ADLs.  - Nausea and diarrhea have resolved at time of visit. She is not interested in imodium as she feels her imodium helps to control diarrhea when she has it.   - She continue to try to keep weight on.   - She met with her surgeons this week and surgery is scheduled for 11/12. Admits to anxiety. She has been taking ativan at nighttime that helps with both anxiety and sleep. She also uses tylenol PM.   - She wonders if she can move her echo up as she does not want it so close after her surgery.   - Notes occasional nose bleeds. This has been occurring since starting treatment. No blood in urine or stools. No new urinary symptoms.   - Notes neuropathy in her fingers following treatments, but this resolves prior to her next cycle. She does not have it today.   -Denies fever, chills, chest pain, dyspnea, cough, chest pain     ROS: 10 point ROS neg other than the symptoms noted above in the HPI.    Current Outpatient Medications   Medication Sig Dispense Refill     dexamethasone (DECADRON) 4 MG tablet Take 2 tablets (8 mg) by mouth 2 times daily Evening AFTER Docetaxel and continue for 4 additional doses. 10 tablet 5     diazepam (VALIUM) 5 MG tablet Take one tablet one hour prior to scan. May repeat dose one time. 2 tablet 0     lidocaine (XYLOCAINE) 2 % external gel Apply topically 3 times daily as needed for moderate pain 30 mL 1     lidocaine-prilocaine (EMLA) 2.5-2.5 % external cream Apply topically as needed for moderate pain Apply to PORT site 45 minutes prior to procedure.  Cover with a non-absorbent dressing. 30 g 4     LORazepam (ATIVAN) 0.5 MG tablet Take 1 tablet (0.5 mg) by mouth every 4 hours as needed (Anxiety, Nausea/Vomiting or Sleep) 30 tablet 5     magic mouthwash suspension, diphenhydrAMINE, lidocaine, aluminum-magnesium & simethicone, (FIRST-MOUTHWASH BLM) compounding kit Swish and swallow 5-10 mLs in mouth every 6 hours as needed for mouth sores  237 mL 1     methylphenidate (RITALIN) 5 MG tablet Take 0.5-1 tablets (2.5-5 mg) by mouth 2 times daily as needed (fatigue) Take no later than 2pm 60 tablet 0     morphine (MSIR) 15 MG IR tablet Take 0.5-1 tablets (7.5-15 mg) by mouth every 4 hours as needed for severe pain 45 tablet 0     ondansetron (ZOFRAN) 8 MG tablet Take 1 tablet (8 mg) by mouth every 8 hours as needed (Nausea/Vomiting) 30 tablet 3     order for DME Equipment: Wig  Diagnosis: cranial alopecia 1 each 0     predniSONE (DELTASONE) 20 MG tablet Take 1 tablet (20 mg) by mouth daily 5 tablet 0     prochlorperazine (COMPAZINE) 10 MG tablet Take 1 tablet (10 mg) by mouth every 6 hours as needed (Nausea/Vomiting) 30 tablet 5     Vaginal Lubricant (REPLENS) GEL Apply vaginally as needed 35 g 1       Physical Examination:  There were no vitals taken for this visit.  Wt Readings from Last 10 Encounters:   09/28/20 46.4 kg (102 lb 3.2 oz)   09/18/20 49.1 kg (108 lb 3.2 oz)   08/28/20 49 kg (108 lb)   08/07/20 47.5 kg (104 lb 12.8 oz)   07/23/20 44.5 kg (98 lb)   07/17/20 46.7 kg (102 lb 13.6 oz)   07/15/20 45.8 kg (101 lb)   07/15/20 45.4 kg (100 lb)   07/09/20 51.7 kg (114 lb)   07/14/04 44.9 kg (99 lb)     Video physical exam  General: Patient appears well in no acute distress. Normal body habitus.  Skin: No visualized rash or lesions on visualized skin  Eyes: EOMI, no erythema, sclera icterus or discharge noted  Resp: Appears to be breathing comfortably without accessory muscle usage, speaking in full sentences, no cough  MSK: Appears to have normal range of motion based on visualized movements  Neurologic: No apparent tremors, facial movements symmetric  Psych: affect normal, alert and oriented    The rest of a comprehensive physical examination is deferred due to PHE (public health emergency) video restrictions    Laboratory Data:  To be drawn later this week      Assessment and Plan:  1.) ER/AR +, HER-2 positive R upper/inner quadrant breast cancer.    Clinically T2N0  - Started TCHP chemotherapy 8/6/2020- She has been having issues with nausea, diarrhea, and weight loss.  S/p 4 cycles.   - Dr. Gerardo reviewed US 9/28 which shows the mass has completely resolved.  She still has calcifications present, but the mass has disappeared.  - Together it was decided that they complete 5 cycles of TCHP. For her 6th cycle, she will have herceptin/perjeta alone. Further maintenance will be decided on path from surgery. She understands she will need adjuvant herceptin based therapy IV every three weeks to complete one year, as well as adjuvant endocrine therapy.     - She is planning bilateral mastectomy 11/12 and reconstruction.     2.) Fatigue  -2/2 chemotherapy.  Encouraged her to continue daily activity.      3.) Pain  -The pain in her right breast has greatly decreased, along with the size of her tumor. Uses tylenol for pain relief- feels well managed on this.      4.) Diarrhea  - Reports that the 6 days following her infusion this is worse. Has up to 6 per day. Overall she feels it is controlled with imodium- takes about 6 per day. Discussed lomotil but she is not interested in any additional antidiarrheal medications at this time.      5.) Nausea  -Improved, most notable days 4-10. Takes zofran 2-3 times per day on these days, advised to take these scheduled for at least the first 4 days. Does have compazine and ativan as needed as well.     6) Anxiety, insomnia. Taking ativan at night to to help with both. Interested in more natural sleep aides once she is able to get through chemo and surgery. Will continue to address with time.     7) Mild neuropathy in hands, resolved prior to next chemo dose. No indication for dose reduction at this time.     8) Mild epistaxis, advised supportive cares. Trial bedside humidifier.     Bertha Arredondo PA-C  [facility]  [address] [place] [address] [phone]

## 2020-10-09 ENCOUNTER — APPOINTMENT (OUTPATIENT)
Dept: LAB | Facility: CLINIC | Age: 40
End: 2020-10-09
Attending: INTERNAL MEDICINE
Payer: COMMERCIAL

## 2020-10-09 ENCOUNTER — INFUSION THERAPY VISIT (OUTPATIENT)
Dept: ONCOLOGY | Facility: CLINIC | Age: 40
End: 2020-10-09
Attending: INTERNAL MEDICINE
Payer: COMMERCIAL

## 2020-10-09 VITALS
HEART RATE: 95 BPM | SYSTOLIC BLOOD PRESSURE: 117 MMHG | BODY MASS INDEX: 17.85 KG/M2 | RESPIRATION RATE: 18 BRPM | OXYGEN SATURATION: 97 % | TEMPERATURE: 98.2 F | DIASTOLIC BLOOD PRESSURE: 73 MMHG | WEIGHT: 104 LBS

## 2020-10-09 DIAGNOSIS — Z17.0 MALIGNANT NEOPLASM OF UPPER-INNER QUADRANT OF RIGHT BREAST IN FEMALE, ESTROGEN RECEPTOR POSITIVE (H): Primary | ICD-10-CM

## 2020-10-09 DIAGNOSIS — D75.89 MACROCYTOSIS: ICD-10-CM

## 2020-10-09 DIAGNOSIS — C50.211 MALIGNANT NEOPLASM OF UPPER-INNER QUADRANT OF RIGHT BREAST IN FEMALE, ESTROGEN RECEPTOR POSITIVE (H): Primary | ICD-10-CM

## 2020-10-09 LAB
ALBUMIN SERPL-MCNC: 3.8 G/DL (ref 3.4–5)
ALP SERPL-CCNC: 57 U/L (ref 40–150)
ALT SERPL W P-5'-P-CCNC: 22 U/L (ref 0–50)
ANION GAP SERPL CALCULATED.3IONS-SCNC: 4 MMOL/L (ref 3–14)
AST SERPL W P-5'-P-CCNC: 16 U/L (ref 0–45)
BASOPHILS # BLD AUTO: 0 10E9/L (ref 0–0.2)
BASOPHILS NFR BLD AUTO: 0.3 %
BILIRUB SERPL-MCNC: 0.5 MG/DL (ref 0.2–1.3)
BUN SERPL-MCNC: 9 MG/DL (ref 7–30)
CALCIUM SERPL-MCNC: 8.9 MG/DL (ref 8.5–10.1)
CHLORIDE SERPL-SCNC: 106 MMOL/L (ref 94–109)
CO2 SERPL-SCNC: 29 MMOL/L (ref 20–32)
CREAT SERPL-MCNC: 0.57 MG/DL (ref 0.52–1.04)
DIFFERENTIAL METHOD BLD: ABNORMAL
EOSINOPHIL # BLD AUTO: 0 10E9/L (ref 0–0.7)
EOSINOPHIL NFR BLD AUTO: 0.2 %
ERYTHROCYTE [DISTWIDTH] IN BLOOD BY AUTOMATED COUNT: 17.4 % (ref 10–15)
GFR SERPL CREATININE-BSD FRML MDRD: >90 ML/MIN/{1.73_M2}
GLUCOSE SERPL-MCNC: 99 MG/DL (ref 70–99)
HCT VFR BLD AUTO: 28.5 % (ref 35–47)
HGB BLD-MCNC: 9.9 G/DL (ref 11.7–15.7)
IMM GRANULOCYTES # BLD: 0 10E9/L (ref 0–0.4)
IMM GRANULOCYTES NFR BLD: 0.3 %
LYMPHOCYTES # BLD AUTO: 2.1 10E9/L (ref 0.8–5.3)
LYMPHOCYTES NFR BLD AUTO: 35 %
MCH RBC QN AUTO: 40.2 PG (ref 26.5–33)
MCHC RBC AUTO-ENTMCNC: 34.7 G/DL (ref 31.5–36.5)
MCV RBC AUTO: 116 FL (ref 78–100)
MONOCYTES # BLD AUTO: 0.3 10E9/L (ref 0–1.3)
MONOCYTES NFR BLD AUTO: 5.2 %
NEUTROPHILS # BLD AUTO: 3.5 10E9/L (ref 1.6–8.3)
NEUTROPHILS NFR BLD AUTO: 59 %
NRBC # BLD AUTO: 0 10*3/UL
NRBC BLD AUTO-RTO: 0 /100
PLATELET # BLD AUTO: 91 10E9/L (ref 150–450)
POTASSIUM SERPL-SCNC: 3.7 MMOL/L (ref 3.4–5.3)
PROT SERPL-MCNC: 7 G/DL (ref 6.8–8.8)
RBC # BLD AUTO: 2.46 10E12/L (ref 3.8–5.2)
SODIUM SERPL-SCNC: 138 MMOL/L (ref 133–144)
VIT B12 SERPL-MCNC: 487 PG/ML (ref 193–986)
WBC # BLD AUTO: 5.9 10E9/L (ref 4–11)

## 2020-10-09 PROCEDURE — 80053 COMPREHEN METABOLIC PANEL: CPT | Performed by: PATHOLOGY

## 2020-10-09 PROCEDURE — 250N000011 HC RX IP 250 OP 636: Performed by: INTERNAL MEDICINE

## 2020-10-09 PROCEDURE — 85025 COMPLETE CBC W/AUTO DIFF WBC: CPT | Performed by: PATHOLOGY

## 2020-10-09 PROCEDURE — G0008 ADMIN INFLUENZA VIRUS VAC: HCPCS | Performed by: INTERNAL MEDICINE

## 2020-10-09 PROCEDURE — 96413 CHEMO IV INFUSION 1 HR: CPT

## 2020-10-09 PROCEDURE — 96417 CHEMO IV INFUS EACH ADDL SEQ: CPT

## 2020-10-09 PROCEDURE — 99207 PR NO CHARGE LOS: CPT

## 2020-10-09 PROCEDURE — 258N000003 HC RX IP 258 OP 636: Performed by: INTERNAL MEDICINE

## 2020-10-09 PROCEDURE — 82607 VITAMIN B-12: CPT | Performed by: PATHOLOGY

## 2020-10-09 PROCEDURE — 90686 IIV4 VACC NO PRSV 0.5 ML IM: CPT | Performed by: INTERNAL MEDICINE

## 2020-10-09 PROCEDURE — 96375 TX/PRO/DX INJ NEW DRUG ADDON: CPT

## 2020-10-09 RX ORDER — DEXAMETHASONE 4 MG/1
8 TABLET ORAL 2 TIMES DAILY
Qty: 10 TABLET | Refills: 5 | Status: SHIPPED | OUTPATIENT
Start: 2020-10-09 | End: 2020-11-22

## 2020-10-09 RX ORDER — HEPARIN SODIUM (PORCINE) LOCK FLUSH IV SOLN 100 UNIT/ML 100 UNIT/ML
5 SOLUTION INTRAVENOUS
Status: DISCONTINUED | OUTPATIENT
Start: 2020-10-09 | End: 2020-10-09 | Stop reason: HOSPADM

## 2020-10-09 RX ORDER — HEPARIN SODIUM (PORCINE) LOCK FLUSH IV SOLN 100 UNIT/ML 100 UNIT/ML
5 SOLUTION INTRAVENOUS ONCE
Status: COMPLETED | OUTPATIENT
Start: 2020-10-09 | End: 2020-10-09

## 2020-10-09 RX ADMIN — SODIUM CHLORIDE 250 ML: 9 INJECTION, SOLUTION INTRAVENOUS at 10:30

## 2020-10-09 RX ADMIN — CARBOPLATIN 750 MG: 10 INJECTION, SOLUTION INTRAVENOUS at 13:22

## 2020-10-09 RX ADMIN — Medication 5 ML: at 09:44

## 2020-10-09 RX ADMIN — DOCETAXEL 108 MG: 20 INJECTION, SOLUTION, CONCENTRATE INTRAVENOUS at 12:20

## 2020-10-09 RX ADMIN — INFLUENZA A VIRUS A/GUANGDONG-MAONAN/SWL1536/2019 CNIC-1909 (H1N1) ANTIGEN (FORMALDEHYDE INACTIVATED), INFLUENZA A VIRUS A/HONG KONG/2671/2019 (H3N2) ANTIGEN (FORMALDEHYDE INACTIVATED), INFLUENZA B VIRUS B/PHUKET/3073/2013 ANTIGEN (FORMALDEHYDE INACTIVATED), AND INFLUENZA B VIRUS B/WASHINGTON/02/2019 ANTIGEN (FORMALDEHYDE INACTIVATED) 0.5 ML: 15; 15; 15; 15 INJECTION, SUSPENSION INTRAMUSCULAR at 13:58

## 2020-10-09 RX ADMIN — Medication 5 ML: at 14:09

## 2020-10-09 RX ADMIN — PERTUZUMAB 420 MG: 30 INJECTION, SOLUTION, CONCENTRATE INTRAVENOUS at 11:08

## 2020-10-09 RX ADMIN — TRASTUZUMAB-ANNS 272 MG: 420 INJECTION, POWDER, LYOPHILIZED, FOR SOLUTION INTRAVENOUS at 11:42

## 2020-10-09 RX ADMIN — DEXAMETHASONE SODIUM PHOSPHATE: 10 INJECTION, SOLUTION INTRAMUSCULAR; INTRAVENOUS at 10:30

## 2020-10-09 ASSESSMENT — PAIN SCALES - GENERAL: PAINLEVEL: NO PAIN (0)

## 2020-10-09 NOTE — NURSING NOTE
Chief Complaint   Patient presents with     Port Draw     labs drawn via port by RN in lab     /73 (BP Location: Left arm, Patient Position: Chair, Cuff Size: Adult Small)   Pulse 95   Temp 98.2  F (36.8  C) (Oral)   Resp 18   Wt 47.2 kg (104 lb)   SpO2 97%   BMI 17.85 kg/m      Port accessed by RN in lab. Labs collected and sent. Pt tolerated well. Line flushed with Normal Saline & Heparin.   Pt checked in for next appointment.    Gloria Carbajal, RN

## 2020-10-09 NOTE — PROGRESS NOTES
Infusion Nursing Note:  Leah Rangel presents today for Cycle 5 Day 1 Perjeta, Herceptin, Taxotere, Carboplatin.    Patient seen by provider today: No   present during visit today: Not Applicable.    Note: Patient presents to infusion today stating she feels well. She denies any concerns, symptoms, or pain today. She had a visit with KEN Ruiz on 10/7/20 and denies any changes since that visit.     Intravenous Access:  Implanted Port.    Treatment Conditions:  Lab Results   Component Value Date    HGB 9.9 10/09/2020     Lab Results   Component Value Date    WBC 5.9 10/09/2020      Lab Results   Component Value Date    ANEU 3.5 10/09/2020     Lab Results   Component Value Date    PLT 91 10/09/2020      Lab Results   Component Value Date     10/09/2020                   Lab Results   Component Value Date    POTASSIUM 3.7 10/09/2020           No results found for: MAG         Lab Results   Component Value Date    CR 0.57 10/09/2020                   Lab Results   Component Value Date    URVASHI 8.9 10/09/2020                Lab Results   Component Value Date    BILITOTAL 0.5 10/09/2020           Lab Results   Component Value Date    ALBUMIN 3.8 10/09/2020                    Lab Results   Component Value Date    ALT 22 10/09/2020           Lab Results   Component Value Date    AST 16 10/09/2020       Results reviewed, labs MET treatment parameters, ok to proceed with treatment.      Post Infusion Assessment:  Patient tolerated infusion without incident.  Blood return noted pre and post infusion.  Site patent and intact, free from redness, edema or discomfort.  No evidence of extravasations.  Access discontinued per protocol.     Patient tolerated flu shot injection without incident to the left deltoid. Information sheet provided to patient.     Discharge Plan:   Prescription refills given for Decadron.  Discharge instructions reviewed with: Patient.  Patient and/or family verbalized  understanding of discharge instructions and all questions answered.  AVS to patient via [employer].  Patient will return 10/29/20 for next appointment.   Patient discharged in stable condition accompanied by: self.  Departure Mode: Ambulatory.    Katelynn Mc RN

## 2020-10-29 ENCOUNTER — VIRTUAL VISIT (OUTPATIENT)
Dept: ONCOLOGY | Facility: CLINIC | Age: 40
End: 2020-10-29
Attending: PHYSICIAN ASSISTANT
Payer: COMMERCIAL

## 2020-10-29 DIAGNOSIS — Z17.0 MALIGNANT NEOPLASM OF UPPER-INNER QUADRANT OF RIGHT BREAST IN FEMALE, ESTROGEN RECEPTOR POSITIVE (H): Primary | ICD-10-CM

## 2020-10-29 DIAGNOSIS — C50.211 MALIGNANT NEOPLASM OF UPPER-INNER QUADRANT OF RIGHT BREAST IN FEMALE, ESTROGEN RECEPTOR POSITIVE (H): Primary | ICD-10-CM

## 2020-10-29 PROCEDURE — 999N001193 HC VIDEO/TELEPHONE VISIT; NO CHARGE

## 2020-10-29 PROCEDURE — 99214 OFFICE O/P EST MOD 30 MIN: CPT | Mod: 95 | Performed by: NURSE PRACTITIONER

## 2020-10-29 RX ORDER — MEPERIDINE HYDROCHLORIDE 25 MG/ML
25 INJECTION INTRAMUSCULAR; INTRAVENOUS; SUBCUTANEOUS EVERY 30 MIN PRN
Status: CANCELLED | OUTPATIENT
Start: 2020-10-30

## 2020-10-29 RX ORDER — ACETAMINOPHEN 325 MG/1
650 TABLET ORAL
Status: CANCELLED
Start: 2020-10-30

## 2020-10-29 RX ORDER — SODIUM CHLORIDE 9 MG/ML
1000 INJECTION, SOLUTION INTRAVENOUS CONTINUOUS PRN
Status: CANCELLED
Start: 2020-10-30

## 2020-10-29 RX ORDER — ALBUTEROL SULFATE 90 UG/1
1-2 AEROSOL, METERED RESPIRATORY (INHALATION)
Status: CANCELLED
Start: 2020-10-30

## 2020-10-29 RX ORDER — METHYLPREDNISOLONE SODIUM SUCCINATE 125 MG/2ML
125 INJECTION, POWDER, LYOPHILIZED, FOR SOLUTION INTRAMUSCULAR; INTRAVENOUS
Status: CANCELLED
Start: 2020-10-30

## 2020-10-29 RX ORDER — DIPHENHYDRAMINE HYDROCHLORIDE 50 MG/ML
50 INJECTION INTRAMUSCULAR; INTRAVENOUS
Status: CANCELLED
Start: 2020-10-30

## 2020-10-29 RX ORDER — HEPARIN SODIUM,PORCINE 10 UNIT/ML
5 VIAL (ML) INTRAVENOUS
Status: CANCELLED | OUTPATIENT
Start: 2020-10-30

## 2020-10-29 RX ORDER — LORAZEPAM 2 MG/ML
0.5 INJECTION INTRAMUSCULAR EVERY 4 HOURS PRN
Status: CANCELLED
Start: 2020-10-30

## 2020-10-29 RX ORDER — ALBUTEROL SULFATE 0.83 MG/ML
2.5 SOLUTION RESPIRATORY (INHALATION)
Status: CANCELLED | OUTPATIENT
Start: 2020-10-30

## 2020-10-29 RX ORDER — DIPHENHYDRAMINE HCL 25 MG
50 CAPSULE ORAL
Status: CANCELLED | OUTPATIENT
Start: 2020-10-30

## 2020-10-29 RX ORDER — EPINEPHRINE 1 MG/ML
0.3 INJECTION, SOLUTION INTRAMUSCULAR; SUBCUTANEOUS EVERY 5 MIN PRN
Status: CANCELLED | OUTPATIENT
Start: 2020-10-30

## 2020-10-29 RX ORDER — NALOXONE HYDROCHLORIDE 0.4 MG/ML
.1-.4 INJECTION, SOLUTION INTRAMUSCULAR; INTRAVENOUS; SUBCUTANEOUS
Status: CANCELLED | OUTPATIENT
Start: 2020-10-30

## 2020-10-29 RX ORDER — HEPARIN SODIUM (PORCINE) LOCK FLUSH IV SOLN 100 UNIT/ML 100 UNIT/ML
5 SOLUTION INTRAVENOUS
Status: CANCELLED | OUTPATIENT
Start: 2020-10-30

## 2020-10-29 NOTE — PROGRESS NOTES
"Leah Rangel is a 40 year old female who is being evaluated via a billable video visit.      The patient has been notified of following:     \"This video visit will be conducted via a call between you and your physician/provider. We have found that certain health care needs can be provided without the need for an in-person physical exam.  This service lets us provide the care you need with a video conversation.  If a prescription is necessary we can send it directly to your pharmacy.  If lab work is needed we can place an order for that and you can then stop by our lab to have the test done at a later time.    Video visits are billed at different rates depending on your insurance coverage.  Please reach out to your insurance provider with any questions.    If during the course of the call the physician/provider feels a video visit is not appropriate, you will not be charged for this service.\"    Patient has given verbal consent for Video visit? Yes    How would you like to obtain your AVS? MyChart    If you are dropped from the video visit, the video invite should be resent to: Text to cell phone: [phone]     Will anyone else be joining your video visit? No         I have reviewed and updated the patient's allergies and medication list.    Concerns: No new concerns.   Refills: None needed.       Vitals - Patient Reported  Weight (Patient Reported): 46.7 kg (103 lb)  Height (Patient Reported): 162.6 cm (5' 4.02\")  BMI (Based on Pt Reported Ht/Wt): 17.67  Pain Score: No Pain (0)        Katelynn Carmichael CMA      Video-Visit Details    Type of service:  Video Visit    Video Start Time: 1425  Video End Time: 1445    Originating Location (pt. Location): Home    Distant Location (provider location):  [facility]     Platform used for Video Visit: ADAMA Hicks CNP          Reason for Visit: seen in f/u of invasive ductal carcinoma of the R breast, grade 3, ER positive, " SD positive, HER-2 positive, 3+ by IHC    Oncology HPI:   Leah Rangel is a  40 year old, premenopausal female, diagnosed with R breast cancer, T2Nx right breast cancer.      Leah reports that she has a history of bilateral implants.  She noticed a palpable mass involving her right breast in the upper inner quadrant in early 06/2020.  At that time, she went in for an evaluation with a mammogram and ultrasound.  Mammogram on 06/11/2020 revealed a 7 mm mass at the 1 o'clock position approximately 5 cm from the nipple in the upper inner quadrant of the right breast.  There was a second area measuring 6 x 5 x 4 mm at the 1 o'clock position 6 cm from the nipple.  In the left breast, there was an area of microcalcifications.  She was referred for a right breast biopsy.  Both areas at the 1 o'clock position were biopsied.  No lymph nodes were appreciated.  Both of these biopsies revealed an invasive ductal carcinoma, grade 3, ER positive, SD positive, HER-2 positive, 3+ by IHC.  She  was considered to have a T1b multifocal N1 breast cancer.  On further review, it appeared to be T2Nx. She was subsequently referred for a breast MRI.  The BMRI done on 7/10/20 showed a 4.5 x 2.4 x 3 cm mass in the R upper, inner breast. She underwent a PET/CT 7/16 and started neoadjuvant treatment with Docetaxel/Carboplatin/Trastuzumab/Pertuzumab (TCHP) 7/17/20.  Lymph nodes appeared normal on MRI and PET. On US on 9/28/20, the mass was no longer present, calcifications were present. She completed 5 cycles of TCHP and presents for evaluation prior to cycle 6.     Interval history: Leah is seen via video conference for today's visit She is planned for herceptin and perjeta alone today. She is feeling well today. Has noted progressive knee arthralgias with stair walking. Has moderate nausea and dairrhea after her infusions, but feeling that is under good control. Fatigue and stamina have been steadily declining. Continues to work from  home, is busy with her family. Kids are schooling from home. No fevers/chills, cough, sob, cp, palpitation. No headaches, vision changes. No mouth sores. Prior breast discomfort is much better. Feeling excited to be moving toward surgery. Is planned for bilateral mastectomy on 11/12.    Current Outpatient Medications   Medication Sig Dispense Refill     dexamethasone (DECADRON) 4 MG tablet Take 2 tablets (8 mg) by mouth 2 times daily Evening AFTER Docetaxel and continue for 4 additional doses. 10 tablet 5     dexamethasone (DECADRON) 4 MG tablet Take 2 tablets (8 mg) by mouth 2 times daily Evening AFTER Docetaxel and continue for 4 additional doses. 10 tablet 5     diazepam (VALIUM) 5 MG tablet Take one tablet one hour prior to scan. May repeat dose one time. 2 tablet 0     lidocaine (XYLOCAINE) 2 % external gel Apply topically 3 times daily as needed for moderate pain 30 mL 1     lidocaine-prilocaine (EMLA) 2.5-2.5 % external cream Apply topically as needed for moderate pain Apply to PORT site 45 minutes prior to procedure.  Cover with a non-absorbent dressing. 30 g 4     LORazepam (ATIVAN) 0.5 MG tablet Take 1 tablet (0.5 mg) by mouth every 4 hours as needed (Anxiety, Nausea/Vomiting or Sleep) 30 tablet 5     magic mouthwash suspension, diphenhydrAMINE, lidocaine, aluminum-magnesium & simethicone, (FIRST-MOUTHWASH BLM) compounding kit Swish and swallow 5-10 mLs in mouth every 6 hours as needed for mouth sores 237 mL 1     methylphenidate (RITALIN) 5 MG tablet Take 0.5-1 tablets (2.5-5 mg) by mouth 2 times daily as needed (fatigue) Take no later than 2pm 60 tablet 0     morphine (MSIR) 15 MG IR tablet Take 0.5-1 tablets (7.5-15 mg) by mouth every 4 hours as needed for severe pain 45 tablet 0     ondansetron (ZOFRAN) 8 MG tablet Take 1 tablet (8 mg) by mouth every 8 hours as needed (Nausea/Vomiting) 30 tablet 3     order for DME Equipment: Wig  Diagnosis: cranial alopecia 1 each 0     predniSONE (DELTASONE) 20 MG  tablet Take 1 tablet (20 mg) by mouth daily 5 tablet 0     prochlorperazine (COMPAZINE) 10 MG tablet Take 1 tablet (10 mg) by mouth every 6 hours as needed (Nausea/Vomiting) 30 tablet 5     Vaginal Lubricant (REPLENS) GEL Apply vaginally as needed 35 g 1          Allergies   Allergen Reactions     Codeine Other (See Comments)     Got hyper         Video physical exam  General: Patient appears well in no acute distress.  Skin: No visualized rash or lesions on visualized skin  Eyes: EOMI, no erythema, sclera icterus or discharge noted  Resp: Appears to be breathing comfortably without accessory muscle usage, speaking in full sentences, no cough  MSK: Appears to have normal range of motion based on visualized movements  Neurologic: No apparent tremors, facial movements symmetric  Psych: affect engaged, pleasant, alert and oriented    The rest of a comprehensive physical examination is deferred due to PHE (public health emergency) video restrictions     There were no vitals taken for this visit.  Wt Readings from Last 4 Encounters:   10/09/20 47.2 kg (104 lb)   09/28/20 46.4 kg (102 lb 3.2 oz)   09/18/20 49.1 kg (108 lb 3.2 oz)   08/28/20 49 kg (108 lb)         Labs: pending    Imaging: n/a    Impression/plan:   1.) ER/ND +, HER-2 positive R upper/inner quadrant breast cancer.   Clinically T2N0  - Started TCHP chemotherapy 8/6/2020- She has been having issues with nausea, diarrhea, and weight loss.  S/p 4 cycles.   - Dr. Gerardo reviewed US 9/28 which shows the mass has completely resolved.  She still has calcifications present, but the mass has disappeared.  - she completed 5 cycles of TCHP. For her 6th cycle tomorrow, she will have herceptin/perjeta alone.   Further maintenance will be decided on path from surgery. She understands she will need adjuvant herceptin based therapy IV every three weeks to complete one year, as well as adjuvant endocrine therapy.     - She is planning bilateral mastectomy 11/12 and  reconstruction.  -she will f/u with Dr. Gerardo after surgery to review pathology and discuss adjuvant endocrine therapy     2.) Fatigue  -2/2 chemotherapy.  Reviewed that I anticipated this to improve since we are no longer giving Carbo/Docetaxel. Post surgery, would consider cancer rehab     3.) Pain-right breast pain  -Much improved with resolution of the breast mass.      4.) Diarrhea  - reviewed that I would anticipate this to continue with this cycle as she remains on perjeta. Should continue imodium, up to 8/day. She feels she has better control now than when she started treatment     5.) Nausea  -Should be less off of Carbo/Taxol, but still may need to use antiemetics prn.   -has zofran, compazine/ativan     6) Anxiety, insomnia.   -will be stopping dex prep, so insomnia should get better. Using lorazepam on occasion, but should be able to start weaning off.     7) Peripheral neuropathy  -grade 1, s/t chemotherapy. Should improve as she gets further out from docetaxel.

## 2020-10-29 NOTE — LETTER
"    10/29/2020         RE: Leah Rangel  [address] [place] [address]        Dear Colleague,    Thank you for referring your patient, Leah Rangel, to the [facility]. Please see a copy of my visit note below.    Leah Rangel is a 40 year old female who is being evaluated via a billable video visit.      The patient has been notified of following:     \"This video visit will be conducted via a call between you and your physician/provider. We have found that certain health care needs can be provided without the need for an in-person physical exam.  This service lets us provide the care you need with a video conversation.  If a prescription is necessary we can send it directly to your pharmacy.  If lab work is needed we can place an order for that and you can then stop by our lab to have the test done at a later time.    Video visits are billed at different rates depending on your insurance coverage.  Please reach out to your insurance provider with any questions.    If during the course of the call the physician/provider feels a video visit is not appropriate, you will not be charged for this service.\"    Patient has given verbal consent for Video visit? Yes    How would you like to obtain your AVS? MyChart    If you are dropped from the video visit, the video invite should be resent to: Text to cell phone: [phone]     Will anyone else be joining your video visit? No         I have reviewed and updated the patient's allergies and medication list.    Concerns: No new concerns.   Refills: None needed.       Vitals - Patient Reported  Weight (Patient Reported): 46.7 kg (103 lb)  Height (Patient Reported): 162.6 cm (5' 4.02\")  BMI (Based on Pt Reported Ht/Wt): 17.67  Pain Score: No Pain (0)        Katelynn Carmichael CMA      Video-Visit Details    Type of service:  Video Visit    Video Start Time: 1425  Video End Time: 1445    Originating Location (pt. Location): " Home    Distant Location (provider location):  [facility]     Platform used for Video Visit: ADAMA Hicks CNP          Reason for Visit: seen in f/u of invasive ductal carcinoma of the R breast, grade 3, ER positive, NV positive, HER-2 positive, 3+ by IHC    Oncology HPI:   Leah Rangel is a  40 year old, premenopausal female, diagnosed with R breast cancer, T2Nx right breast cancer.      Leah reports that she has a history of bilateral implants.  She noticed a palpable mass involving her right breast in the upper inner quadrant in early 06/2020.  At that time, she went in for an evaluation with a mammogram and ultrasound.  Mammogram on 06/11/2020 revealed a 7 mm mass at the 1 o'clock position approximately 5 cm from the nipple in the upper inner quadrant of the right breast.  There was a second area measuring 6 x 5 x 4 mm at the 1 o'clock position 6 cm from the nipple.  In the left breast, there was an area of microcalcifications.  She was referred for a right breast biopsy.  Both areas at the 1 o'clock position were biopsied.  No lymph nodes were appreciated.  Both of these biopsies revealed an invasive ductal carcinoma, grade 3, ER positive, NV positive, HER-2 positive, 3+ by IHC.  She  was considered to have a T1b multifocal N1 breast cancer.  On further review, it appeared to be T2Nx. She was subsequently referred for a breast MRI.  The BMRI done on 7/10/20 showed a 4.5 x 2.4 x 3 cm mass in the R upper, inner breast. She underwent a PET/CT 7/16 and started neoadjuvant treatment with Docetaxel/Carboplatin/Trastuzumab/Pertuzumab (TCHP) 7/17/20.  Lymph nodes appeared normal on MRI and PET. On US on 9/28/20, the mass was no longer present, calcifications were present. She completed 5 cycles of TCHP and presents for evaluation prior to cycle 6.     Interval history: Leah is seen via video conference for today's visit She is planned for herceptin and perjeta  alone today. She is feeling well today. Has noted progressive knee arthralgias with stair walking. Has moderate nausea and dairrhea after her infusions, but feeling that is under good control. Fatigue and stamina have been steadily declining. Continues to work from home, is busy with her family. Kids are schooling from home. No fevers/chills, cough, sob, cp, palpitation. No headaches, vision changes. No mouth sores. Prior breast discomfort is much better. Feeling excited to be moving toward surgery. Is planned for bilateral mastectomy on 11/12.    Current Outpatient Medications   Medication Sig Dispense Refill     dexamethasone (DECADRON) 4 MG tablet Take 2 tablets (8 mg) by mouth 2 times daily Evening AFTER Docetaxel and continue for 4 additional doses. 10 tablet 5     dexamethasone (DECADRON) 4 MG tablet Take 2 tablets (8 mg) by mouth 2 times daily Evening AFTER Docetaxel and continue for 4 additional doses. 10 tablet 5     diazepam (VALIUM) 5 MG tablet Take one tablet one hour prior to scan. May repeat dose one time. 2 tablet 0     lidocaine (XYLOCAINE) 2 % external gel Apply topically 3 times daily as needed for moderate pain 30 mL 1     lidocaine-prilocaine (EMLA) 2.5-2.5 % external cream Apply topically as needed for moderate pain Apply to PORT site 45 minutes prior to procedure.  Cover with a non-absorbent dressing. 30 g 4     LORazepam (ATIVAN) 0.5 MG tablet Take 1 tablet (0.5 mg) by mouth every 4 hours as needed (Anxiety, Nausea/Vomiting or Sleep) 30 tablet 5     magic mouthwash suspension, diphenhydrAMINE, lidocaine, aluminum-magnesium & simethicone, (FIRST-MOUTHWASH BLM) compounding kit Swish and swallow 5-10 mLs in mouth every 6 hours as needed for mouth sores 237 mL 1     methylphenidate (RITALIN) 5 MG tablet Take 0.5-1 tablets (2.5-5 mg) by mouth 2 times daily as needed (fatigue) Take no later than 2pm 60 tablet 0     morphine (MSIR) 15 MG IR tablet Take 0.5-1 tablets (7.5-15 mg) by mouth every 4 hours  as needed for severe pain 45 tablet 0     ondansetron (ZOFRAN) 8 MG tablet Take 1 tablet (8 mg) by mouth every 8 hours as needed (Nausea/Vomiting) 30 tablet 3     order for DME Equipment: Wig  Diagnosis: cranial alopecia 1 each 0     predniSONE (DELTASONE) 20 MG tablet Take 1 tablet (20 mg) by mouth daily 5 tablet 0     prochlorperazine (COMPAZINE) 10 MG tablet Take 1 tablet (10 mg) by mouth every 6 hours as needed (Nausea/Vomiting) 30 tablet 5     Vaginal Lubricant (REPLENS) GEL Apply vaginally as needed 35 g 1          Allergies   Allergen Reactions     Codeine Other (See Comments)     Got hyper         Video physical exam  General: Patient appears well in no acute distress.  Skin: No visualized rash or lesions on visualized skin  Eyes: EOMI, no erythema, sclera icterus or discharge noted  Resp: Appears to be breathing comfortably without accessory muscle usage, speaking in full sentences, no cough  MSK: Appears to have normal range of motion based on visualized movements  Neurologic: No apparent tremors, facial movements symmetric  Psych: affect engaged, pleasant, alert and oriented    The rest of a comprehensive physical examination is deferred due to PHE (public health emergency) video restrictions     There were no vitals taken for this visit.  Wt Readings from Last 4 Encounters:   10/09/20 47.2 kg (104 lb)   09/28/20 46.4 kg (102 lb 3.2 oz)   09/18/20 49.1 kg (108 lb 3.2 oz)   08/28/20 49 kg (108 lb)         Labs: pending    Imaging: n/a    Impression/plan:   1.) ER/TN +, HER-2 positive R upper/inner quadrant breast cancer.   Clinically T2N0  - Started TCHP chemotherapy 8/6/2020- She has been having issues with nausea, diarrhea, and weight loss.  S/p 4 cycles.   - Dr. Gerardo reviewed US 9/28 which shows the mass has completely resolved.  She still has calcifications present, but the mass has disappeared.  - she completed 5 cycles of TCHP. For her 6th cycle tomorrow, she will have herceptin/perjeta alone.    Further maintenance will be decided on path from surgery. She understands she will need adjuvant herceptin based therapy IV every three weeks to complete one year, as well as adjuvant endocrine therapy.     - She is planning bilateral mastectomy 11/12 and reconstruction.  -she will f/u with Dr. Gerardo after surgery to review pathology and discuss adjuvant endocrine therapy     2.) Fatigue  -2/2 chemotherapy.  Reviewed that I anticipated this to improve since we are no longer giving Carbo/Docetaxel. Post surgery, would consider cancer rehab     3.) Pain-right breast pain  -Much improved with resolution of the breast mass.      4.) Diarrhea  - reviewed that I would anticipate this to continue with this cycle as she remains on perjeta. Should continue imodium, up to 8/day. She feels she has better control now than when she started treatment     5.) Nausea  -Should be less off of Carbo/Taxol, but still may need to use antiemetics prn.   -has zofran, compazine/ativan     6) Anxiety, insomnia.   -will be stopping dex prep, so insomnia should get better. Using lorazepam on occasion, but should be able to start weaning off.     7) Peripheral neuropathy  -grade 1, s/t chemotherapy. Should improve as she gets further out from docetaxel.            Again, thank you for allowing me to participate in the care of your patient.        Sincerely,        ADAMA Morris CNP

## 2020-10-30 ENCOUNTER — APPOINTMENT (OUTPATIENT)
Dept: LAB | Facility: CLINIC | Age: 40
End: 2020-10-30
Attending: INTERNAL MEDICINE
Payer: COMMERCIAL

## 2020-10-30 ENCOUNTER — INFUSION THERAPY VISIT (OUTPATIENT)
Dept: ONCOLOGY | Facility: CLINIC | Age: 40
End: 2020-10-30
Attending: INTERNAL MEDICINE
Payer: COMMERCIAL

## 2020-10-30 VITALS
HEART RATE: 101 BPM | DIASTOLIC BLOOD PRESSURE: 72 MMHG | TEMPERATURE: 98.8 F | BODY MASS INDEX: 18.81 KG/M2 | OXYGEN SATURATION: 98 % | SYSTOLIC BLOOD PRESSURE: 110 MMHG | WEIGHT: 109.6 LBS | RESPIRATION RATE: 14 BRPM

## 2020-10-30 DIAGNOSIS — C50.211 MALIGNANT NEOPLASM OF UPPER-INNER QUADRANT OF RIGHT BREAST IN FEMALE, ESTROGEN RECEPTOR POSITIVE (H): Primary | ICD-10-CM

## 2020-10-30 DIAGNOSIS — Z17.0 MALIGNANT NEOPLASM OF UPPER-INNER QUADRANT OF RIGHT BREAST IN FEMALE, ESTROGEN RECEPTOR POSITIVE (H): Primary | ICD-10-CM

## 2020-10-30 LAB
ALBUMIN SERPL-MCNC: 3.8 G/DL (ref 3.4–5)
ALP SERPL-CCNC: 59 U/L (ref 40–150)
ALT SERPL W P-5'-P-CCNC: 21 U/L (ref 0–50)
ANION GAP SERPL CALCULATED.3IONS-SCNC: 4 MMOL/L (ref 3–14)
AST SERPL W P-5'-P-CCNC: 16 U/L (ref 0–45)
BASOPHILS # BLD AUTO: 0 10E9/L (ref 0–0.2)
BASOPHILS NFR BLD AUTO: 0.4 %
BILIRUB SERPL-MCNC: 0.3 MG/DL (ref 0.2–1.3)
BUN SERPL-MCNC: 6 MG/DL (ref 7–30)
CALCIUM SERPL-MCNC: 8.7 MG/DL (ref 8.5–10.1)
CHLORIDE SERPL-SCNC: 106 MMOL/L (ref 94–109)
CO2 SERPL-SCNC: 30 MMOL/L (ref 20–32)
CREAT SERPL-MCNC: 0.6 MG/DL (ref 0.52–1.04)
DIFFERENTIAL METHOD BLD: ABNORMAL
EOSINOPHIL # BLD AUTO: 0 10E9/L (ref 0–0.7)
EOSINOPHIL NFR BLD AUTO: 0.2 %
ERYTHROCYTE [DISTWIDTH] IN BLOOD BY AUTOMATED COUNT: 17.3 % (ref 10–15)
GFR SERPL CREATININE-BSD FRML MDRD: >90 ML/MIN/{1.73_M2}
GLUCOSE SERPL-MCNC: 94 MG/DL (ref 70–99)
HCT VFR BLD AUTO: 26.4 % (ref 35–47)
HGB BLD-MCNC: 9.1 G/DL (ref 11.7–15.7)
IMM GRANULOCYTES # BLD: 0 10E9/L (ref 0–0.4)
IMM GRANULOCYTES NFR BLD: 0.2 %
LYMPHOCYTES # BLD AUTO: 2.1 10E9/L (ref 0.8–5.3)
LYMPHOCYTES NFR BLD AUTO: 41.9 %
MCH RBC QN AUTO: 42.3 PG (ref 26.5–33)
MCHC RBC AUTO-ENTMCNC: 34.5 G/DL (ref 31.5–36.5)
MCV RBC AUTO: 123 FL (ref 78–100)
MONOCYTES # BLD AUTO: 0.3 10E9/L (ref 0–1.3)
MONOCYTES NFR BLD AUTO: 6.2 %
NEUTROPHILS # BLD AUTO: 2.6 10E9/L (ref 1.6–8.3)
NEUTROPHILS NFR BLD AUTO: 51.1 %
NRBC # BLD AUTO: 0 10*3/UL
NRBC BLD AUTO-RTO: 0 /100
PLATELET # BLD AUTO: 86 10E9/L (ref 150–450)
POTASSIUM SERPL-SCNC: 3.9 MMOL/L (ref 3.4–5.3)
PROT SERPL-MCNC: 7 G/DL (ref 6.8–8.8)
RBC # BLD AUTO: 2.15 10E12/L (ref 3.8–5.2)
SODIUM SERPL-SCNC: 140 MMOL/L (ref 133–144)
WBC # BLD AUTO: 5 10E9/L (ref 4–11)

## 2020-10-30 PROCEDURE — 96417 CHEMO IV INFUS EACH ADDL SEQ: CPT

## 2020-10-30 PROCEDURE — 85025 COMPLETE CBC W/AUTO DIFF WBC: CPT | Performed by: INTERNAL MEDICINE

## 2020-10-30 PROCEDURE — 96413 CHEMO IV INFUSION 1 HR: CPT

## 2020-10-30 PROCEDURE — 250N000011 HC RX IP 250 OP 636: Performed by: NURSE PRACTITIONER

## 2020-10-30 PROCEDURE — 80053 COMPREHEN METABOLIC PANEL: CPT | Performed by: INTERNAL MEDICINE

## 2020-10-30 PROCEDURE — 99207 PR NO CHARGE LOS: CPT

## 2020-10-30 PROCEDURE — 258N000003 HC RX IP 258 OP 636: Performed by: NURSE PRACTITIONER

## 2020-10-30 PROCEDURE — 96375 TX/PRO/DX INJ NEW DRUG ADDON: CPT

## 2020-10-30 RX ORDER — LORAZEPAM 2 MG/ML
0.5 INJECTION INTRAMUSCULAR EVERY 4 HOURS PRN
Status: DISCONTINUED | OUTPATIENT
Start: 2020-10-30 | End: 2020-10-30 | Stop reason: HOSPADM

## 2020-10-30 RX ORDER — HEPARIN SODIUM (PORCINE) LOCK FLUSH IV SOLN 100 UNIT/ML 100 UNIT/ML
5 SOLUTION INTRAVENOUS
Status: DISCONTINUED | OUTPATIENT
Start: 2020-10-30 | End: 2020-10-30 | Stop reason: HOSPADM

## 2020-10-30 RX ADMIN — SODIUM CHLORIDE 250 ML: 9 INJECTION, SOLUTION INTRAVENOUS at 09:57

## 2020-10-30 RX ADMIN — Medication 5 ML: at 11:07

## 2020-10-30 RX ADMIN — TRASTUZUMAB-ANNS 272 MG: 420 INJECTION, POWDER, LYOPHILIZED, FOR SOLUTION INTRAVENOUS at 11:13

## 2020-10-30 RX ADMIN — DEXAMETHASONE SODIUM PHOSPHATE: 10 INJECTION, SOLUTION INTRAMUSCULAR; INTRAVENOUS at 10:20

## 2020-10-30 RX ADMIN — LORAZEPAM 0.5 MG: 2 INJECTION, SOLUTION INTRAMUSCULAR; INTRAVENOUS at 09:57

## 2020-10-30 RX ADMIN — PERTUZUMAB 420 MG: 30 INJECTION, SOLUTION, CONCENTRATE INTRAVENOUS at 10:38

## 2020-10-30 ASSESSMENT — PAIN SCALES - GENERAL: PAINLEVEL: NO PAIN (0)

## 2020-10-30 NOTE — PROGRESS NOTES
Infusion Nursing Note:  Leah Rangel presents today for C6 Perjeta-Kanjinti.    Patient seen by provider today: Yes: Camila Spicer DNP 10/29, no change   present during visit today: Not Applicable.    Note: Pt saw provider 10/29, no changes overnight.  Ok for treatment.  IB sent to DR Gerardo to follow up with pt re:B12 levels.  PRN Ativan administered per pt request.    Intravenous Access:  Implanted Port.    Treatment Conditions:  Lab Results   Component Value Date    HGB 9.1 10/30/2020     Lab Results   Component Value Date    WBC 5.0 10/30/2020      Lab Results   Component Value Date    ANEU 2.6 10/30/2020     Lab Results   Component Value Date    PLT 86 10/30/2020      Lab Results   Component Value Date     10/30/2020                   Lab Results   Component Value Date    POTASSIUM 3.9 10/30/2020           No results found for: MAG         Lab Results   Component Value Date    CR 0.60 10/30/2020                   Lab Results   Component Value Date    URVASHI 8.7 10/30/2020                Lab Results   Component Value Date    BILITOTAL 0.3 10/30/2020           Lab Results   Component Value Date    ALBUMIN 3.8 10/30/2020                    Lab Results   Component Value Date    ALT 21 10/30/2020           Lab Results   Component Value Date    AST 16 10/30/2020       ECHO/MUGA completed 7/13  EF 60-65%.      Post Infusion Assessment:  Patient tolerated infusion without incident.  Blood return noted pre and post infusion.  Site patent and intact, free from redness, edema or discomfort.  No evidence of extravasations.  Access discontinued per protocol.       Discharge Plan:   Prescription refills given for Ativan.  Discharge instructions reviewed with: Patient.  Patient and/or family verbalized understanding of discharge instructions and all questions answered.  AVS to patient via [employer].  Patient will return 11/6 fo ECHO and 11/23 for next infusion appointment.   Patient discharged in stable  condition accompanied by: self.  Departure Mode: Ambulatory.    Selene Hussein RN

## 2020-11-06 ENCOUNTER — ANCILLARY PROCEDURE (OUTPATIENT)
Dept: CARDIOLOGY | Facility: CLINIC | Age: 40
End: 2020-11-06
Attending: INTERNAL MEDICINE
Payer: COMMERCIAL

## 2020-11-06 DIAGNOSIS — Z17.0 MALIGNANT NEOPLASM OF UPPER-INNER QUADRANT OF RIGHT BREAST IN FEMALE, ESTROGEN RECEPTOR POSITIVE (H): ICD-10-CM

## 2020-11-06 DIAGNOSIS — C50.211 MALIGNANT NEOPLASM OF UPPER-INNER QUADRANT OF RIGHT BREAST IN FEMALE, ESTROGEN RECEPTOR POSITIVE (H): ICD-10-CM

## 2020-11-06 PROCEDURE — 93306 TTE W/DOPPLER COMPLETE: CPT | Performed by: INTERNAL MEDICINE

## 2020-11-12 ENCOUNTER — TRANSFERRED RECORDS (OUTPATIENT)
Dept: HEALTH INFORMATION MANAGEMENT | Facility: CLINIC | Age: 40
End: 2020-11-12

## 2020-11-22 DIAGNOSIS — C50.211 MALIGNANT NEOPLASM OF UPPER-INNER QUADRANT OF RIGHT BREAST IN FEMALE, ESTROGEN RECEPTOR POSITIVE (H): Primary | ICD-10-CM

## 2020-11-22 DIAGNOSIS — Z17.0 MALIGNANT NEOPLASM OF UPPER-INNER QUADRANT OF RIGHT BREAST IN FEMALE, ESTROGEN RECEPTOR POSITIVE (H): Primary | ICD-10-CM

## 2020-11-22 RX ORDER — EPINEPHRINE 1 MG/ML
0.3 INJECTION, SOLUTION INTRAMUSCULAR; SUBCUTANEOUS EVERY 5 MIN PRN
Status: CANCELLED | OUTPATIENT
Start: 2020-11-23

## 2020-11-22 RX ORDER — HEPARIN SODIUM,PORCINE 10 UNIT/ML
5 VIAL (ML) INTRAVENOUS
Status: CANCELLED | OUTPATIENT
Start: 2020-11-23

## 2020-11-22 RX ORDER — NALOXONE HYDROCHLORIDE 0.4 MG/ML
.1-.4 INJECTION, SOLUTION INTRAMUSCULAR; INTRAVENOUS; SUBCUTANEOUS
Status: CANCELLED | OUTPATIENT
Start: 2020-11-23

## 2020-11-22 RX ORDER — METHYLPREDNISOLONE SODIUM SUCCINATE 125 MG/2ML
125 INJECTION, POWDER, LYOPHILIZED, FOR SOLUTION INTRAMUSCULAR; INTRAVENOUS
Status: CANCELLED
Start: 2020-11-23

## 2020-11-22 RX ORDER — LORAZEPAM 2 MG/ML
0.5 INJECTION INTRAMUSCULAR EVERY 4 HOURS PRN
Status: CANCELLED
Start: 2020-11-23

## 2020-11-22 RX ORDER — HEPARIN SODIUM (PORCINE) LOCK FLUSH IV SOLN 100 UNIT/ML 100 UNIT/ML
5 SOLUTION INTRAVENOUS
Status: CANCELLED | OUTPATIENT
Start: 2020-11-23

## 2020-11-22 RX ORDER — ALBUTEROL SULFATE 0.83 MG/ML
2.5 SOLUTION RESPIRATORY (INHALATION)
Status: CANCELLED | OUTPATIENT
Start: 2020-11-23

## 2020-11-22 RX ORDER — MEPERIDINE HYDROCHLORIDE 25 MG/ML
25 INJECTION INTRAMUSCULAR; INTRAVENOUS; SUBCUTANEOUS EVERY 30 MIN PRN
Status: CANCELLED | OUTPATIENT
Start: 2020-11-23

## 2020-11-22 RX ORDER — ALBUTEROL SULFATE 90 UG/1
1-2 AEROSOL, METERED RESPIRATORY (INHALATION)
Status: CANCELLED
Start: 2020-11-23

## 2020-11-22 RX ORDER — DIPHENHYDRAMINE HYDROCHLORIDE 50 MG/ML
50 INJECTION INTRAMUSCULAR; INTRAVENOUS
Status: CANCELLED
Start: 2020-11-23

## 2020-11-22 RX ORDER — SODIUM CHLORIDE 9 MG/ML
1000 INJECTION, SOLUTION INTRAVENOUS CONTINUOUS PRN
Status: CANCELLED
Start: 2020-11-23

## 2020-11-23 ENCOUNTER — INFUSION THERAPY VISIT (OUTPATIENT)
Dept: ONCOLOGY | Facility: CLINIC | Age: 40
End: 2020-11-23
Attending: INTERNAL MEDICINE
Payer: COMMERCIAL

## 2020-11-23 ENCOUNTER — APPOINTMENT (OUTPATIENT)
Dept: LAB | Facility: CLINIC | Age: 40
End: 2020-11-23
Attending: INTERNAL MEDICINE
Payer: COMMERCIAL

## 2020-11-23 VITALS
HEART RATE: 78 BPM | WEIGHT: 109 LBS | BODY MASS INDEX: 18.71 KG/M2 | DIASTOLIC BLOOD PRESSURE: 73 MMHG | SYSTOLIC BLOOD PRESSURE: 110 MMHG | RESPIRATION RATE: 16 BRPM | TEMPERATURE: 98.2 F | OXYGEN SATURATION: 98 %

## 2020-11-23 DIAGNOSIS — Z17.0 MALIGNANT NEOPLASM OF UPPER-INNER QUADRANT OF RIGHT BREAST IN FEMALE, ESTROGEN RECEPTOR POSITIVE (H): Primary | ICD-10-CM

## 2020-11-23 DIAGNOSIS — C50.211 MALIGNANT NEOPLASM OF UPPER-INNER QUADRANT OF RIGHT BREAST IN FEMALE, ESTROGEN RECEPTOR POSITIVE (H): Primary | ICD-10-CM

## 2020-11-23 DIAGNOSIS — Z51.11 ENCOUNTER FOR ANTINEOPLASTIC CHEMOTHERAPY: ICD-10-CM

## 2020-11-23 LAB
ALBUMIN SERPL-MCNC: 3.6 G/DL (ref 3.4–5)
ALP SERPL-CCNC: 82 U/L (ref 40–150)
ALT SERPL W P-5'-P-CCNC: 17 U/L (ref 0–50)
ANION GAP SERPL CALCULATED.3IONS-SCNC: 6 MMOL/L (ref 3–14)
AST SERPL W P-5'-P-CCNC: 14 U/L (ref 0–45)
BASOPHILS # BLD AUTO: 0 10E9/L (ref 0–0.2)
BASOPHILS NFR BLD AUTO: 0.2 %
BILIRUB SERPL-MCNC: 0.2 MG/DL (ref 0.2–1.3)
BUN SERPL-MCNC: 5 MG/DL (ref 7–30)
CALCIUM SERPL-MCNC: 9.6 MG/DL (ref 8.5–10.1)
CHLORIDE SERPL-SCNC: 97 MMOL/L (ref 94–109)
CO2 SERPL-SCNC: 30 MMOL/L (ref 20–32)
CREAT SERPL-MCNC: 0.57 MG/DL (ref 0.52–1.04)
DIFFERENTIAL METHOD BLD: ABNORMAL
EOSINOPHIL # BLD AUTO: 0.1 10E9/L (ref 0–0.7)
EOSINOPHIL NFR BLD AUTO: 0.9 %
ERYTHROCYTE [DISTWIDTH] IN BLOOD BY AUTOMATED COUNT: 13.8 % (ref 10–15)
GFR SERPL CREATININE-BSD FRML MDRD: >90 ML/MIN/{1.73_M2}
GLUCOSE SERPL-MCNC: 92 MG/DL (ref 70–99)
HCT VFR BLD AUTO: 32.8 % (ref 35–47)
HGB BLD-MCNC: 11.2 G/DL (ref 11.7–15.7)
IMM GRANULOCYTES # BLD: 0 10E9/L (ref 0–0.4)
IMM GRANULOCYTES NFR BLD: 0.3 %
LYMPHOCYTES # BLD AUTO: 2.3 10E9/L (ref 0.8–5.3)
LYMPHOCYTES NFR BLD AUTO: 26.8 %
MCH RBC QN AUTO: 42.1 PG (ref 26.5–33)
MCHC RBC AUTO-ENTMCNC: 34.1 G/DL (ref 31.5–36.5)
MCV RBC AUTO: 123 FL (ref 78–100)
MONOCYTES # BLD AUTO: 0.8 10E9/L (ref 0–1.3)
MONOCYTES NFR BLD AUTO: 9.2 %
NEUTROPHILS # BLD AUTO: 5.5 10E9/L (ref 1.6–8.3)
NEUTROPHILS NFR BLD AUTO: 62.6 %
NRBC # BLD AUTO: 0 10*3/UL
NRBC BLD AUTO-RTO: 0 /100
PLATELET # BLD AUTO: 294 10E9/L (ref 150–450)
POTASSIUM SERPL-SCNC: 4.2 MMOL/L (ref 3.4–5.3)
PROT SERPL-MCNC: 7.5 G/DL (ref 6.8–8.8)
RBC # BLD AUTO: 2.66 10E12/L (ref 3.8–5.2)
SODIUM SERPL-SCNC: 132 MMOL/L (ref 133–144)
WBC # BLD AUTO: 8.7 10E9/L (ref 4–11)

## 2020-11-23 PROCEDURE — 96413 CHEMO IV INFUSION 1 HR: CPT

## 2020-11-23 PROCEDURE — 96415 CHEMO IV INFUSION ADDL HR: CPT

## 2020-11-23 PROCEDURE — 258N000003 HC RX IP 258 OP 636: Performed by: INTERNAL MEDICINE

## 2020-11-23 PROCEDURE — 99214 OFFICE O/P EST MOD 30 MIN: CPT | Mod: 95 | Performed by: INTERNAL MEDICINE

## 2020-11-23 PROCEDURE — 250N000011 HC RX IP 250 OP 636: Performed by: INTERNAL MEDICINE

## 2020-11-23 PROCEDURE — 80053 COMPREHEN METABOLIC PANEL: CPT | Performed by: INTERNAL MEDICINE

## 2020-11-23 PROCEDURE — 85025 COMPLETE CBC W/AUTO DIFF WBC: CPT | Performed by: INTERNAL MEDICINE

## 2020-11-23 RX ORDER — LORAZEPAM 0.5 MG/1
TABLET ORAL
COMMUNITY
Start: 2020-10-30 | End: 2021-08-09

## 2020-11-23 RX ORDER — HEPARIN SODIUM (PORCINE) LOCK FLUSH IV SOLN 100 UNIT/ML 100 UNIT/ML
5 SOLUTION INTRAVENOUS ONCE
Status: DISCONTINUED | OUTPATIENT
Start: 2020-11-23 | End: 2020-11-23 | Stop reason: HOSPADM

## 2020-11-23 RX ORDER — LORAZEPAM 0.5 MG/1
0.5 TABLET ORAL EVERY 4 HOURS PRN
Qty: 30 TABLET | Refills: 3 | Status: SHIPPED | OUTPATIENT
Start: 2020-11-23 | End: 2021-02-19

## 2020-11-23 RX ORDER — TAMOXIFEN CITRATE 20 MG/1
20 TABLET ORAL DAILY
Qty: 90 TABLET | Refills: 3 | Status: SHIPPED | OUTPATIENT
Start: 2020-11-23 | End: 2021-12-01

## 2020-11-23 RX ORDER — PROCHLORPERAZINE MALEATE 10 MG
10 TABLET ORAL EVERY 6 HOURS PRN
Qty: 30 TABLET | Refills: 3 | Status: SHIPPED | OUTPATIENT
Start: 2020-11-23

## 2020-11-23 RX ADMIN — SODIUM CHLORIDE 250 ML: 9 INJECTION, SOLUTION INTRAVENOUS at 11:27

## 2020-11-23 RX ADMIN — PERTUZUMAB 420 MG: 30 INJECTION, SOLUTION, CONCENTRATE INTRAVENOUS at 12:08

## 2020-11-23 RX ADMIN — TRASTUZUMAB-ANNS 298 MG: 420 INJECTION, POWDER, LYOPHILIZED, FOR SOLUTION INTRAVENOUS at 12:40

## 2020-11-23 ASSESSMENT — PAIN SCALES - GENERAL: PAINLEVEL: SEVERE PAIN (6)

## 2020-11-23 NOTE — LETTER
"    11/23/2020         RE: Leah Rangel  [address]  [place] [address]        Dear Colleague,    Thank you for referring your patient, Leah Rangel, to the [facility]. Please see a copy of my visit note below.    Leah Rangel is a 40 year old female who is being evaluated via a billable video visit.      The patient has been notified of following:     \"This video visit will be conducted via a call between you and your physician/provider. We have found that certain health care needs can be provided without the need for an in-person physical exam.  This service lets us provide the care you need with a video conversation.  If a prescription is necessary we can send it directly to your pharmacy.  If lab work is needed we can place an order for that and you can then stop by our lab to have the test done at a later time.    Video visits are billed at different rates depending on your insurance coverage.  Please reach out to your insurance provider with any questions.    If during the course of the call the physician/provider feels a video visit is not appropriate, you will not be charged for this service.\"    Patient has given verbal consent for Video visit? Yes  How would you like to obtain your AVS? MyChart  If you are dropped from the video visit, the video invite should be resent to: Send to e-mail at: [email or web address]  Will anyone else be joining your video visit? No      Vitals - Patient Reported  Weight (Patient Reported): 48.1 kg (106 lb)  Pain Score: Severe Pain (6)  Pain Loc: (Patient reports pain surgical sight)        I have reviewed and updated patient's allergy and medication list.    Concerns: None  Refills: Lorazepam r      Stephanie Cook CMA    Video-Visit Details    Type of service:  Video Visit    Video Start Time: 8:06 AM  Video End Time: 8:33 AM    Originating Location (pt. Location): Home    Distant Location (provider location):  [facility]" [facility]     Platform used for Video Visit: Lucio Gerardo MD    November 23, 2020     Reason for Visit: F/U for invasive ductal carcinoma of R breast, grade 3, ER positive, OK positive, HER-2 positive, 3+ by IHC    Oncology HPI:   Leah Rangel is a  40 year old, premenopausal female, diagnosed with R breast cancer, T2Nx right breast cancer.      Leah reports that she has a history of bilateral implants.  She noticed a palpable mass involving her right breast in the upper inner quadrant in early 06/2020.  At that time, she went in for an evaluation with a mammogram and ultrasound.  Mammogram on 06/11/2020 revealed a 7 mm mass at the 1 o'clock position approximately 5 cm from the nipple in the upper inner quadrant of the right breast.  There was a second area measuring 6 x 5 x 4 mm at the 1 o'clock position 6 cm from the nipple.  In the left breast, there was an area of microcalcifications.  She was referred for a right breast biopsy.  Both areas at the 1 o'clock position were biopsied.  No lymph nodes were appreciated.  Both of these biopsies revealed an invasive ductal carcinoma, grade 3, ER positive, OK positive, HER-2 positive, 3+ by IHC.  She  was considered to have a T1b multifocal N1 breast cancer.  On further review, it appeared to be T2Nx. She was subsequently referred for a breast MRI.  The BMRI done on 7/10/20 showed a 4.5 x 2.4 x 3 cm mass in the R upper, inner breast. She underwent a PET/CT 7/16 and started neoadjuvant treatment with Docetaxel/Carboplatin/Trastuzumab/Pertuzumab (TCHP) 7/17/20.  Lymph nodes appeared normal on MRI and PET.    She completed five cycles of TCHP 10/9/20, had a sixth cycle with just herceptin and perjeta on 10/30/20.  She had significant problems with fatigue, nausea, weight loss and diarrhea.  As a result, her treatment was stopped one cycle early.    She underwent bilateral mastectomies and sentinel lymph node biopsy on 11/11/20 with  "Dr Marquez Vargas and Dr Piotr Ochoa.    Interval history:    Leah comes in today after her surgery.     - She is recovering well.  Her drains are out.  She is not needing any narcotics.    She has noticed tightness in her right axilla, and feels like she has \"cords.\"      Her appetite is improving.  She is eating well.      No diarrhea.  Her surgeon told her she had a pathologic complete response.  I have not seen the path report yet, but will try to obtain this today.    -Denies fever, chills, chest pain, dyspnea, abdominal pain, vomiting, and constipation    She has many questions regarding next steps.     ROS: 10 point ROS neg other than the symptoms noted above in the HPI.    Past Medical History:   Diagnosis Date     Breast cancer (H)      Complication of anesthesia      PONV (postoperative nausea and vomiting)         Current Outpatient Medications   Medication Sig Dispense Refill     diazepam (VALIUM) 5 MG tablet Take one tablet one hour prior to scan. May repeat dose one time. 2 tablet 0     lidocaine (XYLOCAINE) 2 % external gel Apply topically 3 times daily as needed for moderate pain 30 mL 1     lidocaine-prilocaine (EMLA) 2.5-2.5 % external cream Apply topically as needed for moderate pain Apply to PORT site 45 minutes prior to procedure.  Cover with a non-absorbent dressing. 30 g 4     LORazepam (ATIVAN) 0.5 MG tablet        magic mouthwash suspension, diphenhydrAMINE, lidocaine, aluminum-magnesium & simethicone, (FIRST-MOUTHWASH BLM) compounding kit Swish and swallow 5-10 mLs in mouth every 6 hours as needed for mouth sores 237 mL 1     methylphenidate (RITALIN) 5 MG tablet Take 0.5-1 tablets (2.5-5 mg) by mouth 2 times daily as needed (fatigue) Take no later than 2pm 60 tablet 0     morphine (MSIR) 15 MG IR tablet Take 0.5-1 tablets (7.5-15 mg) by mouth every 4 hours as needed for severe pain 45 tablet 0     order for DME Equipment: Wig  Diagnosis: cranial alopecia 1 each 0     predniSONE " (DELTASONE) 20 MG tablet Take 1 tablet (20 mg) by mouth daily 5 tablet 0     Vaginal Lubricant (REPLENS) GEL Apply vaginally as needed 35 g 1          Allergies   Allergen Reactions     Codeine Other (See Comments)     Got hyper       Exam:  There were no vitals taken for this visit.  Wt Readings from Last 4 Encounters:   10/30/20 49.7 kg (109 lb 9.6 oz)   10/09/20 47.2 kg (104 lb)   09/28/20 46.4 kg (102 lb 3.2 oz)   09/18/20 49.1 kg (108 lb 3.2 oz)   GENERAL: Healthy, alert and no distress  EYES: Eyes grossly normal to inspection.  No discharge or erythema, or obvious scleral/conjunctival abnormalities.  RESP: No audible wheeze, cough, or visible cyanosis.  No visible retractions or increased work of breathing.    SKIN: Visible skin clear. No significant rash, abnormal pigmentation or lesions.  NEURO: Cranial nerves grossly intact.  Mentation and speech appropriate for age.  PSYCH: Mentation appears normal, affect normal/bright, judgement and insight intact, normal speech and appearance well-groomed.  Remainder of exam deferred due to the covid pandemic and video visit taking place.    Labs:       Lab Results   Component Value Date    WBC 5.0 10/30/2020     Lab Results   Component Value Date    RBC 2.15 10/30/2020     Lab Results   Component Value Date    HGB 9.1 10/30/2020     Lab Results   Component Value Date    HCT 26.4 10/30/2020     No components found for: MCT  Lab Results   Component Value Date     10/30/2020     Lab Results   Component Value Date    MCH 42.3 10/30/2020     Lab Results   Component Value Date    MCHC 34.5 10/30/2020     Lab Results   Component Value Date    RDW 17.3 10/30/2020     Lab Results   Component Value Date    PLT 86 10/30/2020       Recent Labs   Lab Test 10/30/20  0930 10/09/20  0954    138   POTASSIUM 3.9 3.7   CHLORIDE 106 106   CO2 30 29   ANIONGAP 4 4   GLC 94 99   BUN 6* 9   CR 0.60 0.57   URVASHI 8.7 8.9     Liver Function Studies -   Recent Labs   Lab Test  10/30/20  0930   PROTTOTAL 7.0   ALBUMIN 3.8   BILITOTAL 0.3   ALKPHOS 59   AST 16   ALT 21     Pathology report - by her description, she had a pCR.  I am waiting for the final report.    Assessment/plan:   1.) ER/MS +, HER-2 positive R upper/inner quadrant breast cancer.   Clinically T2N0  On pathology report, she had      She understands she will need adjuvant herceptin and perjeta IV every three weeks to complete one year, as well as adjuvant endocrine therapy.      We discussed that she will need adjuvant tamoxifen.  Reviewed side effects.  Script sent out.      I do not plan to reload her today, as last infusion was 10/30/20.    She will need an echo every 3 months.     2.) Fatigue  -2/2 chemotherapy.  Significantly improving.    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    4.) Diarrhea - resolved.  She is eating well.  Weight has improved.        Lucita Gerardo MD        Pathology report confirmed pCR .  There was no evidence of disease in her breast or axilla at the time of surgery on 11/12/20.  Will continue with plan of care as outlined      Again, thank you for allowing me to participate in the care of your patient.        Sincerely,        Lucita Gerardo MD

## 2020-11-23 NOTE — NURSING NOTE
Chief Complaint   Patient presents with     Port Draw     Labs drawn via PORT by RN. VS taken     Labs drawn with PIV start by rn.  Pt tolerated well.  VS taken and pt checked in for next appt.    Everett Monahan RN

## 2020-11-23 NOTE — PROGRESS NOTES
"Leah Rangel is a 40 year old female who is being evaluated via a billable video visit.      The patient has been notified of following:     \"This video visit will be conducted via a call between you and your physician/provider. We have found that certain health care needs can be provided without the need for an in-person physical exam.  This service lets us provide the care you need with a video conversation.  If a prescription is necessary we can send it directly to your pharmacy.  If lab work is needed we can place an order for that and you can then stop by our lab to have the test done at a later time.    Video visits are billed at different rates depending on your insurance coverage.  Please reach out to your insurance provider with any questions.    If during the course of the call the physician/provider feels a video visit is not appropriate, you will not be charged for this service.\"    Patient has given verbal consent for Video visit? Yes  How would you like to obtain your AVS? MyChart  If you are dropped from the video visit, the video invite should be resent to: Send to e-mail at: [email or web address]  Will anyone else be joining your video visit? No      Vitals - Patient Reported  Weight (Patient Reported): 48.1 kg (106 lb)  Pain Score: Severe Pain (6)  Pain Loc: (Patient reports pain surgical sight)        I have reviewed and updated patient's allergy and medication list.    Concerns: None  Refills: Lorazepam yamile Cook CMA    Video-Visit Details    Type of service:  Video Visit    Video Start Time: 8:06 AM  Video End Time: 8:33 AM    Originating Location (pt. Location): Home    Distant Location (provider location):  [facility]     Platform used for Video Visit: Lucio Gerardo MD    November 23, 2020     Reason for Visit: F/U for invasive ductal carcinoma of R breast, grade 3, ER positive, SD positive, HER-2 positive, 3+ by " [facility]    Oncology HPI:   Leah Rangel is a  40 year old, premenopausal female, diagnosed with R breast cancer, T2Nx right breast cancer.      Leah reports that she has a history of bilateral implants.  She noticed a palpable mass involving her right breast in the upper inner quadrant in early 06/2020.  At that time, she went in for an evaluation with a mammogram and ultrasound.  Mammogram on 06/11/2020 revealed a 7 mm mass at the 1 o'clock position approximately 5 cm from the nipple in the upper inner quadrant of the right breast.  There was a second area measuring 6 x 5 x 4 mm at the 1 o'clock position 6 cm from the nipple.  In the left breast, there was an area of microcalcifications.  She was referred for a right breast biopsy.  Both areas at the 1 o'clock position were biopsied.  No lymph nodes were appreciated.  Both of these biopsies revealed an invasive ductal carcinoma, grade 3, ER positive, ND positive, HER-2 positive, 3+ by IHC.  She  was considered to have a T1b multifocal N1 breast cancer.  On further review, it appeared to be T2Nx. She was subsequently referred for a breast MRI.  The BMRI done on 7/10/20 showed a 4.5 x 2.4 x 3 cm mass in the R upper, inner breast. She underwent a PET/CT 7/16 and started neoadjuvant treatment with Docetaxel/Carboplatin/Trastuzumab/Pertuzumab (TCHP) 7/17/20.  Lymph nodes appeared normal on MRI and PET.    She completed five cycles of TCHP 10/9/20, had a sixth cycle with just herceptin and perjeta on 10/30/20.  She had significant problems with fatigue, nausea, weight loss and diarrhea.  As a result, her treatment was stopped one cycle early.    She underwent bilateral mastectomies and sentinel lymph node biopsy on 11/11/20 with Dr Marquez Vargas and Dr Piotr Ochoa.    Interval history:    Leah comes in today after her surgery.     - She is recovering well.  Her drains are out.  She is not needing any narcotics.    She has noticed tightness in her right axilla, and  "feels like she has \"cords.\"      Her appetite is improving.  She is eating well.      No diarrhea.  Her surgeon told her she had a pathologic complete response.  I have not seen the path report yet, but will try to obtain this today.    -Denies fever, chills, chest pain, dyspnea, abdominal pain, vomiting, and constipation    She has many questions regarding next steps.     ROS: 10 point ROS neg other than the symptoms noted above in the HPI.    Past Medical History:   Diagnosis Date     Breast cancer (H)      Complication of anesthesia      PONV (postoperative nausea and vomiting)         Current Outpatient Medications   Medication Sig Dispense Refill     diazepam (VALIUM) 5 MG tablet Take one tablet one hour prior to scan. May repeat dose one time. 2 tablet 0     lidocaine (XYLOCAINE) 2 % external gel Apply topically 3 times daily as needed for moderate pain 30 mL 1     lidocaine-prilocaine (EMLA) 2.5-2.5 % external cream Apply topically as needed for moderate pain Apply to PORT site 45 minutes prior to procedure.  Cover with a non-absorbent dressing. 30 g 4     LORazepam (ATIVAN) 0.5 MG tablet        magic mouthwash suspension, diphenhydrAMINE, lidocaine, aluminum-magnesium & simethicone, (FIRST-MOUTHWASH BLM) compounding kit Swish and swallow 5-10 mLs in mouth every 6 hours as needed for mouth sores 237 mL 1     methylphenidate (RITALIN) 5 MG tablet Take 0.5-1 tablets (2.5-5 mg) by mouth 2 times daily as needed (fatigue) Take no later than 2pm 60 tablet 0     morphine (MSIR) 15 MG IR tablet Take 0.5-1 tablets (7.5-15 mg) by mouth every 4 hours as needed for severe pain 45 tablet 0     order for DME Equipment: Wig  Diagnosis: cranial alopecia 1 each 0     predniSONE (DELTASONE) 20 MG tablet Take 1 tablet (20 mg) by mouth daily 5 tablet 0     Vaginal Lubricant (REPLENS) GEL Apply vaginally as needed 35 g 1          Allergies   Allergen Reactions     Codeine Other (See Comments)     Got hyper       Exam:  There were " no vitals taken for this visit.  Wt Readings from Last 4 Encounters:   10/30/20 49.7 kg (109 lb 9.6 oz)   10/09/20 47.2 kg (104 lb)   09/28/20 46.4 kg (102 lb 3.2 oz)   09/18/20 49.1 kg (108 lb 3.2 oz)   GENERAL: Healthy, alert and no distress  EYES: Eyes grossly normal to inspection.  No discharge or erythema, or obvious scleral/conjunctival abnormalities.  RESP: No audible wheeze, cough, or visible cyanosis.  No visible retractions or increased work of breathing.    SKIN: Visible skin clear. No significant rash, abnormal pigmentation or lesions.  NEURO: Cranial nerves grossly intact.  Mentation and speech appropriate for age.  PSYCH: Mentation appears normal, affect normal/bright, judgement and insight intact, normal speech and appearance well-groomed.  Remainder of exam deferred due to the covid pandemic and video visit taking place.    Labs:       Lab Results   Component Value Date    WBC 5.0 10/30/2020     Lab Results   Component Value Date    RBC 2.15 10/30/2020     Lab Results   Component Value Date    HGB 9.1 10/30/2020     Lab Results   Component Value Date    HCT 26.4 10/30/2020     No components found for: MCT  Lab Results   Component Value Date     10/30/2020     Lab Results   Component Value Date    MCH 42.3 10/30/2020     Lab Results   Component Value Date    MCHC 34.5 10/30/2020     Lab Results   Component Value Date    RDW 17.3 10/30/2020     Lab Results   Component Value Date    PLT 86 10/30/2020       Recent Labs   Lab Test 10/30/20  0930 10/09/20  0954    138   POTASSIUM 3.9 3.7   CHLORIDE 106 106   CO2 30 29   ANIONGAP 4 4   GLC 94 99   BUN 6* 9   CR 0.60 0.57   URVASHI 8.7 8.9     Liver Function Studies -   Recent Labs   Lab Test 10/30/20  0930   PROTTOTAL 7.0   ALBUMIN 3.8   BILITOTAL 0.3   ALKPHOS 59   AST 16   ALT 21     Pathology report - by her description, she had a pCR.  I am waiting for the final report.    Assessment/plan:   1.) ER/MI +, HER-2 positive R upper/inner quadrant  breast cancer.   Clinically T2N0  On pathology report, she had      She understands she will need adjuvant herceptin and perjeta IV every three weeks to complete one year, as well as adjuvant endocrine therapy.      We discussed that she will need adjuvant tamoxifen.  Reviewed side effects.  Script sent out.      I do not plan to reload her today, as last infusion was 10/30/20.    She will need an echo every 3 months.     2.) Fatigue  -2/2 chemotherapy.  Significantly improving.    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    4.) Diarrhea - resolved.  She is eating well.  Weight has improved.        Lucita Gerardo MD        Pathology report confirmed pCR .  There was no evidence of disease in her breast or axilla at the time of surgery on 11/12/20.  Will continue with plan of care as outlined

## 2020-11-23 NOTE — PROGRESS NOTES
Infusion Nursing Note:  Leah Rangel presents today for Cycle 1 Day 1 Perjeta + Herceptin   Patient seen by provider today: Yes: Dr. Gerardo   present during visit today: Not Applicable.    Note: Leah presents to infusion today stating she feels well. She denies any new symptoms or concerns today. She rates her incisional pain from recent surgery a 6/10, but stated she'll be meeting with her surgeon later today about post-op pain management. Denied need for pain intervention.      Pt states she is feeling frustrated with getting poked after port removed in surgery and would like to be scheduled for a new port placement-- message sent to Dr. Gerardo and her care coordinator.    Intravenous Access:   Peripheral IV placed.     Treatment Conditions:  Lab Results   Component Value Date    HGB 11.2 11/23/2020     Lab Results   Component Value Date    WBC 8.7 11/23/2020      Lab Results   Component Value Date    ANEU 5.5 11/23/2020     Lab Results   Component Value Date     11/23/2020      Lab Results   Component Value Date     11/23/2020                   Lab Results   Component Value Date    POTASSIUM 4.2 11/23/2020           No results found for: MAG         Lab Results   Component Value Date    CR 0.57 11/23/2020                   Lab Results   Component Value Date    URVASHI 9.6 11/23/2020                Lab Results   Component Value Date    BILITOTAL 0.2 11/23/2020           Lab Results   Component Value Date    ALBUMIN 3.6 11/23/2020                    Lab Results   Component Value Date    ALT 17 11/23/2020           Lab Results   Component Value Date    AST 14 11/23/2020       Results reviewed, labs MET treatment parameters, ok to proceed with treatment.  ECHO/MUGA completed 11/6/2020 -- EF 60-65%.    Post Infusion Assessment:  Patient tolerated infusion without incident.  Blood return noted pre and post infusion.       Discharge Plan:   Prescription refills given for Compazine and  Uneventful course  Discharge home when meets discharge criteria Ativan.  Discharge instructions reviewed with: Patient.  Patient and/or family verbalized understanding of discharge instructions and all questions answered.  Copy of AVS reviewed with patient and/or family. Next infusion appointment is 12/14/2020.  Patient discharged in stable condition accompanied by: self.  Departure Mode: Ambulatory.  Face to Face time: 0.    Selene Bledsoe RN

## 2020-11-24 ENCOUNTER — PATIENT OUTREACH (OUTPATIENT)
Dept: ONCOLOGY | Facility: CLINIC | Age: 40
End: 2020-11-24

## 2020-11-24 DIAGNOSIS — Z17.0 MALIGNANT NEOPLASM OF UPPER-INNER QUADRANT OF RIGHT BREAST IN FEMALE, ESTROGEN RECEPTOR POSITIVE (H): Primary | ICD-10-CM

## 2020-11-24 DIAGNOSIS — Z11.59 SCREENING FOR VIRAL DISEASE: ICD-10-CM

## 2020-11-24 DIAGNOSIS — C50.211 MALIGNANT NEOPLASM OF UPPER-INNER QUADRANT OF RIGHT BREAST IN FEMALE, ESTROGEN RECEPTOR POSITIVE (H): Primary | ICD-10-CM

## 2020-11-25 DIAGNOSIS — Z11.59 ENCOUNTER FOR SCREENING FOR OTHER VIRAL DISEASES: Primary | ICD-10-CM

## 2020-11-27 DIAGNOSIS — Z11.59 ENCOUNTER FOR SCREENING FOR OTHER VIRAL DISEASES: Primary | ICD-10-CM

## 2020-11-29 ENCOUNTER — HEALTH MAINTENANCE LETTER (OUTPATIENT)
Age: 40
End: 2020-11-29

## 2020-12-11 DIAGNOSIS — Z11.59 ENCOUNTER FOR SCREENING FOR OTHER VIRAL DISEASES: ICD-10-CM

## 2020-12-11 PROCEDURE — U0003 INFECTIOUS AGENT DETECTION BY NUCLEIC ACID (DNA OR RNA); SEVERE ACUTE RESPIRATORY SYNDROME CORONAVIRUS 2 (SARS-COV-2) (CORONAVIRUS DISEASE [COVID-19]), AMPLIFIED PROBE TECHNIQUE, MAKING USE OF HIGH THROUGHPUT TECHNOLOGIES AS DESCRIBED BY CMS-2020-01-R: HCPCS | Mod: 90 | Performed by: PATHOLOGY

## 2020-12-11 PROCEDURE — 99000 SPECIMEN HANDLING OFFICE-LAB: CPT | Performed by: PATHOLOGY

## 2020-12-12 LAB
SARS-COV-2 RNA SPEC QL NAA+PROBE: NOT DETECTED
SPECIMEN SOURCE: NORMAL

## 2020-12-14 ENCOUNTER — ANESTHESIA EVENT (OUTPATIENT)
Dept: SURGERY | Facility: AMBULATORY SURGERY CENTER | Age: 40
End: 2020-12-14

## 2020-12-15 ENCOUNTER — ANCILLARY PROCEDURE (OUTPATIENT)
Dept: RADIOLOGY | Facility: AMBULATORY SURGERY CENTER | Age: 40
End: 2020-12-15
Attending: INTERNAL MEDICINE
Payer: COMMERCIAL

## 2020-12-15 ENCOUNTER — HOSPITAL ENCOUNTER (OUTPATIENT)
Facility: AMBULATORY SURGERY CENTER | Age: 40
Discharge: HOME | End: 2020-12-15
Attending: PHYSICIAN ASSISTANT | Admitting: PHYSICIAN ASSISTANT
Payer: COMMERCIAL

## 2020-12-15 ENCOUNTER — ANESTHESIA (OUTPATIENT)
Dept: SURGERY | Facility: AMBULATORY SURGERY CENTER | Age: 40
End: 2020-12-15

## 2020-12-15 VITALS
WEIGHT: 105 LBS | HEART RATE: 74 BPM | DIASTOLIC BLOOD PRESSURE: 61 MMHG | SYSTOLIC BLOOD PRESSURE: 108 MMHG | RESPIRATION RATE: 18 BRPM | TEMPERATURE: 99 F | OXYGEN SATURATION: 98 % | HEIGHT: 64 IN | BODY MASS INDEX: 17.93 KG/M2

## 2020-12-15 DIAGNOSIS — Z17.0 MALIGNANT NEOPLASM OF UPPER-INNER QUADRANT OF RIGHT BREAST IN FEMALE, ESTROGEN RECEPTOR POSITIVE (H): ICD-10-CM

## 2020-12-15 DIAGNOSIS — C50.211 MALIGNANT NEOPLASM OF UPPER-INNER QUADRANT OF RIGHT BREAST IN FEMALE, ESTROGEN RECEPTOR POSITIVE (H): ICD-10-CM

## 2020-12-15 LAB
B-HCG SERPL-ACNC: <1 IU/L (ref 0–5)
INR PPP: 1.05 (ref 0.86–1.14)

## 2020-12-15 PROCEDURE — 77001 FLUOROGUIDE FOR VEIN DEVICE: CPT | Mod: 26 | Performed by: PHYSICIAN ASSISTANT

## 2020-12-15 PROCEDURE — 36561 INSERT TUNNELED CV CATH: CPT | Performed by: PHYSICIAN ASSISTANT

## 2020-12-15 PROCEDURE — 76937 US GUIDE VASCULAR ACCESS: CPT | Mod: 26 | Performed by: PHYSICIAN ASSISTANT

## 2020-12-15 DEVICE — CATH PORT POWERPORT CLEARVUE SLIM 6FR 5616000: Type: IMPLANTABLE DEVICE | Site: CHEST | Status: FUNCTIONAL

## 2020-12-15 RX ORDER — ONDANSETRON 4 MG/1
4 TABLET, ORALLY DISINTEGRATING ORAL EVERY 30 MIN PRN
Status: DISCONTINUED | OUTPATIENT
Start: 2020-12-15 | End: 2020-12-16 | Stop reason: HOSPADM

## 2020-12-15 RX ORDER — SODIUM CHLORIDE, SODIUM LACTATE, POTASSIUM CHLORIDE, CALCIUM CHLORIDE 600; 310; 30; 20 MG/100ML; MG/100ML; MG/100ML; MG/100ML
INJECTION, SOLUTION INTRAVENOUS CONTINUOUS
Status: DISCONTINUED | OUTPATIENT
Start: 2020-12-15 | End: 2020-12-16 | Stop reason: HOSPADM

## 2020-12-15 RX ORDER — CEFAZOLIN SODIUM 2 G/50ML
2 SOLUTION INTRAVENOUS
Status: COMPLETED | OUTPATIENT
Start: 2020-12-15 | End: 2020-12-15

## 2020-12-15 RX ORDER — NALOXONE HYDROCHLORIDE 0.4 MG/ML
0.4 INJECTION, SOLUTION INTRAMUSCULAR; INTRAVENOUS; SUBCUTANEOUS
Status: DISCONTINUED | OUTPATIENT
Start: 2020-12-15 | End: 2020-12-16 | Stop reason: HOSPADM

## 2020-12-15 RX ORDER — GABAPENTIN 300 MG/1
300 CAPSULE ORAL ONCE
Status: COMPLETED | OUTPATIENT
Start: 2020-12-15 | End: 2020-12-15

## 2020-12-15 RX ORDER — MEPERIDINE HYDROCHLORIDE 25 MG/ML
12.5 INJECTION INTRAMUSCULAR; INTRAVENOUS; SUBCUTANEOUS
Status: DISCONTINUED | OUTPATIENT
Start: 2020-12-15 | End: 2020-12-16 | Stop reason: HOSPADM

## 2020-12-15 RX ORDER — ACETAMINOPHEN 325 MG/1
975 TABLET ORAL ONCE
Status: COMPLETED | OUTPATIENT
Start: 2020-12-15 | End: 2020-12-15

## 2020-12-15 RX ORDER — NALOXONE HYDROCHLORIDE 0.4 MG/ML
0.2 INJECTION, SOLUTION INTRAMUSCULAR; INTRAVENOUS; SUBCUTANEOUS
Status: DISCONTINUED | OUTPATIENT
Start: 2020-12-15 | End: 2020-12-16 | Stop reason: HOSPADM

## 2020-12-15 RX ORDER — LIDOCAINE 40 MG/G
CREAM TOPICAL
Status: DISCONTINUED | OUTPATIENT
Start: 2020-12-15 | End: 2020-12-16 | Stop reason: HOSPADM

## 2020-12-15 RX ORDER — ONDANSETRON 2 MG/ML
INJECTION INTRAMUSCULAR; INTRAVENOUS PRN
Status: DISCONTINUED | OUTPATIENT
Start: 2020-12-15 | End: 2020-12-15

## 2020-12-15 RX ORDER — LIDOCAINE HYDROCHLORIDE 20 MG/ML
INJECTION, SOLUTION INFILTRATION; PERINEURAL PRN
Status: DISCONTINUED | OUTPATIENT
Start: 2020-12-15 | End: 2020-12-15

## 2020-12-15 RX ORDER — HEPARIN SODIUM,PORCINE 10 UNIT/ML
5 VIAL (ML) INTRAVENOUS EVERY 24 HOURS
Status: DISCONTINUED | OUTPATIENT
Start: 2020-12-15 | End: 2020-12-16 | Stop reason: HOSPADM

## 2020-12-15 RX ORDER — PROPOFOL 10 MG/ML
INJECTION, EMULSION INTRAVENOUS PRN
Status: DISCONTINUED | OUTPATIENT
Start: 2020-12-15 | End: 2020-12-15

## 2020-12-15 RX ORDER — PROPOFOL 10 MG/ML
INJECTION, EMULSION INTRAVENOUS CONTINUOUS PRN
Status: DISCONTINUED | OUTPATIENT
Start: 2020-12-15 | End: 2020-12-15

## 2020-12-15 RX ORDER — ONDANSETRON 2 MG/ML
4 INJECTION INTRAMUSCULAR; INTRAVENOUS EVERY 30 MIN PRN
Status: DISCONTINUED | OUTPATIENT
Start: 2020-12-15 | End: 2020-12-16 | Stop reason: HOSPADM

## 2020-12-15 RX ORDER — HEPARIN SODIUM (PORCINE) LOCK FLUSH IV SOLN 100 UNIT/ML 100 UNIT/ML
5 SOLUTION INTRAVENOUS
Status: DISCONTINUED | OUTPATIENT
Start: 2020-12-15 | End: 2020-12-16 | Stop reason: HOSPADM

## 2020-12-15 RX ADMIN — PROPOFOL 200 MCG/KG/MIN: 10 INJECTION, EMULSION INTRAVENOUS at 09:05

## 2020-12-15 RX ADMIN — ONDANSETRON 4 MG: 2 INJECTION INTRAMUSCULAR; INTRAVENOUS at 09:05

## 2020-12-15 RX ADMIN — GABAPENTIN 300 MG: 300 CAPSULE ORAL at 08:16

## 2020-12-15 RX ADMIN — CEFAZOLIN SODIUM 2 G: 2 SOLUTION INTRAVENOUS at 09:09

## 2020-12-15 RX ADMIN — SODIUM CHLORIDE, SODIUM LACTATE, POTASSIUM CHLORIDE, CALCIUM CHLORIDE: 600; 310; 30; 20 INJECTION, SOLUTION INTRAVENOUS at 09:00

## 2020-12-15 RX ADMIN — ACETAMINOPHEN 975 MG: 325 TABLET ORAL at 08:16

## 2020-12-15 RX ADMIN — LIDOCAINE HYDROCHLORIDE 25 MG: 20 INJECTION, SOLUTION INFILTRATION; PERINEURAL at 09:05

## 2020-12-15 RX ADMIN — PROPOFOL 30 MG: 10 INJECTION, EMULSION INTRAVENOUS at 09:14

## 2020-12-15 ASSESSMENT — MIFFLIN-ST. JEOR: SCORE: 1126.28

## 2020-12-15 NOTE — ANESTHESIA PREPROCEDURE EVALUATION
"Anesthesia Pre-Procedure Evaluation    Patient: Leah Rangel   MRN:     [id number] Gender:   female   Age:    40 year old :      1980        Preoperative Diagnosis: Malignant neoplasm of upper-inner quadrant of right breast in female, estrogen receptor positive (H) [C50.211, Z17.0]   Procedure(s):  CHEST INSERTION, VASCULAR ACCESS PORT     LABS:  CBC:   Lab Results   Component Value Date    WBC 8.7 2020    WBC 5.0 10/30/2020    HGB 11.2 (L) 2020    HGB 9.1 (L) 10/30/2020    HCT 32.8 (L) 2020    HCT 26.4 (L) 10/30/2020     2020    PLT 86 (L) 10/30/2020     BMP:   Lab Results   Component Value Date     (L) 2020     10/30/2020    POTASSIUM 4.2 2020    POTASSIUM 3.9 10/30/2020    CHLORIDE 97 2020    CHLORIDE 106 10/30/2020    CO2 30 2020    CO2 30 10/30/2020    BUN 5 (L) 2020    BUN 6 (L) 10/30/2020    CR 0.57 2020    CR 0.60 10/30/2020    GLC 92 2020    GLC 94 10/30/2020     COAGS:   Lab Results   Component Value Date    INR 0.96 07/15/2020     POC: No results found for: BGM, HCG, HCGS  OTHER:   Lab Results   Component Value Date    URVASHI 9.6 2020    ALBUMIN 3.6 2020    PROTTOTAL 7.5 2020    ALT 17 2020    AST 14 2020    ALKPHOS 82 2020    BILITOTAL 0.2 2020        Preop Vitals    BP Readings from Last 3 Encounters:   20 110/73   10/30/20 110/72   10/09/20 117/73    Pulse Readings from Last 3 Encounters:   20 78   10/30/20 101   10/09/20 95      Resp Readings from Last 3 Encounters:   20 16   10/30/20 14   10/09/20 18    SpO2 Readings from Last 3 Encounters:   20 98%   10/30/20 98%   10/09/20 97%      Temp Readings from Last 1 Encounters:   20 36.8  C (98.2  F) (Oral)    Ht Readings from Last 1 Encounters:   20 1.626 m (5' 4\")      Wt Readings from Last 1 Encounters:   20 49.4 kg (109 lb)    Estimated body mass index is 18.71 kg/m  as " "calculated from the following:    Height as of 7/17/20: 1.626 m (5' 4\").    Weight as of 11/23/20: 49.4 kg (109 lb).     LDA:  Port A Cath Single 07/15/20 Left Chest wall (Active)   Number of days: 152        Past Medical History:   Diagnosis Date     Breast cancer (H)      Complication of anesthesia      PONV (postoperative nausea and vomiting)       Past Surgical History:   Procedure Laterality Date     INSERT PORT VASCULAR ACCESS Left 7/15/2020    Procedure: Port Placement;  Surgeon: Julio Banks MD;  Location: [facility] OR     IR CHEST PORT PLACEMENT > 5 YRS OF AGE  7/15/2020      Allergies   Allergen Reactions     Codeine Other (See Comments)     Got hyper        Anesthesia Evaluation     . Pt has had prior anesthetic.     History of anesthetic complications   - PONV        ROS/MED HX    ENT/Pulmonary:       Neurologic:       Cardiovascular:         METS/Exercise Tolerance:     Hematologic:         Musculoskeletal:         GI/Hepatic:         Renal/Genitourinary:         Endo:         Psychiatric:     (+) psychiatric history anxiety and depression      Infectious Disease:         Malignancy:         Other:                     JZG FV AN PHYSICAL EXAM    Assessment:   ASA SCORE: 3            Plan:   Anes. Type:  MAC   Pre-Medication: None   Induction:  N/a   Airway: Native Airway   Access/Monitoring: PIV   Maintenance: N/a     Postop Plan:   Postop Pain: None  Postop Sedation/Airway: Not planned     PONV Management:   Adult Risk Factors: Female, H/o PONV or Motion Sickness                   Yomaira Wheeler MD  "

## 2020-12-15 NOTE — DISCHARGE INSTRUCTIONS
A collaboration between [facility] and [facility]  Experts in minimally invasive, targeted treatments performed using imaging guidance    Venous Access Device,  Port Catheter or Tunneled or Non-Tunneled Central Line Placement    Today you had a procedure today to install a venous access device; either a tunneled central vein catheter or a subcutaneous port catheter.    After you go home:  - Drink plenty of fluids.  Generally 6-8 (8 ounce) glasses a day is recommended.  - Resume your regular diet unless otherwise ordered by a medical provider.  - Keep any applied tape/gauze dressings clean and dry.  Change tape/gauze dressings if they get wet or soiled.  - You may shower the following day after procedure, however cover and protect from moisture any tape/gauze dressings.  You may let water hit and run over dried skin glue, but do not scrub.  Pat the area dry after showering.  - Port placement incisions are closed with absorbable suture, meaning they do not need to be removed at a later date, and a topical skin adhesive (skin glue).  This glue will wear off in 7-14 days.  Do not remove before this time.  If 14 days have passed and residual glue is present, you may gently remove it.  - Do not apply gels, lotions, or ointments to the glue site for the first 10 days as this may cause the glue to prematurely soften and fail.  - Do not perform strenuous activities or lift greater than 10 pounds for the next three days.  - If there is bleeding or oozing from the procedure site, apply firm pressure to the area for 5-10 minutes.  If the bleeding continues seek medical advice at the numbers below.  - Mild procedure site discomfort can be treated with an ice pack and over-the-counter pain relievers.        For 24 hours after any sedation used:  - Relax and take it easy.  No strenuous activities.  - Do not drive or operate machines at home or at work.  - No alcohol  consumption.  - Do not make any important or legal decisions.    Call our Interventional Radiology (IR) service if:  - If you start bleeding from the procedure site.  If you do start to bleed from the site, lie down and hold some pressure on the site.  Our radiology provider can help you decide if you need to return to the hospital.  - If you have new or worsening pain related to the procedure.  - If you have concerning swelling at the procedure site.  - If you develop persistent nausea or vomiting.  - If you develop hives or a rash or any unexplained itching.  - If you have a fever of greater than 100.5  F and chills in the first 5 days after procedure.  - Any other concerns related to your procedure.      [facility]  Interventional Radiology (IR)  [address]  [address] [place] [address]    Contact Number:  [phone]  (IR control desk)  - Monday - Friday 8:00 am - 4:30 pm    After hours for urgent concerns:  [phone]  - After 4:30 pm Monday - Friday, Weekends and Holidays.   - Ask for Interventional Radiology on-call.  Someone is available 24 hours a day.  - [facility] toll free number:  [phone]    [facility]  Home Care Following Anesthesia  For 24 hours after surgery:  1. Get plenty of rest.  A responsible adult must stay with you for at least 24 hours after you leave the surgery center.  2. Do not drive or use heavy equipment.  If you have weakness or tingling, don't drive or use heavy equipment until this feeling goes away.   3. Do not drink alcohol.   4. Avoid strenuous or risky activities.  Ask for help when climbing stairs.  5. You may feel lightheaded.  IF so, sit for a few minutes before standing.  Have someone help you get up.   6. If you have nausea (feel sick to your stomach): Drink only clear liquids such as apple juice, ginger ale, broth or 7-Up.  Rest may also help.  Be sure to drink enough fluids.   Move to a regular diet as you feel able.   7. You may have a slight fever.  Call the doctor if your fever is over 100 F (37.7 C) (taken under the tongue) or lasts longer than 24 hours.  8. You may have a dry mouth, a sore throat, muscle aches or trouble sleeping. These should go away after 24 hours.  9. Do not make important or legal decisions.               Tips for taking pain medications  To get the best pain relief possible, remember these points:    Take pain medications as directed, before pain becomes severe.    Pain medication can upset your stomach: taking it with food may help.    Constipation is a common side effect of pain medication. Drink plenty of  fluids.    Eat foods high in fiber. Take a stool softener if recommended by your doctor or pharmacist.    Do not drink alcohol, drive or operate machinery while taking pain medications.    Ask about other ways to control pain, such as with heat, ice or relaxation.    Tylenol/Acetaminophen Consumption  To help encourage the safe use of acetaminophen, the makers of TYLENOL  have lowered the maximum daily dose for single-ingredient Extra Strength TYLENOL  (acetaminophen) products sold in the [place] from 8 pills per day (4,000 mg) to 6 pills per day (3,000 mg). The dosing interval has also changed from 2 pills every 4-6 hours to 2 pills every 6 hours.    If you feel your pain relief is insufficient, you may take Tylenol/Acetaminophen in addition to your narcotic pain medication.     Be careful not to exceed 3,000 mg of Tylenol/Acetaminophen in a 24 hour period from all sources.    If you are taking extra strength Tylenol/acetaminophen (500 mg), the maximum dose is 6 tablets in 24 hours.    If you are taking regular strength acetaminophen (325 mg), the maximum dose is 9 tablets in 24 hours.    Call a doctor for any of the followin. Signs of infection (fever, growing tenderness at the surgery site, a large amount of drainage or bleeding, severe pain,  foul-smelling drainage, redness, swelling).  2. It has been over 8 to 10 hours since surgery and you are still not able to urinate (pass water).  3. Headache for over 24 hours.  4. Numbness, tingling or weakness the day after surgery (if you had spinal anesthesia).  5. Signs of Covid-19 infection (temperature over 100 degrees, shortness of breath, cough, loss of taste/smell, generalized body aches, persistent headache, chills, sore throat, nausea/vomiting/diarrhea)  Your doctor is:  Everett ROGERS  [phone]  Or dial [phone] and ask for the resident on call for:  Interventional Radiology  For emergency care, call the:  [place] [facility]:  [phone] (TTY for hearing impaired: [phone])

## 2020-12-15 NOTE — ANESTHESIA POSTPROCEDURE EVALUATION
Anesthesia POST Procedure Evaluation    Patient: Leah Rangel   MRN:     [id number] Gender:   female   Age:    40 year old :      1980        Preoperative Diagnosis: Malignant neoplasm of upper-inner quadrant of right breast in female, estrogen receptor positive (H) [C50.211, Z17.0]   Procedure(s):  CHEST INSERTION, VASCULAR ACCESS PORT   Postop Comments: No value filed.     Anesthesia Type: MAC       Disposition: Outpatient   Postop Pain Control: Uneventful            Sign Out: Well controlled pain   PONV: No   Neuro/Psych: Uneventful            Sign Out: Acceptable/Baseline neuro status   Airway/Respiratory: Uneventful            Sign Out: Acceptable/Baseline resp. status   CV/Hemodynamics: Uneventful            Sign Out: Acceptable CV status   Other NRE: NONE   DID A NON-ROUTINE EVENT OCCUR? No         Last Anesthesia Record Vitals:  CRNA VITALS  12/15/2020 0911 - 12/15/2020 1011      12/15/2020             Pulse:  78    SpO2:  99 %    Resp Rate (observed):  (!) 2    Resp Rate (set):  10          Last PACU Vitals:  Vitals Value Taken Time   /75 12/15/20 0945   Temp 37.1  C (98.7  F) 12/15/20 0945   Pulse 72 12/15/20 0945   Resp 16 12/15/20 0945   SpO2 98 % 12/15/20 0945   Temp src     NIBP     Pulse     SpO2     Resp     Temp     Ht Rate     Temp 2           Electronically Signed By: Yomaira Wheeler MD, December 15, 2020, 1:51 PM

## 2020-12-15 NOTE — PROCEDURES
Interventional Radiology Brief Post Procedure Note    Procedure: IR Chest Port Placement    Proceduralist: Everett Long PA-C    Assistant: None    Time Out: Prior to the start of the procedure and with procedural staff participation, I verbally confirmed the patient s identity using two indicators, relevant allergies, that the procedure was appropriate and matched the consent or emergent situation, and that the correct equipment/implants were available. Immediately prior to starting the procedure I conducted the Time Out with the procedural staff and re-confirmed the patient s name, procedure, and site/side. (The Joint Commission universal protocol was followed.)  Yes        Sedation: Monitored Anesthesia Care (MAC) administered and documented by Anesthesia Care Provider    Findings: Completed image-guided placement of 6 Ukrainian 20.5 cm single lumen power-injectable central venous port via right IJ. Aspirates and flushes freely, heparin locked and is ready for immediate use.      Estimated Blood Loss: Minimal    Fluoroscopy Time:  0.3 minute(s)    SPECIMENS: None    Complications: 1. None     Condition: Stable    Plan: Follow-up per primary team.     Comments: See dictated procedure note for full details.    Everett Long PA-C

## 2020-12-15 NOTE — ANESTHESIA CARE TRANSFER NOTE
Patient: Leah Rangel    Procedure(s):  CHEST INSERTION, VASCULAR ACCESS PORT    Diagnosis: Malignant neoplasm of upper-inner quadrant of right breast in female, estrogen receptor positive (H) [C50.211, Z17.0]  Diagnosis Additional Information: No value filed.    Anesthesia Type:   MAC     Note:    Patient transferred to:Phase II  Handoff Report: Identifed the Patient, Identified the Reponsible Provider, Reviewed the pertinent medical history, Discussed the surgical course, Reviewed Intra-OP anesthesia mangement and issues during anesthesia, Set expectations for post-procedure period and Allowed opportunity for questions and acknowledgement of understanding      Vitals: (Last set prior to Anesthesia Care Transfer)    CRNA VITALS  12/15/2020 0911 - 12/15/2020 0941      12/15/2020             Pulse:  78    SpO2:  99 %    Resp Rate (observed):  (!) 2    Resp Rate (set):  10                Electronically Signed By: ADAMA Johnson CRNA  December 15, 2020  9:41 AM

## 2020-12-16 DIAGNOSIS — C50.211 MALIGNANT NEOPLASM OF UPPER-INNER QUADRANT OF RIGHT BREAST IN FEMALE, ESTROGEN RECEPTOR POSITIVE (H): Primary | ICD-10-CM

## 2020-12-16 DIAGNOSIS — Z17.0 MALIGNANT NEOPLASM OF UPPER-INNER QUADRANT OF RIGHT BREAST IN FEMALE, ESTROGEN RECEPTOR POSITIVE (H): Primary | ICD-10-CM

## 2020-12-16 RX ORDER — LORAZEPAM 2 MG/ML
0.5 INJECTION INTRAMUSCULAR EVERY 4 HOURS PRN
Status: CANCELLED
Start: 2020-12-18

## 2020-12-16 RX ORDER — SODIUM CHLORIDE 9 MG/ML
1000 INJECTION, SOLUTION INTRAVENOUS CONTINUOUS PRN
Status: CANCELLED
Start: 2020-12-18

## 2020-12-16 RX ORDER — METHYLPREDNISOLONE SODIUM SUCCINATE 125 MG/2ML
125 INJECTION, POWDER, LYOPHILIZED, FOR SOLUTION INTRAMUSCULAR; INTRAVENOUS
Status: CANCELLED
Start: 2020-12-18

## 2020-12-16 RX ORDER — EPINEPHRINE 1 MG/ML
0.3 INJECTION, SOLUTION INTRAMUSCULAR; SUBCUTANEOUS EVERY 5 MIN PRN
Status: CANCELLED | OUTPATIENT
Start: 2020-12-18

## 2020-12-16 RX ORDER — MEPERIDINE HYDROCHLORIDE 25 MG/ML
25 INJECTION INTRAMUSCULAR; INTRAVENOUS; SUBCUTANEOUS EVERY 30 MIN PRN
Status: CANCELLED | OUTPATIENT
Start: 2020-12-18

## 2020-12-16 RX ORDER — ALBUTEROL SULFATE 90 UG/1
1-2 AEROSOL, METERED RESPIRATORY (INHALATION)
Status: CANCELLED
Start: 2020-12-18

## 2020-12-16 RX ORDER — ALBUTEROL SULFATE 0.83 MG/ML
2.5 SOLUTION RESPIRATORY (INHALATION)
Status: CANCELLED | OUTPATIENT
Start: 2020-12-18

## 2020-12-16 RX ORDER — HEPARIN SODIUM,PORCINE 10 UNIT/ML
5 VIAL (ML) INTRAVENOUS
Status: CANCELLED | OUTPATIENT
Start: 2020-12-18

## 2020-12-16 RX ORDER — HEPARIN SODIUM (PORCINE) LOCK FLUSH IV SOLN 100 UNIT/ML 100 UNIT/ML
5 SOLUTION INTRAVENOUS
Status: CANCELLED | OUTPATIENT
Start: 2020-12-18

## 2020-12-16 RX ORDER — DIPHENHYDRAMINE HYDROCHLORIDE 50 MG/ML
50 INJECTION INTRAMUSCULAR; INTRAVENOUS
Status: CANCELLED
Start: 2020-12-18

## 2020-12-16 RX ORDER — ACETAMINOPHEN 325 MG/1
650 TABLET ORAL
Status: CANCELLED
Start: 2020-12-18

## 2020-12-16 RX ORDER — NALOXONE HYDROCHLORIDE 0.4 MG/ML
.1-.4 INJECTION, SOLUTION INTRAMUSCULAR; INTRAVENOUS; SUBCUTANEOUS
Status: CANCELLED | OUTPATIENT
Start: 2020-12-18

## 2020-12-16 RX ORDER — DIPHENHYDRAMINE HCL 25 MG
50 CAPSULE ORAL
Status: CANCELLED
Start: 2020-12-18

## 2020-12-18 ENCOUNTER — INFUSION THERAPY VISIT (OUTPATIENT)
Dept: ONCOLOGY | Facility: CLINIC | Age: 40
End: 2020-12-18
Attending: INTERNAL MEDICINE
Payer: COMMERCIAL

## 2020-12-18 VITALS
WEIGHT: 107.1 LBS | RESPIRATION RATE: 16 BRPM | SYSTOLIC BLOOD PRESSURE: 130 MMHG | BODY MASS INDEX: 18.38 KG/M2 | HEART RATE: 97 BPM | DIASTOLIC BLOOD PRESSURE: 86 MMHG | OXYGEN SATURATION: 97 % | TEMPERATURE: 98.4 F

## 2020-12-18 DIAGNOSIS — C50.211 MALIGNANT NEOPLASM OF UPPER-INNER QUADRANT OF RIGHT BREAST IN FEMALE, ESTROGEN RECEPTOR POSITIVE (H): ICD-10-CM

## 2020-12-18 DIAGNOSIS — C50.919 BREAST CANCER (H): Primary | ICD-10-CM

## 2020-12-18 DIAGNOSIS — Z17.0 MALIGNANT NEOPLASM OF UPPER-INNER QUADRANT OF RIGHT BREAST IN FEMALE, ESTROGEN RECEPTOR POSITIVE (H): ICD-10-CM

## 2020-12-18 LAB
ALBUMIN SERPL-MCNC: 3.8 G/DL (ref 3.4–5)
ALP SERPL-CCNC: 82 U/L (ref 40–150)
ALT SERPL W P-5'-P-CCNC: 15 U/L (ref 0–50)
ANION GAP SERPL CALCULATED.3IONS-SCNC: 7 MMOL/L (ref 3–14)
AST SERPL W P-5'-P-CCNC: 15 U/L (ref 0–45)
BASOPHILS # BLD AUTO: 0 10E9/L (ref 0–0.2)
BASOPHILS NFR BLD AUTO: 0.4 %
BILIRUB SERPL-MCNC: 0.4 MG/DL (ref 0.2–1.3)
BUN SERPL-MCNC: 8 MG/DL (ref 7–30)
CALCIUM SERPL-MCNC: 9.2 MG/DL (ref 8.5–10.1)
CHLORIDE SERPL-SCNC: 102 MMOL/L (ref 94–109)
CO2 SERPL-SCNC: 29 MMOL/L (ref 20–32)
CREAT SERPL-MCNC: 0.65 MG/DL (ref 0.52–1.04)
DIFFERENTIAL METHOD BLD: ABNORMAL
EOSINOPHIL # BLD AUTO: 0.3 10E9/L (ref 0–0.7)
EOSINOPHIL NFR BLD AUTO: 2.8 %
ERYTHROCYTE [DISTWIDTH] IN BLOOD BY AUTOMATED COUNT: 13 % (ref 10–15)
GFR SERPL CREATININE-BSD FRML MDRD: >90 ML/MIN/{1.73_M2}
GLUCOSE SERPL-MCNC: 123 MG/DL (ref 70–99)
HCT VFR BLD AUTO: 35.2 % (ref 35–47)
HGB BLD-MCNC: 12.3 G/DL (ref 11.7–15.7)
IMM GRANULOCYTES # BLD: 0 10E9/L (ref 0–0.4)
IMM GRANULOCYTES NFR BLD: 0.4 %
LYMPHOCYTES # BLD AUTO: 2.8 10E9/L (ref 0.8–5.3)
LYMPHOCYTES NFR BLD AUTO: 29.9 %
MCH RBC QN AUTO: 40.9 PG (ref 26.5–33)
MCHC RBC AUTO-ENTMCNC: 34.9 G/DL (ref 31.5–36.5)
MCV RBC AUTO: 117 FL (ref 78–100)
MONOCYTES # BLD AUTO: 0.6 10E9/L (ref 0–1.3)
MONOCYTES NFR BLD AUTO: 6.5 %
NEUTROPHILS # BLD AUTO: 5.7 10E9/L (ref 1.6–8.3)
NEUTROPHILS NFR BLD AUTO: 60 %
NRBC # BLD AUTO: 0 10*3/UL
NRBC BLD AUTO-RTO: 0 /100
PLATELET # BLD AUTO: 237 10E9/L (ref 150–450)
POTASSIUM SERPL-SCNC: 3.8 MMOL/L (ref 3.4–5.3)
PROT SERPL-MCNC: 7.5 G/DL (ref 6.8–8.8)
RBC # BLD AUTO: 3.01 10E12/L (ref 3.8–5.2)
SODIUM SERPL-SCNC: 138 MMOL/L (ref 133–144)
WBC # BLD AUTO: 9.5 10E9/L (ref 4–11)

## 2020-12-18 PROCEDURE — 258N000003 HC RX IP 258 OP 636: Performed by: INTERNAL MEDICINE

## 2020-12-18 PROCEDURE — 80053 COMPREHEN METABOLIC PANEL: CPT | Performed by: INTERNAL MEDICINE

## 2020-12-18 PROCEDURE — 250N000011 HC RX IP 250 OP 636: Performed by: INTERNAL MEDICINE

## 2020-12-18 PROCEDURE — 96417 CHEMO IV INFUS EACH ADDL SEQ: CPT

## 2020-12-18 PROCEDURE — 99207 PR NO BILLABLE SERVICE THIS VISIT: CPT

## 2020-12-18 PROCEDURE — 85025 COMPLETE CBC W/AUTO DIFF WBC: CPT | Performed by: INTERNAL MEDICINE

## 2020-12-18 PROCEDURE — 96413 CHEMO IV INFUSION 1 HR: CPT

## 2020-12-18 RX ORDER — HEPARIN SODIUM,PORCINE 10 UNIT/ML
5 VIAL (ML) INTRAVENOUS
Status: DISCONTINUED | OUTPATIENT
Start: 2020-12-18 | End: 2020-12-18 | Stop reason: HOSPADM

## 2020-12-18 RX ORDER — HEPARIN SODIUM (PORCINE) LOCK FLUSH IV SOLN 100 UNIT/ML 100 UNIT/ML
5 SOLUTION INTRAVENOUS
Status: DISCONTINUED | OUTPATIENT
Start: 2020-12-18 | End: 2020-12-18 | Stop reason: HOSPADM

## 2020-12-18 RX ORDER — HEPARIN SODIUM (PORCINE) LOCK FLUSH IV SOLN 100 UNIT/ML 100 UNIT/ML
5 SOLUTION INTRAVENOUS ONCE
Status: COMPLETED | OUTPATIENT
Start: 2020-12-18 | End: 2020-12-18

## 2020-12-18 RX ADMIN — PERTUZUMAB 420 MG: 30 INJECTION, SOLUTION, CONCENTRATE INTRAVENOUS at 09:29

## 2020-12-18 RX ADMIN — TRASTUZUMAB-ANNS 290 MG: 420 INJECTION, POWDER, LYOPHILIZED, FOR SOLUTION INTRAVENOUS at 10:06

## 2020-12-18 RX ADMIN — Medication 5 ML: at 10:40

## 2020-12-18 RX ADMIN — SODIUM CHLORIDE 250 ML: 9 INJECTION, SOLUTION INTRAVENOUS at 09:29

## 2020-12-18 RX ADMIN — Medication 5 ML: at 08:42

## 2020-12-18 ASSESSMENT — PAIN SCALES - GENERAL: PAINLEVEL: MILD PAIN (2)

## 2020-12-18 NOTE — NURSING NOTE
Chief Complaint   Patient presents with     Port Draw     Labs drawn from port by RN in lab. Vitals taken. Checked into next appointment.      Port accessed with 20 gauge gripper needle by RN in lab.  Port flushed with saline and heparin.  Vital signs taken.  Pt checked in to next appointment.    Evy Gaffney RN

## 2020-12-18 NOTE — PROGRESS NOTES
Infusion Nursing Note:  Leah Rangel presents for C2 Perjeta, Herceptin    Note: pt feeling well today, no new issues or concerns to report. Continues to have diarrhea which is not new for her.    Pain: denies    Treatment Conditions:  Lab Results   Component Value Date    HGB 12.3 12/18/2020     Lab Results   Component Value Date    WBC 9.5 12/18/2020      Lab Results   Component Value Date    ANEU 5.7 12/18/2020     Lab Results   Component Value Date     12/18/2020      Lab Results   Component Value Date     12/18/2020                   Lab Results   Component Value Date    POTASSIUM 3.8 12/18/2020           No results found for: MAG         Lab Results   Component Value Date    CR 0.65 12/18/2020                   Lab Results   Component Value Date    URVASHI 9.2 12/18/2020                Lab Results   Component Value Date    BILITOTAL 0.4 12/18/2020           Lab Results   Component Value Date    ALBUMIN 3.8 12/18/2020                    Lab Results   Component Value Date    ALT 15 12/18/2020           Lab Results   Component Value Date    AST 15 12/18/2020       Results reviewed, labs MET treatment parameters, ok to proceed with treatment.  ECHO/MUGA completed 11/6  EF 60-65%.    Pt currently has an echo scheduled for 12/31, writer confirmed with Dr. Gerardo that pt only needs an echo every 3 months, okay to push back until February.    Intravenous Access:  Implanted Port.    Post Infusion Assessment:  Patient tolerated infusion without incident.  Blood return noted pre and post infusion.  No evidence of extravasations.  Access discontinued per protocol.    Discharge Plan:   Prescription refills given for ativan.  Discharge instructions reviewed with: Patient.  Patient and/or family verbalized understanding of discharge instructions and all questions answered.  AVS to patient via [employer].  Patient will return 1/8 for next appointment.   Patient discharged in stable condition accompanied by:  self.    Marine Cedillo, RN, RN

## 2020-12-31 ENCOUNTER — VIRTUAL VISIT (OUTPATIENT)
Dept: ONCOLOGY | Facility: CLINIC | Age: 40
End: 2020-12-31
Attending: NURSE PRACTITIONER
Payer: COMMERCIAL

## 2020-12-31 DIAGNOSIS — C50.211 MALIGNANT NEOPLASM OF UPPER-INNER QUADRANT OF RIGHT BREAST IN FEMALE, ESTROGEN RECEPTOR POSITIVE (H): Primary | ICD-10-CM

## 2020-12-31 DIAGNOSIS — Z17.0 MALIGNANT NEOPLASM OF UPPER-INNER QUADRANT OF RIGHT BREAST IN FEMALE, ESTROGEN RECEPTOR POSITIVE (H): Primary | ICD-10-CM

## 2020-12-31 PROCEDURE — 999N001193 HC VIDEO/TELEPHONE VISIT; NO CHARGE

## 2020-12-31 PROCEDURE — 99214 OFFICE O/P EST MOD 30 MIN: CPT | Mod: 95 | Performed by: NURSE PRACTITIONER

## 2020-12-31 RX ORDER — SODIUM CHLORIDE 9 MG/ML
1000 INJECTION, SOLUTION INTRAVENOUS CONTINUOUS PRN
Status: CANCELLED
Start: 2021-01-08

## 2020-12-31 RX ORDER — HEPARIN SODIUM (PORCINE) LOCK FLUSH IV SOLN 100 UNIT/ML 100 UNIT/ML
5 SOLUTION INTRAVENOUS
Status: CANCELLED | OUTPATIENT
Start: 2021-01-08

## 2020-12-31 RX ORDER — NALOXONE HYDROCHLORIDE 0.4 MG/ML
.1-.4 INJECTION, SOLUTION INTRAMUSCULAR; INTRAVENOUS; SUBCUTANEOUS
Status: CANCELLED | OUTPATIENT
Start: 2021-01-08

## 2020-12-31 RX ORDER — DIPHENHYDRAMINE HCL 25 MG
50 CAPSULE ORAL
Status: CANCELLED
Start: 2021-01-08

## 2020-12-31 RX ORDER — LORAZEPAM 2 MG/ML
0.5 INJECTION INTRAMUSCULAR EVERY 4 HOURS PRN
Status: CANCELLED
Start: 2021-01-08

## 2020-12-31 RX ORDER — HEPARIN SODIUM,PORCINE 10 UNIT/ML
5 VIAL (ML) INTRAVENOUS
Status: CANCELLED | OUTPATIENT
Start: 2021-01-08

## 2020-12-31 RX ORDER — METHYLPREDNISOLONE SODIUM SUCCINATE 125 MG/2ML
125 INJECTION, POWDER, LYOPHILIZED, FOR SOLUTION INTRAMUSCULAR; INTRAVENOUS
Status: CANCELLED
Start: 2021-01-08

## 2020-12-31 RX ORDER — ALBUTEROL SULFATE 90 UG/1
1-2 AEROSOL, METERED RESPIRATORY (INHALATION)
Status: CANCELLED
Start: 2021-01-08

## 2020-12-31 RX ORDER — MEPERIDINE HYDROCHLORIDE 25 MG/ML
25 INJECTION INTRAMUSCULAR; INTRAVENOUS; SUBCUTANEOUS EVERY 30 MIN PRN
Status: CANCELLED | OUTPATIENT
Start: 2021-01-08

## 2020-12-31 RX ORDER — DIPHENHYDRAMINE HYDROCHLORIDE 50 MG/ML
50 INJECTION INTRAMUSCULAR; INTRAVENOUS
Status: CANCELLED
Start: 2021-01-08

## 2020-12-31 RX ORDER — ALBUTEROL SULFATE 0.83 MG/ML
2.5 SOLUTION RESPIRATORY (INHALATION)
Status: CANCELLED | OUTPATIENT
Start: 2021-01-08

## 2020-12-31 RX ORDER — EPINEPHRINE 1 MG/ML
0.3 INJECTION, SOLUTION INTRAMUSCULAR; SUBCUTANEOUS EVERY 5 MIN PRN
Status: CANCELLED | OUTPATIENT
Start: 2021-01-08

## 2020-12-31 RX ORDER — ACETAMINOPHEN 325 MG/1
650 TABLET ORAL
Status: CANCELLED
Start: 2021-01-08

## 2020-12-31 NOTE — LETTER
December 31, 2020    Leah Rangel  [address]  [place] [address]    To Whom It May Concern:    Leah Rangel may return to her regular work schedule with rest as needed due to treatment related fatigue. She is advised to continue to work from home if possible.    Sincerely,      Camila Spicer DNP,RN, CNP

## 2020-12-31 NOTE — LETTER
12/31/2020         RE: Leah Rangel  [address] [place] [address]        Dear Colleague,    Thank you for referring your patient, Leah Rangel, to the [facility]. Please see a copy of my visit note below.    Reason for Visit: follow-up invasive ductal carcinoma of the R breast, grade 3, ER positive, TX positive, HER-2 positive 3+ by IHC    Oncology HPI:   Leah Rangel is a  40 year old, premenopausal female, diagnosed with R breast cancer, T2Nx right breast cancer.      Leah reports that she has a history of bilateral implants.  She noticed a palpable mass involving her right breast in the upper inner quadrant in early 06/2020.  At that time, she went in for an evaluation with a mammogram and ultrasound.  Mammogram on 06/11/2020 revealed a 7 mm mass at the 1 o'clock position approximately 5 cm from the nipple in the upper inner quadrant of the right breast.  There was a second area measuring 6 x 5 x 4 mm at the 1 o'clock position 6 cm from the nipple.  In the left breast, there was an area of microcalcifications.  She was referred for a right breast biopsy.  Both areas at the 1 o'clock position were biopsied.  No lymph nodes were appreciated.  Both of these biopsies revealed an invasive ductal carcinoma, grade 3, ER positive, TX positive, HER-2 positive, 3+ by IHC.  She  was considered to have a T1b multifocal N1 breast cancer.  On further review, it appeared to be T2Nx. She was subsequently referred for a breast MRI.  The BMRI done on 7/10/20 showed a 4.5 x 2.4 x 3 cm mass in the R upper, inner breast. She underwent a PET/CT 7/16 and started neoadjuvant treatment with Docetaxel/Carboplatin/Trastuzumab/Pertuzumab (TCHP) 7/17/20.  Lymph nodes appeared normal on MRI and PET.     She completed five cycles of TCHP 10/9/20, had a sixth cycle with just herceptin and perjeta on 10/30/20.  She had significant problems with fatigue, nausea, weight loss and diarrhea.  As a  result, her treatment was stopped one cycle early.     She underwent bilateral mastectomies and sentinel lymph node biopsy on 11/11/20 with Dr Marquez Vargas and Dr Piotr Ochoa. She had a pathologic CR. She was continued on adjuvant trastuzumab/pertuzumab on 11/23/20 with a plan for one year of adjuvant therapy. She was also started on adjuvant tamoxifen.       Interval history: Leah is doing well. Feels much better on Herceptin/Perjeta than when on chemotherapy. Energy is good. Plans to return to work tomorrow. Is working from home and plans to continue that for the time being. No nausea. Has chronic diarrhea, unchanged. Feels she is managing ok. Not getting dehydrated. Weight is stable. Appetite is good. No nausea/vomiting. No skin rash, bruising, edema. Had her 2nd fill of the expanders. Hoping for reconstruction surgery in April. Has less axillae tightness. Went to PT for a couple sessions, but didn't find it helpful. Is doing her own ROM exercises and not feeling as tight. No arm swelling. Is excited about hair regrowth.  Has noted increased hot flashes. Has some mood irritability as well. Overall, is finding side effects manageable. Had difficulty with IV access, so had a port placed again.    Current Outpatient Medications   Medication Sig Dispense Refill     diazepam (VALIUM) 5 MG tablet Take one tablet one hour prior to scan. May repeat dose one time. (Patient not taking: Reported on 11/23/2020) 2 tablet 0     diphenhydrAMINE-acetaminophen (TYLENOL PM)  MG tablet Take 1 tablet by mouth nightly as needed for sleep       lidocaine (XYLOCAINE) 2 % external gel Apply topically 3 times daily as needed for moderate pain 30 mL 1     lidocaine-prilocaine (EMLA) 2.5-2.5 % external cream Apply topically as needed for moderate pain Apply to PORT site 45 minutes prior to procedure.  Cover with a non-absorbent dressing. 30 g 4     LORazepam (ATIVAN) 0.5 MG tablet        LORazepam (ATIVAN) 0.5 MG tablet Take 1  tablet (0.5 mg) by mouth every 4 hours as needed (Anxiety, Nausea/Vomiting or Sleep) 30 tablet 3     magic mouthwash suspension, diphenhydrAMINE, lidocaine, aluminum-magnesium & simethicone, (FIRST-MOUTHWASH BLM) compounding kit Swish and swallow 5-10 mLs in mouth every 6 hours as needed for mouth sores 237 mL 1     methylphenidate (RITALIN) 5 MG tablet Take 0.5-1 tablets (2.5-5 mg) by mouth 2 times daily as needed (fatigue) Take no later than 2pm 60 tablet 0     morphine (MSIR) 15 MG IR tablet Take 0.5-1 tablets (7.5-15 mg) by mouth every 4 hours as needed for severe pain 45 tablet 0     order for DME Equipment: Wig  Diagnosis: cranial alopecia 1 each 0     predniSONE (DELTASONE) 20 MG tablet Take 1 tablet (20 mg) by mouth daily 5 tablet 0     prochlorperazine (COMPAZINE) 10 MG tablet Take 1 tablet (10 mg) by mouth every 6 hours as needed (Nausea/Vomiting) 30 tablet 3     tamoxifen (NOLVADEX) 20 MG tablet Take 1 tablet (20 mg) by mouth daily 90 tablet 3     Vaginal Lubricant (REPLENS) GEL Apply vaginally as needed 35 g 1          Allergies   Allergen Reactions     Codeine Other (See Comments)     Got hyper         Video physical exam  General: Patient appears well in no acute distress.   Skin: No visualized rash or lesions on visualized skin. Scalp shows short hair.  Eyes: EOMI, no erythema, sclera icterus or discharge noted  Resp: Appears to be breathing comfortably without accessory muscle usage, speaking in full sentences, no cough  MSK: Appears to have normal range of motion based on visualized movements  Neurologic: No apparent tremors, facial movements symmetric  Psych: affect pleasant, engaged, alert and oriented    The rest of a comprehensive physical examination is deferred due to PHE (public health emergency) video restrictions     not currently breastfeeding.  Wt Readings from Last 4 Encounters:   12/18/20 48.6 kg (107 lb 1.6 oz)   12/15/20 47.6 kg (105 lb)   11/23/20 49.4 kg (109 lb)   10/30/20 49.7 kg  "(109 lb 9.6 oz)         Labs: n/a    Imaging: n/a    Impression/plan:   ER/TN +, HER-2 positive R upper/inner quadrant breast cancer. Clinically T2N0. Treated with neoadjuvant TCHP x 6 cycles and herceptin/perjeta alone with cycle 6. S/P bilateral mastecomies and sentinel LN bx on 11/11/20 with a pathologic CR.  -continued with adjuvant herceptin/perjeta on 11/23/20, plan to continue every 3 weeks for a total of 1 year of therapy  -was also initiated on tamoxifen 20 mg daily  -tolerating well, better than when on neoadjuvant chemotherapy. Has anticipated hot flashes, some mood irritability, but managing that ok.   -due for cycle 3 on 1/8/21. Ok to proceed as planned and continue every 3 weeks.. Will have f/u with Dr. Gerardo on 2/11/21. Is anticipating reconstruction in April 2021    Cardiac monitoring while on HER2 therapy  -echo every 3 months, last done November 2020, due again February 2021    Diarrhea  -chronic, managing ok. Use imodium prn. Call if diarrhea is worsening, difficult to control.    Fatigue  -much better since stopping chemotherapy. OK'd to return to work from home.    Nausea s/t chemotherapy--resolved    Peripheral neuropathy s/t chemotherapy  -grade 1, not assess today. Monitor          Leah Rangel is a 40 year old female who is being evaluated via a billable video visit.      The patient has been notified of following:     \"This video visit will be conducted via a call between you and your physician/provider. We have found that certain health care needs can be provided without the need for an in-person physical exam.  This service lets us provide the care you need with a video conversation.  If a prescription is necessary we can send it directly to your pharmacy.  If lab work is needed we can place an order for that and you can then stop by our lab to have the test done at a later time.    Video visits are billed at different rates depending on your insurance coverage.  Please reach out to " "your insurance provider with any questions.    If during the course of the call the physician/provider feels a video visit is not appropriate, you will not be charged for this service.\"    Patient has given verbal consent for Video visit? Yes  How would you like to obtain your AVS? MyChart  If you are dropped from the video visit, the video invite should be resent to: Text to cell phone: [phone]  Will anyone else be joining your video visit? No        Video-Visit Details    Type of service:  Video Visit    Video Start Time: 1257  Video End Time: 1319    Originating Location (pt. Location): Home    Distant Location (provider location):  [facility]     Platform used for Video Visit: ADAMA Domingo CNP, CMA on 12/31/2020 at 12:43 PM      "

## 2020-12-31 NOTE — PROGRESS NOTES
"Leah Rangel is a 40 year old female who is being evaluated via a billable video visit.      The patient has been notified of following:     \"This video visit will be conducted via a call between you and your physician/provider. We have found that certain health care needs can be provided without the need for an in-person physical exam.  This service lets us provide the care you need with a video conversation.  If a prescription is necessary we can send it directly to your pharmacy.  If lab work is needed we can place an order for that and you can then stop by our lab to have the test done at a later time.    Video visits are billed at different rates depending on your insurance coverage.  Please reach out to your insurance provider with any questions.    If during the course of the call the physician/provider feels a video visit is not appropriate, you will not be charged for this service.\"    Patient has given verbal consent for Video visit? Yes  How would you like to obtain your AVS? MyChart  If you are dropped from the video visit, the video invite should be resent to: Text to cell phone: [phone]  Will anyone else be joining your video visit? No        Video-Visit Details    Type of service:  Video Visit    Video Start Time: 1257  Video End Time: 1319    Originating Location (pt. Location): Home    Distant Location (provider location):  [facility]     Platform used for Video Visit: ADAMA Domingo CNP, CMA on 12/31/2020 at 12:43 PM      "

## 2020-12-31 NOTE — PROGRESS NOTES
Reason for Visit: follow-up invasive ductal carcinoma of the R breast, grade 3, ER positive, NV positive, HER-2 positive 3+ by IHC    Oncology HPI:   Leah Rangel is a  40 year old, premenopausal female, diagnosed with R breast cancer, T2Nx right breast cancer.      Leah reports that she has a history of bilateral implants.  She noticed a palpable mass involving her right breast in the upper inner quadrant in early 06/2020.  At that time, she went in for an evaluation with a mammogram and ultrasound.  Mammogram on 06/11/2020 revealed a 7 mm mass at the 1 o'clock position approximately 5 cm from the nipple in the upper inner quadrant of the right breast.  There was a second area measuring 6 x 5 x 4 mm at the 1 o'clock position 6 cm from the nipple.  In the left breast, there was an area of microcalcifications.  She was referred for a right breast biopsy.  Both areas at the 1 o'clock position were biopsied.  No lymph nodes were appreciated.  Both of these biopsies revealed an invasive ductal carcinoma, grade 3, ER positive, NV positive, HER-2 positive, 3+ by IHC.  She  was considered to have a T1b multifocal N1 breast cancer.  On further review, it appeared to be T2Nx. She was subsequently referred for a breast MRI.  The BMRI done on 7/10/20 showed a 4.5 x 2.4 x 3 cm mass in the R upper, inner breast. She underwent a PET/CT 7/16 and started neoadjuvant treatment with Docetaxel/Carboplatin/Trastuzumab/Pertuzumab (TCHP) 7/17/20.  Lymph nodes appeared normal on MRI and PET.     She completed five cycles of TCHP 10/9/20, had a sixth cycle with just herceptin and perjeta on 10/30/20.  She had significant problems with fatigue, nausea, weight loss and diarrhea.  As a result, her treatment was stopped one cycle early.     She underwent bilateral mastectomies and sentinel lymph node biopsy on 11/11/20 with Dr Marquez Vargas and Dr Piotr Ochoa. She had a pathologic CR. She was continued on adjuvant trastuzumab/pertuzumab on  11/23/20 with a plan for one year of adjuvant therapy. She was also started on adjuvant tamoxifen.       Interval history: Leah is doing well. Feels much better on Herceptin/Perjeta than when on chemotherapy. Energy is good. Plans to return to work tomorrow. Is working from home and plans to continue that for the time being. No nausea. Has chronic diarrhea, unchanged. Feels she is managing ok. Not getting dehydrated. Weight is stable. Appetite is good. No nausea/vomiting. No skin rash, bruising, edema. Had her 2nd fill of the expanders. Hoping for reconstruction surgery in April. Has less axillae tightness. Went to PT for a couple sessions, but didn't find it helpful. Is doing her own ROM exercises and not feeling as tight. No arm swelling. Is excited about hair regrowth.  Has noted increased hot flashes. Has some mood irritability as well. Overall, is finding side effects manageable. Had difficulty with IV access, so had a port placed again.    Current Outpatient Medications   Medication Sig Dispense Refill     diazepam (VALIUM) 5 MG tablet Take one tablet one hour prior to scan. May repeat dose one time. (Patient not taking: Reported on 11/23/2020) 2 tablet 0     diphenhydrAMINE-acetaminophen (TYLENOL PM)  MG tablet Take 1 tablet by mouth nightly as needed for sleep       lidocaine (XYLOCAINE) 2 % external gel Apply topically 3 times daily as needed for moderate pain 30 mL 1     lidocaine-prilocaine (EMLA) 2.5-2.5 % external cream Apply topically as needed for moderate pain Apply to PORT site 45 minutes prior to procedure.  Cover with a non-absorbent dressing. 30 g 4     LORazepam (ATIVAN) 0.5 MG tablet        LORazepam (ATIVAN) 0.5 MG tablet Take 1 tablet (0.5 mg) by mouth every 4 hours as needed (Anxiety, Nausea/Vomiting or Sleep) 30 tablet 3     magic mouthwash suspension, diphenhydrAMINE, lidocaine, aluminum-magnesium & simethicone, (FIRST-MOUTHWASH BLM) compounding kit Swish and swallow 5-10 mLs in  mouth every 6 hours as needed for mouth sores 237 mL 1     methylphenidate (RITALIN) 5 MG tablet Take 0.5-1 tablets (2.5-5 mg) by mouth 2 times daily as needed (fatigue) Take no later than 2pm 60 tablet 0     morphine (MSIR) 15 MG IR tablet Take 0.5-1 tablets (7.5-15 mg) by mouth every 4 hours as needed for severe pain 45 tablet 0     order for DME Equipment: Wig  Diagnosis: cranial alopecia 1 each 0     predniSONE (DELTASONE) 20 MG tablet Take 1 tablet (20 mg) by mouth daily 5 tablet 0     prochlorperazine (COMPAZINE) 10 MG tablet Take 1 tablet (10 mg) by mouth every 6 hours as needed (Nausea/Vomiting) 30 tablet 3     tamoxifen (NOLVADEX) 20 MG tablet Take 1 tablet (20 mg) by mouth daily 90 tablet 3     Vaginal Lubricant (REPLENS) GEL Apply vaginally as needed 35 g 1          Allergies   Allergen Reactions     Codeine Other (See Comments)     Got hyper         Video physical exam  General: Patient appears well in no acute distress.   Skin: No visualized rash or lesions on visualized skin. Scalp shows short hair.  Eyes: EOMI, no erythema, sclera icterus or discharge noted  Resp: Appears to be breathing comfortably without accessory muscle usage, speaking in full sentences, no cough  MSK: Appears to have normal range of motion based on visualized movements  Neurologic: No apparent tremors, facial movements symmetric  Psych: affect pleasant, engaged, alert and oriented    The rest of a comprehensive physical examination is deferred due to PHE (public health emergency) video restrictions     not currently breastfeeding.  Wt Readings from Last 4 Encounters:   12/18/20 48.6 kg (107 lb 1.6 oz)   12/15/20 47.6 kg (105 lb)   11/23/20 49.4 kg (109 lb)   10/30/20 49.7 kg (109 lb 9.6 oz)         Labs: n/a    Imaging: n/a    Impression/plan:   ER/MA +, HER-2 positive R upper/inner quadrant breast cancer. Clinically T2N0. Treated with neoadjuvant TCHP x 6 cycles and herceptin/perjeta alone with cycle 6. S/P bilateral mastecomies  and sentinel LN bx on 11/11/20 with a pathologic CR.  -continued with adjuvant herceptin/perjeta on 11/23/20, plan to continue every 3 weeks for a total of 1 year of therapy  -was also initiated on tamoxifen 20 mg daily  -tolerating well, better than when on neoadjuvant chemotherapy. Has anticipated hot flashes, some mood irritability, but managing that ok.   -due for cycle 3 on 1/8/21. Ok to proceed as planned and continue every 3 weeks.. Will have f/u with Dr. Gerardo on 2/11/21. Is anticipating reconstruction in April 2021    Cardiac monitoring while on HER2 therapy  -echo every 3 months, last done November 2020, due again February 2021    Diarrhea  -chronic, managing ok. Use imodium prn. Call if diarrhea is worsening, difficult to control.    Fatigue  -much better since stopping chemotherapy. OK'd to return to work from home.    Nausea s/t chemotherapy--resolved    Peripheral neuropathy s/t chemotherapy  -grade 1, not assess today. Monitor

## 2021-01-08 ENCOUNTER — INFUSION THERAPY VISIT (OUTPATIENT)
Dept: ONCOLOGY | Facility: CLINIC | Age: 41
End: 2021-01-08
Attending: INTERNAL MEDICINE
Payer: COMMERCIAL

## 2021-01-08 ENCOUNTER — APPOINTMENT (OUTPATIENT)
Dept: LAB | Facility: CLINIC | Age: 41
End: 2021-01-08
Attending: INTERNAL MEDICINE
Payer: COMMERCIAL

## 2021-01-08 VITALS
HEART RATE: 83 BPM | BODY MASS INDEX: 18.64 KG/M2 | WEIGHT: 108.6 LBS | RESPIRATION RATE: 16 BRPM | OXYGEN SATURATION: 99 % | SYSTOLIC BLOOD PRESSURE: 133 MMHG | DIASTOLIC BLOOD PRESSURE: 84 MMHG | TEMPERATURE: 98.6 F

## 2021-01-08 DIAGNOSIS — C50.211 MALIGNANT NEOPLASM OF UPPER-INNER QUADRANT OF RIGHT BREAST IN FEMALE, ESTROGEN RECEPTOR POSITIVE (H): Primary | ICD-10-CM

## 2021-01-08 DIAGNOSIS — Z17.0 MALIGNANT NEOPLASM OF UPPER-INNER QUADRANT OF RIGHT BREAST IN FEMALE, ESTROGEN RECEPTOR POSITIVE (H): Primary | ICD-10-CM

## 2021-01-08 LAB
ALBUMIN SERPL-MCNC: 3.6 G/DL (ref 3.4–5)
ALP SERPL-CCNC: 78 U/L (ref 40–150)
ALT SERPL W P-5'-P-CCNC: 14 U/L (ref 0–50)
ANION GAP SERPL CALCULATED.3IONS-SCNC: 2 MMOL/L (ref 3–14)
AST SERPL W P-5'-P-CCNC: 11 U/L (ref 0–45)
BASOPHILS # BLD AUTO: 0 10E9/L (ref 0–0.2)
BASOPHILS NFR BLD AUTO: 0.7 %
BILIRUB SERPL-MCNC: 0.2 MG/DL (ref 0.2–1.3)
BUN SERPL-MCNC: 4 MG/DL (ref 7–30)
CALCIUM SERPL-MCNC: 9 MG/DL (ref 8.5–10.1)
CHLORIDE SERPL-SCNC: 104 MMOL/L (ref 94–109)
CO2 SERPL-SCNC: 29 MMOL/L (ref 20–32)
CREAT SERPL-MCNC: 0.61 MG/DL (ref 0.52–1.04)
DIFFERENTIAL METHOD BLD: ABNORMAL
EOSINOPHIL # BLD AUTO: 0.2 10E9/L (ref 0–0.7)
EOSINOPHIL NFR BLD AUTO: 3.9 %
ERYTHROCYTE [DISTWIDTH] IN BLOOD BY AUTOMATED COUNT: 12.6 % (ref 10–15)
GFR SERPL CREATININE-BSD FRML MDRD: >90 ML/MIN/{1.73_M2}
GLUCOSE SERPL-MCNC: 101 MG/DL (ref 70–99)
HCT VFR BLD AUTO: 36.3 % (ref 35–47)
HGB BLD-MCNC: 12.5 G/DL (ref 11.7–15.7)
IMM GRANULOCYTES # BLD: 0 10E9/L (ref 0–0.4)
IMM GRANULOCYTES NFR BLD: 0.4 %
LYMPHOCYTES # BLD AUTO: 1.7 10E9/L (ref 0.8–5.3)
LYMPHOCYTES NFR BLD AUTO: 31.1 %
MCH RBC QN AUTO: 39.6 PG (ref 26.5–33)
MCHC RBC AUTO-ENTMCNC: 34.4 G/DL (ref 31.5–36.5)
MCV RBC AUTO: 115 FL (ref 78–100)
MONOCYTES # BLD AUTO: 0.5 10E9/L (ref 0–1.3)
MONOCYTES NFR BLD AUTO: 9.6 %
NEUTROPHILS # BLD AUTO: 2.9 10E9/L (ref 1.6–8.3)
NEUTROPHILS NFR BLD AUTO: 54.3 %
NRBC # BLD AUTO: 0 10*3/UL
NRBC BLD AUTO-RTO: 0 /100
PLATELET # BLD AUTO: 196 10E9/L (ref 150–450)
POTASSIUM SERPL-SCNC: 4.1 MMOL/L (ref 3.4–5.3)
PROT SERPL-MCNC: 7 G/DL (ref 6.8–8.8)
RBC # BLD AUTO: 3.16 10E12/L (ref 3.8–5.2)
SODIUM SERPL-SCNC: 136 MMOL/L (ref 133–144)
WBC # BLD AUTO: 5.4 10E9/L (ref 4–11)

## 2021-01-08 PROCEDURE — G0463 HOSPITAL OUTPT CLINIC VISIT: HCPCS | Mod: 25

## 2021-01-08 PROCEDURE — 85025 COMPLETE CBC W/AUTO DIFF WBC: CPT | Performed by: INTERNAL MEDICINE

## 2021-01-08 PROCEDURE — 250N000011 HC RX IP 250 OP 636: Performed by: NURSE PRACTITIONER

## 2021-01-08 PROCEDURE — 250N000011 HC RX IP 250 OP 636: Performed by: INTERNAL MEDICINE

## 2021-01-08 PROCEDURE — 258N000003 HC RX IP 258 OP 636: Performed by: NURSE PRACTITIONER

## 2021-01-08 PROCEDURE — 96413 CHEMO IV INFUSION 1 HR: CPT

## 2021-01-08 PROCEDURE — 96417 CHEMO IV INFUS EACH ADDL SEQ: CPT

## 2021-01-08 PROCEDURE — 80053 COMPREHEN METABOLIC PANEL: CPT | Performed by: INTERNAL MEDICINE

## 2021-01-08 RX ORDER — HEPARIN SODIUM (PORCINE) LOCK FLUSH IV SOLN 100 UNIT/ML 100 UNIT/ML
5 SOLUTION INTRAVENOUS
Status: DISCONTINUED | OUTPATIENT
Start: 2021-01-08 | End: 2021-01-08 | Stop reason: HOSPADM

## 2021-01-08 RX ORDER — HEPARIN SODIUM (PORCINE) LOCK FLUSH IV SOLN 100 UNIT/ML 100 UNIT/ML
5 SOLUTION INTRAVENOUS
Status: COMPLETED | OUTPATIENT
Start: 2021-01-08 | End: 2021-01-08

## 2021-01-08 RX ADMIN — Medication 5 ML: at 11:06

## 2021-01-08 RX ADMIN — Medication 5 ML: at 08:59

## 2021-01-08 RX ADMIN — SODIUM CHLORIDE 250 ML: 9 INJECTION, SOLUTION INTRAVENOUS at 09:55

## 2021-01-08 RX ADMIN — PERTUZUMAB 420 MG: 30 INJECTION, SOLUTION, CONCENTRATE INTRAVENOUS at 09:55

## 2021-01-08 RX ADMIN — TRASTUZUMAB-ANNS 290 MG: 420 INJECTION, POWDER, LYOPHILIZED, FOR SOLUTION INTRAVENOUS at 10:34

## 2021-01-08 ASSESSMENT — PAIN SCALES - GENERAL: PAINLEVEL: NO PAIN (0)

## 2021-01-08 NOTE — PROGRESS NOTES
Infusion Nursing Note:  Leah Rangel presents today for C3D1 perjeta/kanjiniti infusion.    Patient seen by provider today: No   present during visit today: Not Applicable.    Note: patient assessed upon arrival to infusion clinic.  Patient states she has been feeling well. Denies fever/chills, SOB, or cough.  Continues with baseline diarrhea. No other concerns except patient stated that after she and her  had sexual intercourse for the first time in 6 months a few days ago she experienced pain,burning, and some spotting.  She noted that it was swollen but no tears or discharge noted. She denies any pelvic pain.  She has been using vagisil and cooling cloths/compresses.  She has been using her hot tub which we suggested not to use at this time until the symptoms resolve.  We encouraged to continue using the cool compresses and to call if it is not getting any better or her symptoms worsen.  We will talked to her about using lubricant for future. Message was sent to Dr. Gerardo for follow up.    Intravenous Access:   Implanted Port.    Treatment Conditions:  Lab Results   Component Value Date    HGB 12.5 01/08/2021     Lab Results   Component Value Date    WBC 5.4 01/08/2021      Lab Results   Component Value Date    ANEU 2.9 01/08/2021     Lab Results   Component Value Date     01/08/2021      Lab Results   Component Value Date     01/08/2021                   Lab Results   Component Value Date    POTASSIUM 4.1 01/08/2021           No results found for: MAG         Lab Results   Component Value Date    CR 0.61 01/08/2021                   Lab Results   Component Value Date    URVASHI 9.0 01/08/2021                Lab Results   Component Value Date    BILITOTAL 0.2 01/08/2021           Lab Results   Component Value Date    ALBUMIN 3.6 01/08/2021                    Lab Results   Component Value Date    ALT 14 01/08/2021           Lab Results   Component Value Date    AST 11 01/08/2021        Results reviewed, labs MET treatment parameters, ok to proceed with treatment.  ECHO/MUGA completed 11/6/20      Post Infusion Assessment:  Patient tolerated infusion without incident.  Blood return noted pre and post infusion.  Site patent and intact, free from redness, edema or discomfort.  No evidence of extravasations.  Access discontinued per protocol.       Discharge Plan:   Prescription refills given for Ativan  .  Discharge instructions reviewed with: Patient.  Patient and/or family verbalized understanding of discharge instructions and all questions answered.  AVS to patient via [employer].  Patient will return 1/29/21 for next appointment.   Patient discharged in stable condition accompanied by: self.  Departure Mode: Ambulatory.    Negin Cho RN

## 2021-01-08 NOTE — NURSING NOTE
"Chief Complaint   Patient presents with     Port Draw     labs drawn from port by rn.  vs taken     Port accessed with 20 gauge 3/4\" gripper needle and labs drawn by rn.  Port flushed with NS and heparin.  Pt tolerated well.  VS taken.  Pt checked in for next appt.    Antonia Zamarripa RN      "

## 2021-01-09 ENCOUNTER — NURSE TRIAGE (OUTPATIENT)
Dept: NURSING | Facility: CLINIC | Age: 41
End: 2021-01-09

## 2021-01-09 NOTE — TELEPHONE ENCOUNTER
Leah is calling and states that yesterday she had chemo and had intercourse and now is having swelling and pain.  Tried anti itch cream and did not work.  Went to chemo yesterday and was in pain.  Tried ice pack.  Leah is having pain when touching and hurts to wipe.  Leah has diarrhea all the time also.  Leah is requesting to speak with Camila.  Please phone Leah on Monday Jan 11th.  Leah is going to try cold compresses and try sitz bath/shower with cooler water.  Leah is unsure if she should be seen by gyn or oncology.  FNA advised to contact Oncology.  Denies discharge or urinary symptoms, painful urinaton.  Fredrick feels labia/outside is swollen due to intercourse.  Leah did not want to go anywhere to be seen.  FNA offered to page on call and patient declines.     COVID 19 Nurse Triage Plan/Patient Instructions    Please be aware that novel coronavirus (COVID-19) may be circulating in the community. If you develop symptoms such as fever, cough, or SOB or if you have concerns about the presence of another infection including coronavirus (COVID-19), please contact your health care provider or visit [email or web address].     Disposition/Instructions    Home care recommended. Follow home care protocol based instructions.    Thank you for taking steps to prevent the spread of this virus.  o Limit your contact with others.  o Wear a simple mask to cover your cough.  o Wash your hands well and often.    Resources    [facility]: About COVID-19: [email or web address]    CDC: What to Do If You're Sick: www.cdc.gov/coronavirus/2019-ncov/about/steps-when-sick.html    CDC: Ending Home Isolation: www.cdc.gov/coronavirus/2019-ncov/hcp/disposition-in-home-patients.html     CDC: Caring for Someone: www.cdc.gov/coronavirus/2019-ncov/if-you-are-sick/care-for-someone.html     [facility]: Interim Guidance for Hospital Discharge to Home:  "www.health.[facility].[place]/diseases/coronavirus/hcp/hospdischarge.pdf    [facility] clinical trials (COVID-19 research studies): [email or web address].[facility]/umn-clinical-trials     Below are the COVID-19 hotlines at the [place] Department of Health ([facility]). Interpreters are available.   o For health questions: Call 984-136-8312 or 1-669.753.8869 (7 a.m. to 7 p.m.)  o For questions about schools and childcare: Call 607-586-3274 or 1-187.873.5939 (7 a.m. to 7 p.m.)                        Reason for Disposition    Pain with sexual intercourse (dyspareunia)  (Exception: feels like prior yeast infection, minor abrasion, minor rash < 24 hour duration, mild itching)    Additional Information    Negative: Patient sounds very sick or weak to the triager    Negative: [1] SEVERE pain AND [2] not improved 2 hours after pain medicine    Negative: [1] Genital area looks infected (e.g., draining sore, spreading redness) AND [2] fever    Negative: [1] Something is hanging out of the vagina AND [2] can't easily be pushed back inside    Negative: MODERATE-SEVERE itching (i.e., interferes with school, work, or sleep)    Negative: Genital area looks infected (e.g., draining sore, spreading redness)    Negative: Rash with painful tiny water blisters    Negative: [1] Rash (e.g., redness, tiny bumps, sore) of genital area AND [2] present > 24 hours    Negative: Tender lump (swelling or \"ball\") at vaginal opening    Negative: [1] Symptoms of a yeast infection (i.e., itchy, white discharge, not bad smelling) AND    [2] not improved > 3 days following CARE ADVICE    Negative: [1] Vaginal itching AND [2] not improved > 3 days following CARE ADVICE    Negative: Patient is worried about sexually transmitted disease (STD)    Negative: Feels like something inside is falling out of vagina (e.g., pressure, heaviness, fullness)    Negative: [1] Vaginal dryness or itching AND [2] nearing menopause or after menopause    Protocols used: VAGINAL " SYMPTOMS-A-AH

## 2021-01-11 NOTE — TELEPHONE ENCOUNTER
Spoke with patient she states about one week ago she had intercourse and then developed vaginal soreness and bleeding (spotting). She states the soreness progressively became worse and turned into redness/swelling. She states it hurts to wipe after using the restroom. She notes similar symptoms near her rectum. She denies white patches, vaginal discharge, fever, chills, urinary symptoms.    She is seeing her Gynecologist today at 1100 ([facility]) for evaluation of symptoms.

## 2021-01-19 ENCOUNTER — PATIENT OUTREACH (OUTPATIENT)
Dept: ONCOLOGY | Facility: CLINIC | Age: 41
End: 2021-01-19

## 2021-01-19 NOTE — PROGRESS NOTES
RN CARE COORDINATION NOTE      Outgoing: Called to check in patient since her reports of vaginal symptoms.   She is feeling much better. She was seen by gyn and had a yeast infection. It has cleared up and she is feeling much better.       Rosita Ordonez MSN, RN, OCN  RN Care Coordinator  [facility]  [phone]

## 2021-01-26 DIAGNOSIS — Z17.0 MALIGNANT NEOPLASM OF UPPER-INNER QUADRANT OF RIGHT BREAST IN FEMALE, ESTROGEN RECEPTOR POSITIVE (H): Primary | ICD-10-CM

## 2021-01-26 DIAGNOSIS — C50.211 MALIGNANT NEOPLASM OF UPPER-INNER QUADRANT OF RIGHT BREAST IN FEMALE, ESTROGEN RECEPTOR POSITIVE (H): Primary | ICD-10-CM

## 2021-01-26 RX ORDER — NALOXONE HYDROCHLORIDE 0.4 MG/ML
.1-.4 INJECTION, SOLUTION INTRAMUSCULAR; INTRAVENOUS; SUBCUTANEOUS
Status: CANCELLED | OUTPATIENT
Start: 2021-02-19

## 2021-01-26 RX ORDER — EPINEPHRINE 1 MG/ML
0.3 INJECTION, SOLUTION INTRAMUSCULAR; SUBCUTANEOUS EVERY 5 MIN PRN
Status: CANCELLED | OUTPATIENT
Start: 2021-04-02

## 2021-01-26 RX ORDER — HEPARIN SODIUM,PORCINE 10 UNIT/ML
5 VIAL (ML) INTRAVENOUS
Status: CANCELLED | OUTPATIENT
Start: 2021-04-02

## 2021-01-26 RX ORDER — ACETAMINOPHEN 325 MG/1
650 TABLET ORAL
Status: CANCELLED
Start: 2021-02-19

## 2021-01-26 RX ORDER — DIPHENHYDRAMINE HYDROCHLORIDE 50 MG/ML
50 INJECTION INTRAMUSCULAR; INTRAVENOUS
Status: CANCELLED
Start: 2021-04-02

## 2021-01-26 RX ORDER — ACETAMINOPHEN 325 MG/1
650 TABLET ORAL
Status: CANCELLED
Start: 2021-01-29

## 2021-01-26 RX ORDER — METHYLPREDNISOLONE SODIUM SUCCINATE 125 MG/2ML
125 INJECTION, POWDER, LYOPHILIZED, FOR SOLUTION INTRAMUSCULAR; INTRAVENOUS
Status: CANCELLED
Start: 2021-01-29

## 2021-01-26 RX ORDER — SODIUM CHLORIDE 9 MG/ML
1000 INJECTION, SOLUTION INTRAVENOUS CONTINUOUS PRN
Status: CANCELLED
Start: 2021-01-29

## 2021-01-26 RX ORDER — DIPHENHYDRAMINE HCL 25 MG
50 CAPSULE ORAL
Status: CANCELLED
Start: 2021-03-12

## 2021-01-26 RX ORDER — LORAZEPAM 2 MG/ML
0.5 INJECTION INTRAMUSCULAR EVERY 4 HOURS PRN
Status: CANCELLED
Start: 2021-03-12

## 2021-01-26 RX ORDER — DIPHENHYDRAMINE HYDROCHLORIDE 50 MG/ML
50 INJECTION INTRAMUSCULAR; INTRAVENOUS
Status: CANCELLED
Start: 2021-02-19

## 2021-01-26 RX ORDER — ALBUTEROL SULFATE 0.83 MG/ML
2.5 SOLUTION RESPIRATORY (INHALATION)
Status: CANCELLED | OUTPATIENT
Start: 2021-02-19

## 2021-01-26 RX ORDER — HEPARIN SODIUM,PORCINE 10 UNIT/ML
5 VIAL (ML) INTRAVENOUS
Status: CANCELLED | OUTPATIENT
Start: 2021-02-19

## 2021-01-26 RX ORDER — SODIUM CHLORIDE 9 MG/ML
1000 INJECTION, SOLUTION INTRAVENOUS CONTINUOUS PRN
Status: CANCELLED
Start: 2021-04-02

## 2021-01-26 RX ORDER — ALBUTEROL SULFATE 90 UG/1
1-2 AEROSOL, METERED RESPIRATORY (INHALATION)
Status: CANCELLED
Start: 2021-04-02

## 2021-01-26 RX ORDER — LORAZEPAM 2 MG/ML
0.5 INJECTION INTRAMUSCULAR EVERY 4 HOURS PRN
Status: CANCELLED
Start: 2021-02-19

## 2021-01-26 RX ORDER — DIPHENHYDRAMINE HYDROCHLORIDE 50 MG/ML
50 INJECTION INTRAMUSCULAR; INTRAVENOUS
Status: CANCELLED
Start: 2021-01-29

## 2021-01-26 RX ORDER — ACETAMINOPHEN 325 MG/1
650 TABLET ORAL
Status: CANCELLED
Start: 2021-03-12

## 2021-01-26 RX ORDER — DIPHENHYDRAMINE HCL 25 MG
50 CAPSULE ORAL
Status: CANCELLED
Start: 2021-04-02

## 2021-01-26 RX ORDER — EPINEPHRINE 1 MG/ML
0.3 INJECTION, SOLUTION INTRAMUSCULAR; SUBCUTANEOUS EVERY 5 MIN PRN
Status: CANCELLED | OUTPATIENT
Start: 2021-01-29

## 2021-01-26 RX ORDER — HEPARIN SODIUM (PORCINE) LOCK FLUSH IV SOLN 100 UNIT/ML 100 UNIT/ML
5 SOLUTION INTRAVENOUS
Status: CANCELLED | OUTPATIENT
Start: 2021-01-29

## 2021-01-26 RX ORDER — MEPERIDINE HYDROCHLORIDE 25 MG/ML
25 INJECTION INTRAMUSCULAR; INTRAVENOUS; SUBCUTANEOUS EVERY 30 MIN PRN
Status: CANCELLED | OUTPATIENT
Start: 2021-04-02

## 2021-01-26 RX ORDER — ALBUTEROL SULFATE 0.83 MG/ML
2.5 SOLUTION RESPIRATORY (INHALATION)
Status: CANCELLED | OUTPATIENT
Start: 2021-01-29

## 2021-01-26 RX ORDER — SODIUM CHLORIDE 9 MG/ML
1000 INJECTION, SOLUTION INTRAVENOUS CONTINUOUS PRN
Status: CANCELLED
Start: 2021-02-19

## 2021-01-26 RX ORDER — MEPERIDINE HYDROCHLORIDE 25 MG/ML
25 INJECTION INTRAMUSCULAR; INTRAVENOUS; SUBCUTANEOUS EVERY 30 MIN PRN
Status: CANCELLED | OUTPATIENT
Start: 2021-02-19

## 2021-01-26 RX ORDER — ALBUTEROL SULFATE 90 UG/1
1-2 AEROSOL, METERED RESPIRATORY (INHALATION)
Status: CANCELLED
Start: 2021-02-19

## 2021-01-26 RX ORDER — ACETAMINOPHEN 325 MG/1
650 TABLET ORAL
Status: CANCELLED
Start: 2021-04-02

## 2021-01-26 RX ORDER — EPINEPHRINE 1 MG/ML
0.3 INJECTION, SOLUTION INTRAMUSCULAR; SUBCUTANEOUS EVERY 5 MIN PRN
Status: CANCELLED | OUTPATIENT
Start: 2021-03-12

## 2021-01-26 RX ORDER — SODIUM CHLORIDE 9 MG/ML
1000 INJECTION, SOLUTION INTRAVENOUS CONTINUOUS PRN
Status: CANCELLED
Start: 2021-03-12

## 2021-01-26 RX ORDER — HEPARIN SODIUM (PORCINE) LOCK FLUSH IV SOLN 100 UNIT/ML 100 UNIT/ML
5 SOLUTION INTRAVENOUS
Status: CANCELLED | OUTPATIENT
Start: 2021-04-02

## 2021-01-26 RX ORDER — MEPERIDINE HYDROCHLORIDE 25 MG/ML
25 INJECTION INTRAMUSCULAR; INTRAVENOUS; SUBCUTANEOUS EVERY 30 MIN PRN
Status: CANCELLED | OUTPATIENT
Start: 2021-03-12

## 2021-01-26 RX ORDER — MEPERIDINE HYDROCHLORIDE 25 MG/ML
25 INJECTION INTRAMUSCULAR; INTRAVENOUS; SUBCUTANEOUS EVERY 30 MIN PRN
Status: CANCELLED | OUTPATIENT
Start: 2021-01-29

## 2021-01-26 RX ORDER — METHYLPREDNISOLONE SODIUM SUCCINATE 125 MG/2ML
125 INJECTION, POWDER, LYOPHILIZED, FOR SOLUTION INTRAMUSCULAR; INTRAVENOUS
Status: CANCELLED
Start: 2021-03-12

## 2021-01-26 RX ORDER — HEPARIN SODIUM,PORCINE 10 UNIT/ML
5 VIAL (ML) INTRAVENOUS
Status: CANCELLED | OUTPATIENT
Start: 2021-03-12

## 2021-01-26 RX ORDER — NALOXONE HYDROCHLORIDE 0.4 MG/ML
.1-.4 INJECTION, SOLUTION INTRAMUSCULAR; INTRAVENOUS; SUBCUTANEOUS
Status: CANCELLED | OUTPATIENT
Start: 2021-01-29

## 2021-01-26 RX ORDER — LORAZEPAM 2 MG/ML
0.5 INJECTION INTRAMUSCULAR EVERY 4 HOURS PRN
Status: CANCELLED
Start: 2021-01-29

## 2021-01-26 RX ORDER — ALBUTEROL SULFATE 0.83 MG/ML
2.5 SOLUTION RESPIRATORY (INHALATION)
Status: CANCELLED | OUTPATIENT
Start: 2021-03-12

## 2021-01-26 RX ORDER — DIPHENHYDRAMINE HCL 25 MG
50 CAPSULE ORAL
Status: CANCELLED
Start: 2021-02-19

## 2021-01-26 RX ORDER — NALOXONE HYDROCHLORIDE 0.4 MG/ML
.1-.4 INJECTION, SOLUTION INTRAMUSCULAR; INTRAVENOUS; SUBCUTANEOUS
Status: CANCELLED | OUTPATIENT
Start: 2021-04-02

## 2021-01-26 RX ORDER — HEPARIN SODIUM (PORCINE) LOCK FLUSH IV SOLN 100 UNIT/ML 100 UNIT/ML
5 SOLUTION INTRAVENOUS
Status: CANCELLED | OUTPATIENT
Start: 2021-03-12

## 2021-01-26 RX ORDER — ALBUTEROL SULFATE 90 UG/1
1-2 AEROSOL, METERED RESPIRATORY (INHALATION)
Status: CANCELLED
Start: 2021-03-12

## 2021-01-26 RX ORDER — DIPHENHYDRAMINE HCL 25 MG
50 CAPSULE ORAL
Status: CANCELLED
Start: 2021-01-29

## 2021-01-26 RX ORDER — NALOXONE HYDROCHLORIDE 0.4 MG/ML
.1-.4 INJECTION, SOLUTION INTRAMUSCULAR; INTRAVENOUS; SUBCUTANEOUS
Status: CANCELLED | OUTPATIENT
Start: 2021-03-12

## 2021-01-26 RX ORDER — METHYLPREDNISOLONE SODIUM SUCCINATE 125 MG/2ML
125 INJECTION, POWDER, LYOPHILIZED, FOR SOLUTION INTRAMUSCULAR; INTRAVENOUS
Status: CANCELLED
Start: 2021-02-19

## 2021-01-26 RX ORDER — ALBUTEROL SULFATE 90 UG/1
1-2 AEROSOL, METERED RESPIRATORY (INHALATION)
Status: CANCELLED
Start: 2021-01-29

## 2021-01-26 RX ORDER — EPINEPHRINE 1 MG/ML
0.3 INJECTION, SOLUTION INTRAMUSCULAR; SUBCUTANEOUS EVERY 5 MIN PRN
Status: CANCELLED | OUTPATIENT
Start: 2021-02-19

## 2021-01-26 RX ORDER — ALBUTEROL SULFATE 0.83 MG/ML
2.5 SOLUTION RESPIRATORY (INHALATION)
Status: CANCELLED | OUTPATIENT
Start: 2021-04-02

## 2021-01-26 RX ORDER — LORAZEPAM 2 MG/ML
0.5 INJECTION INTRAMUSCULAR EVERY 4 HOURS PRN
Status: CANCELLED
Start: 2021-04-02

## 2021-01-26 RX ORDER — DIPHENHYDRAMINE HYDROCHLORIDE 50 MG/ML
50 INJECTION INTRAMUSCULAR; INTRAVENOUS
Status: CANCELLED
Start: 2021-03-12

## 2021-01-26 RX ORDER — METHYLPREDNISOLONE SODIUM SUCCINATE 125 MG/2ML
125 INJECTION, POWDER, LYOPHILIZED, FOR SOLUTION INTRAMUSCULAR; INTRAVENOUS
Status: CANCELLED
Start: 2021-04-02

## 2021-01-26 RX ORDER — HEPARIN SODIUM,PORCINE 10 UNIT/ML
5 VIAL (ML) INTRAVENOUS
Status: CANCELLED | OUTPATIENT
Start: 2021-01-29

## 2021-01-26 RX ORDER — HEPARIN SODIUM (PORCINE) LOCK FLUSH IV SOLN 100 UNIT/ML 100 UNIT/ML
5 SOLUTION INTRAVENOUS
Status: CANCELLED | OUTPATIENT
Start: 2021-02-19

## 2021-01-29 ENCOUNTER — INFUSION THERAPY VISIT (OUTPATIENT)
Dept: ONCOLOGY | Facility: CLINIC | Age: 41
End: 2021-01-29
Attending: INTERNAL MEDICINE
Payer: COMMERCIAL

## 2021-01-29 ENCOUNTER — APPOINTMENT (OUTPATIENT)
Dept: LAB | Facility: CLINIC | Age: 41
End: 2021-01-29
Attending: INTERNAL MEDICINE
Payer: COMMERCIAL

## 2021-01-29 VITALS
SYSTOLIC BLOOD PRESSURE: 129 MMHG | RESPIRATION RATE: 16 BRPM | DIASTOLIC BLOOD PRESSURE: 76 MMHG | HEART RATE: 85 BPM | BODY MASS INDEX: 18.71 KG/M2 | TEMPERATURE: 98 F | OXYGEN SATURATION: 97 % | WEIGHT: 109 LBS

## 2021-01-29 DIAGNOSIS — Z17.0 MALIGNANT NEOPLASM OF UPPER-INNER QUADRANT OF RIGHT BREAST IN FEMALE, ESTROGEN RECEPTOR POSITIVE (H): Primary | ICD-10-CM

## 2021-01-29 DIAGNOSIS — C50.211 MALIGNANT NEOPLASM OF UPPER-INNER QUADRANT OF RIGHT BREAST IN FEMALE, ESTROGEN RECEPTOR POSITIVE (H): Primary | ICD-10-CM

## 2021-01-29 PROCEDURE — 96413 CHEMO IV INFUSION 1 HR: CPT

## 2021-01-29 PROCEDURE — 96417 CHEMO IV INFUS EACH ADDL SEQ: CPT

## 2021-01-29 PROCEDURE — 258N000003 HC RX IP 258 OP 636: Performed by: INTERNAL MEDICINE

## 2021-01-29 PROCEDURE — 250N000011 HC RX IP 250 OP 636: Performed by: INTERNAL MEDICINE

## 2021-01-29 RX ORDER — HEPARIN SODIUM (PORCINE) LOCK FLUSH IV SOLN 100 UNIT/ML 100 UNIT/ML
5 SOLUTION INTRAVENOUS
Status: DISCONTINUED | OUTPATIENT
Start: 2021-01-29 | End: 2021-01-29 | Stop reason: HOSPADM

## 2021-01-29 RX ADMIN — Medication 5 ML: at 11:44

## 2021-01-29 RX ADMIN — TRASTUZUMAB-ANNS 290 MG: 420 INJECTION, POWDER, LYOPHILIZED, FOR SOLUTION INTRAVENOUS at 11:11

## 2021-01-29 RX ADMIN — PERTUZUMAB 420 MG: 30 INJECTION, SOLUTION, CONCENTRATE INTRAVENOUS at 10:33

## 2021-01-29 RX ADMIN — SODIUM CHLORIDE 250 ML: 9 INJECTION, SOLUTION INTRAVENOUS at 10:32

## 2021-01-29 NOTE — PROGRESS NOTES
Infusion Nursing Note:  Leah Rangel presents today for Cycle 4 Day 1 Roro and Cristysony.    Patient seen by provider today: No   present during visit today: Not Applicable.    Note: Patient arrives to infusion today doing well. Denies any fever/chills, shortness of breath, chest pain, nausea or cough. Continues experiencing diarrhea, although states that this is baseline and takes imodium prn. No new concerns today per patient.     Patient did meet criteria for an asymptomatic covid-19 PCR test in infusion today. Patient declined the covid-19 test.    TORB @ 1149 KEN Ruiz/ Zoey Montaño RN:  - OK to not have any labs drawn today    Intravenous Access:  Implanted Port.    Treatment Conditions:  Not Applicable.  ECHO/MUGA completed 11/6/20.    Post Infusion Assessment:  Patient tolerated infusion without incident.  Blood return noted pre and post infusion.  Site patent and intact, free from redness, edema or discomfort.  No evidence of extravasations.  Access discontinued per protocol.     Discharge Plan:   Prescription refills given for Ativan.  Discharge instructions reviewed with: Patient.  Patient and/or family verbalized understanding of discharge instructions and all questions answered.  AVS to patient via [employer].  Patient will return 2/11 for next appointment with Dr. Gerardo and 2/19 for next infusion appointment.   Patient discharged in stable condition accompanied by: self.  Departure Mode: Ambulatory.    Zoey Montaño, SHANDA

## 2021-01-29 NOTE — PATIENT INSTRUCTIONS
Contact Numbers  [facility]: [phone] (for symptom and scheduling needs)    Please call the [facility] Triage line if you experience a temperature greater than or equal to 100.4, shaking chills, have uncontrolled nausea, vomiting and/or diarrhea, dizziness, shortness of breath, chest pain, bleeding, unexplained bruising, or if you have any other new/concerning symptoms, questions or concerns.     If you are having any concerning symptoms or wish to speak to a provider before your next infusion visit, please call your care coordinator or triage to notify them so we can adequately serve you.     If you need a refill on a narcotic prescription or other medication, please call triage before your infusion appointment.          January 2021 Sunday Monday Tuesday Wednesday Thursday Friday Saturday                            1     2       3     4     5  Happy Birthday!     6     7     8    LAB CENTRAL   8:15 AM   (15 min.)   [facility] LAB DRAW   [facility] ONC INFUSION 360   9:00 AM   (360 min.)    ONCOLOGY INFUSION   [facility] 9       10     11     12     13     14     15     16       17     18     19     20     21     22     23       24     25     26     27     28     29    LAB CENTRAL   8:15 AM   (15 min.)   [facility] LAB DRAW   [facility] ONC INFUSION 360   9:00 AM   (360 min.)    ONCOLOGY INFUSION   [facility] 30 31 February 2021 Sunday Monday Tuesday Wednesday Thursday Friday Saturday        1     2     3     4     5     6       7     8     9     10     11    VIDEO VISIT RETURN   8:15 AM   (30 min.)   Lucita Gerardo MD   [facility] 12     13       14     15     16     17     18     19    LAB CENTRAL   7:30 AM   (15 min.)   [facility] LAB DRAW   [facility]  Clinic    ECHO LIMITED   8:00 AM   (60 min.)   [id number]   [facility] ONC INFUSION 360   9:00 AM   (360 min.)    ONCOLOGY INFUSION   [facility] 20       21     22     23     24     25     26     27       28                                                   Lab Results:  No results found for this or any previous visit (from the past 12 hour(s)).

## 2021-02-01 PROBLEM — Z17.0 MALIGNANT NEOPLASM OF UPPER-INNER QUADRANT OF RIGHT BREAST IN FEMALE, ESTROGEN RECEPTOR POSITIVE (H): Status: ACTIVE | Noted: 2020-07-16

## 2021-02-01 PROBLEM — C50.211 MALIGNANT NEOPLASM OF UPPER-INNER QUADRANT OF RIGHT BREAST IN FEMALE, ESTROGEN RECEPTOR POSITIVE (H): Status: ACTIVE | Noted: 2020-07-16

## 2021-02-11 ENCOUNTER — VIRTUAL VISIT (OUTPATIENT)
Dept: ONCOLOGY | Facility: CLINIC | Age: 41
End: 2021-02-11
Attending: INTERNAL MEDICINE
Payer: COMMERCIAL

## 2021-02-11 DIAGNOSIS — C50.211 MALIGNANT NEOPLASM OF UPPER-INNER QUADRANT OF RIGHT BREAST IN FEMALE, ESTROGEN RECEPTOR POSITIVE (H): Primary | ICD-10-CM

## 2021-02-11 DIAGNOSIS — Z51.11 ENCOUNTER FOR ANTINEOPLASTIC CHEMOTHERAPY: ICD-10-CM

## 2021-02-11 DIAGNOSIS — Z17.0 MALIGNANT NEOPLASM OF UPPER-INNER QUADRANT OF RIGHT BREAST IN FEMALE, ESTROGEN RECEPTOR POSITIVE (H): Primary | ICD-10-CM

## 2021-02-11 PROCEDURE — 999N001193 HC VIDEO/TELEPHONE VISIT; NO CHARGE

## 2021-02-11 PROCEDURE — 99214 OFFICE O/P EST MOD 30 MIN: CPT | Mod: 95 | Performed by: INTERNAL MEDICINE

## 2021-02-11 NOTE — PATIENT INSTRUCTIONS
"Leah,    It was nice to \"see\" you today.  I'm glad you are doing better.    Here are some  resources you may find helpful in your recovery:    - Thrive series    - Taking Charge of your Survivorship ([facility])    - Annual Cancer Survivorship Conference    The info regarding the conference and the Thrive series is available at survivorship.[email or web address].      We will continue your herceptin and perjeta through August.      We will postpone your 4/2 infusion until 4/16.    Let me know if you have other questions.    Lucita Gerardo    "

## 2021-02-11 NOTE — PROGRESS NOTES
"Leah is a 41 year old who is being evaluated via a billable video visit.      How would you like to obtain your AVS? MyChart  If the video visit is dropped, the invitation should be resent by: Text to cell phone: [phone]  Will anyone else be joining your video visit? No      Vitals - Patient Reported  Weight (Patient Reported): 47.6 kg (105 lb)  Height (Patient Reported): 162.6 cm (5' 4\")  BMI (Based on Pt Reported Ht/Wt): 18.02  Pain Score: No Pain (0)      I have reviewed and updated patient's allergy and medication list.    Concerns: NONE  Refills: NONE      Stephanie Cook [facility]    Video Start Time: 840  Video-Visit Details    Type of service:  Video Visit    Video End Time:910    Originating Location (pt. Location): Home    Distant Location (provider location):  [facility]     Platform used for Video Visit: [employer]        Reason for Visit: follow-up invasive ductal carcinoma of the R breast, grade 3, ER positive, LA positive, HER-2 positive 3+ by IHC    Oncology HPI:   Leah Rangel is a  40 year old, premenopausal female, diagnosed with R breast cancer, T2Nx right breast cancer.      Leah reports that she has a history of bilateral implants.  She noticed a palpable mass involving her right breast in the upper inner quadrant in early 06/2020.  At that time, she went in for an evaluation with a mammogram and ultrasound.  Mammogram on 06/11/2020 revealed a 7 mm mass at the 1 o'clock position approximately 5 cm from the nipple in the upper inner quadrant of the right breast.  There was a second area measuring 6 x 5 x 4 mm at the 1 o'clock position 6 cm from the nipple.  In the left breast, there was an area of microcalcifications.  She was referred for a right breast biopsy.  Both areas at the 1 o'clock position were biopsied.  No lymph nodes were appreciated.  Both of these biopsies revealed an invasive ductal carcinoma, grade 3, ER positive, LA positive, HER-2 positive, 3+ " "by IHC.  She  was considered to have a T1b multifocal N1 breast cancer.  On further review, it appeared to be T2Nx. She was subsequently referred for a breast MRI.  The BMRI done on 7/10/20 showed a 4.5 x 2.4 x 3 cm mass in the R upper, inner breast. She underwent a PET/CT 7/16 and started neoadjuvant treatment with Docetaxel/Carboplatin/Trastuzumab/Pertuzumab (TCHP) 7/17/20.  Lymph nodes appeared normal on MRI and PET.     She completed five cycles of TCHP 10/9/20, had a sixth cycle with just herceptin and perjeta on 10/30/20.  She had significant problems with fatigue, nausea, weight loss and diarrhea.  As a result, her treatment was stopped one cycle early.     She underwent bilateral mastectomies and sentinel lymph node biopsy on 11/11/20 with Dr Marquez Vargas and Dr Piotr Ochoa. She had a pathologic CR. She was continued on adjuvant trastuzumab/pertuzumab on 11/23/20 with a plan for one year of adjuvant therapy. She was also started on adjuvant tamoxifen.       Interval history: Leah is doing well. Feels much better on Herceptin/Perjeta than when on chemotherapy. Energy is good.  She has returned to working from home.  Her hair is growing back.  Her neuropathy has completely resolved.  She continues to have 3 bowel movements per day which is unchanged.  She has no problems with that and she is not taking any antidiarrheal medications.  Denies any shortness of breath chest pain, lower extremity edema.  She plans to undergo reconstructive breast surgery on April 8.  She had a few days of heavy fatigue, indigestion, \"chemo brain\", heartburn that lasted for 2 days after the last Herceptin/Perjeta infusion.  This resolved spontaneously on its own.  She did not experience same symptoms with the prior cycles of Herceptin/Perjeta.  She is unsure if this is related to infusion are if it was due to external factors.  She did not take any other medications like Benadryl or Ativan with this infusion.      Overall she is " very happy with how things are continuing to improve not having too many side effects with the current treatment unlike how she felt while on chemotherapy.     ROS: 10 point ROS neg other than the symptoms noted above in the HPI.      Current Outpatient Medications   Medication Sig Dispense Refill     diazepam (VALIUM) 5 MG tablet Take one tablet one hour prior to scan. May repeat dose one time. (Patient not taking: Reported on 11/23/2020) 2 tablet 0     diphenhydrAMINE-acetaminophen (TYLENOL PM)  MG tablet Take 1 tablet by mouth nightly as needed for sleep       lidocaine (XYLOCAINE) 2 % external gel Apply topically 3 times daily as needed for moderate pain 30 mL 1     lidocaine-prilocaine (EMLA) 2.5-2.5 % external cream Apply topically as needed for moderate pain Apply to PORT site 45 minutes prior to procedure.  Cover with a non-absorbent dressing. 30 g 4     LORazepam (ATIVAN) 0.5 MG tablet        LORazepam (ATIVAN) 0.5 MG tablet Take 1 tablet (0.5 mg) by mouth every 4 hours as needed (Anxiety, Nausea/Vomiting or Sleep) 30 tablet 3     magic mouthwash suspension, diphenhydrAMINE, lidocaine, aluminum-magnesium & simethicone, (FIRST-MOUTHWASH BLM) compounding kit Swish and swallow 5-10 mLs in mouth every 6 hours as needed for mouth sores (Patient not taking: Reported on 1/8/2021) 237 mL 1     methylphenidate (RITALIN) 5 MG tablet Take 0.5-1 tablets (2.5-5 mg) by mouth 2 times daily as needed (fatigue) Take no later than 2pm (Patient not taking: Reported on 1/8/2021) 60 tablet 0     morphine (MSIR) 15 MG IR tablet Take 0.5-1 tablets (7.5-15 mg) by mouth every 4 hours as needed for severe pain (Patient not taking: Reported on 1/8/2021) 45 tablet 0     order for DME Equipment: Wig  Diagnosis: cranial alopecia 1 each 0     predniSONE (DELTASONE) 20 MG tablet Take 1 tablet (20 mg) by mouth daily (Patient not taking: Reported on 1/8/2021) 5 tablet 0     prochlorperazine (COMPAZINE) 10 MG tablet Take 1 tablet (10  mg) by mouth every 6 hours as needed (Nausea/Vomiting) (Patient not taking: Reported on 1/8/2021) 30 tablet 3     tamoxifen (NOLVADEX) 20 MG tablet Take 1 tablet (20 mg) by mouth daily 90 tablet 3     Vaginal Lubricant (REPLENS) GEL Apply vaginally as needed (Patient not taking: Reported on 1/8/2021) 35 g 1          Allergies   Allergen Reactions     Codeine Other (See Comments)     Got hyper         Video physical exam  General: Patient appears well in no acute distress.   Skin: No visualized rash or lesions on visualized skin. Scalp shows short hair.  Eyes: EOMI, no erythema, sclera icterus or discharge noted  Resp: Appears to be breathing comfortably without accessory muscle usage, speaking in full sentences, no cough  MSK: Appears to have normal range of motion based on visualized movements  Neurologic: No apparent tremors, facial movements symmetric  Psych: affect pleasant, engaged, alert and oriented    The rest of a comprehensive physical examination is deferred due to PHE (public health emergency) video restrictions       Wt Readings from Last 4 Encounters:   01/29/21 49.4 kg (109 lb)   01/08/21 49.3 kg (108 lb 9.6 oz)   12/18/20 48.6 kg (107 lb 1.6 oz)   12/15/20 47.6 kg (105 lb)         Labs: n/a    Imaging: n/a    Impression/plan:   ER/HI +, HER-2 positive R upper/inner quadrant breast cancer. Clinically T2N0. Treated with neoadjuvant TCHP x 6 cycles and herceptin/perjeta alone with cycle 6. S/P bilateral mastecomies and sentinel LN bx on 11/11/20 with a pathologic CR.  -continued with adjuvant herceptin/perjeta on 11/23/20, plan to continue every 3 weeks for a total of 1 year of therapy.  We reviewed the schedule and in her postop cycle #14 which is the last cycle with fall on 8/27/2021.  -We will hold or adjust the infusion on 2 April to give her a break before surgery on 8 April, although it is not absolutely required.  -Continue on tamoxifen 20 mg daily  -tolerating well, better than when on  neoadjuvant chemotherapy.  No significant side effects from any of the treatments.  Neuropathy has completely resolved.  3 bowel movements per day but not bothersome and not using antidiarrheal.  -New every 3-week infusions.  -Follow-up with YANY visit in 2 months following breast surgery.    Cardiac monitoring while on HER2 therapy  -echo every 3 months, last done November 2020, will schedule next one on 312.    Diarrhea  -chronic, managing well without needing any antidiarrheals. call if diarrhea is worsening, difficult to control.    Fatigue  -much better since stopping chemotherapy.  Return to work from home.    Nausea s/t chemotherapy--resolved    Peripheral neuropathy s/t chemotherapy  -Was grade 1, now completely resolved.    Plan of care discussed with Dino Livingston  [facility] Onc Fellow.     Addendum:  Pt was seen and evaluated by me with Dr Eagle.  I was present for key portions of the video visit and have outlined the plan above to demonstrate these key findings.    No signs of recurrence by history or video exam.    We reviewed the overall guidelines to complete 14 cycles of trastuzumab and perjeta.  We will delay her therapy on 4/2 until after her second stage reconstruction.      Continue tamoxifen.    We reviewed survivorship guidelines and resources available this spring through the Thrive series and the annual survivorship conference.      Lucita Gerardo MD

## 2021-02-11 NOTE — LETTER
"    2/11/2021         RE: Leah Rangel  [address] [place] [address]        Dear Colleague,    Thank you for referring your patient, Leah Rangel, to the [facility]. Please see a copy of my visit note below.    Leah is a 41 year old who is being evaluated via a billable video visit.      How would you like to obtain your AVS? MyChart  If the video visit is dropped, the invitation should be resent by: Text to cell phone: [phone]  Will anyone else be joining your video visit? No      Vitals - Patient Reported  Weight (Patient Reported): 47.6 kg (105 lb)  Height (Patient Reported): 162.6 cm (5' 4\")  BMI (Based on Pt Reported Ht/Wt): 18.02  Pain Score: No Pain (0)      I have reviewed and updated patient's allergy and medication list.    Concerns: NONE  Refills: NONE      Stephanie Cook CMA    Video Start Time: 840  Video-Visit Details    Type of service:  Video Visit    Video End Time:910    Originating Location (pt. Location): Home    Distant Location (provider location):  [facility]     Platform used for Video Visit: [employer]        Reason for Visit: follow-up invasive ductal carcinoma of the R breast, grade 3, ER positive, ND positive, HER-2 positive 3+ by IHC    Oncology HPI:   Leah Rangel is a  40 year old, premenopausal female, diagnosed with R breast cancer, T2Nx right breast cancer.      Leah reports that she has a history of bilateral implants.  She noticed a palpable mass involving her right breast in the upper inner quadrant in early 06/2020.  At that time, she went in for an evaluation with a mammogram and ultrasound.  Mammogram on 06/11/2020 revealed a 7 mm mass at the 1 o'clock position approximately 5 cm from the nipple in the upper inner quadrant of the right breast.  There was a second area measuring 6 x 5 x 4 mm at the 1 o'clock position 6 cm from the nipple.  In the left breast, there was an area of " "microcalcifications.  She was referred for a right breast biopsy.  Both areas at the 1 o'clock position were biopsied.  No lymph nodes were appreciated.  Both of these biopsies revealed an invasive ductal carcinoma, grade 3, ER positive, HI positive, HER-2 positive, 3+ by IHC.  She  was considered to have a T1b multifocal N1 breast cancer.  On further review, it appeared to be T2Nx. She was subsequently referred for a breast MRI.  The BMRI done on 7/10/20 showed a 4.5 x 2.4 x 3 cm mass in the R upper, inner breast. She underwent a PET/CT 7/16 and started neoadjuvant treatment with Docetaxel/Carboplatin/Trastuzumab/Pertuzumab (TCHP) 7/17/20.  Lymph nodes appeared normal on MRI and PET.     She completed five cycles of TCHP 10/9/20, had a sixth cycle with just herceptin and perjeta on 10/30/20.  She had significant problems with fatigue, nausea, weight loss and diarrhea.  As a result, her treatment was stopped one cycle early.     She underwent bilateral mastectomies and sentinel lymph node biopsy on 11/11/20 with Dr Marquez Vargas and Dr Piotr Ochoa. She had a pathologic CR. She was continued on adjuvant trastuzumab/pertuzumab on 11/23/20 with a plan for one year of adjuvant therapy. She was also started on adjuvant tamoxifen.       Interval history: Leah is doing well. Feels much better on Herceptin/Perjeta than when on chemotherapy. Energy is good.  She has returned to working from home.  Her hair is growing back.  Her neuropathy has completely resolved.  She continues to have 3 bowel movements per day which is unchanged.  She has no problems with that and she is not taking any antidiarrheal medications.  Denies any shortness of breath chest pain, lower extremity edema.  She plans to undergo reconstructive breast surgery on April 8.  She had a few days of heavy fatigue, indigestion, \"chemo brain\", heartburn that lasted for 2 days after the last Herceptin/Perjeta infusion.  This resolved spontaneously on its own.  " She did not experience same symptoms with the prior cycles of Herceptin/Perjeta.  She is unsure if this is related to infusion are if it was due to external factors.  She did not take any other medications like Benadryl or Ativan with this infusion.      Overall she is very happy with how things are continuing to improve not having too many side effects with the current treatment unlike how she felt while on chemotherapy.     ROS: 10 point ROS neg other than the symptoms noted above in the HPI.      Current Outpatient Medications   Medication Sig Dispense Refill     diazepam (VALIUM) 5 MG tablet Take one tablet one hour prior to scan. May repeat dose one time. (Patient not taking: Reported on 11/23/2020) 2 tablet 0     diphenhydrAMINE-acetaminophen (TYLENOL PM)  MG tablet Take 1 tablet by mouth nightly as needed for sleep       lidocaine (XYLOCAINE) 2 % external gel Apply topically 3 times daily as needed for moderate pain 30 mL 1     lidocaine-prilocaine (EMLA) 2.5-2.5 % external cream Apply topically as needed for moderate pain Apply to PORT site 45 minutes prior to procedure.  Cover with a non-absorbent dressing. 30 g 4     LORazepam (ATIVAN) 0.5 MG tablet        LORazepam (ATIVAN) 0.5 MG tablet Take 1 tablet (0.5 mg) by mouth every 4 hours as needed (Anxiety, Nausea/Vomiting or Sleep) 30 tablet 3     magic mouthwash suspension, diphenhydrAMINE, lidocaine, aluminum-magnesium & simethicone, (FIRST-MOUTHWASH BLM) compounding kit Swish and swallow 5-10 mLs in mouth every 6 hours as needed for mouth sores (Patient not taking: Reported on 1/8/2021) 237 mL 1     methylphenidate (RITALIN) 5 MG tablet Take 0.5-1 tablets (2.5-5 mg) by mouth 2 times daily as needed (fatigue) Take no later than 2pm (Patient not taking: Reported on 1/8/2021) 60 tablet 0     morphine (MSIR) 15 MG IR tablet Take 0.5-1 tablets (7.5-15 mg) by mouth every 4 hours as needed for severe pain (Patient not taking: Reported on 1/8/2021) 45 tablet  0     order for DME Equipment: Wig  Diagnosis: cranial alopecia 1 each 0     predniSONE (DELTASONE) 20 MG tablet Take 1 tablet (20 mg) by mouth daily (Patient not taking: Reported on 1/8/2021) 5 tablet 0     prochlorperazine (COMPAZINE) 10 MG tablet Take 1 tablet (10 mg) by mouth every 6 hours as needed (Nausea/Vomiting) (Patient not taking: Reported on 1/8/2021) 30 tablet 3     tamoxifen (NOLVADEX) 20 MG tablet Take 1 tablet (20 mg) by mouth daily 90 tablet 3     Vaginal Lubricant (REPLENS) GEL Apply vaginally as needed (Patient not taking: Reported on 1/8/2021) 35 g 1          Allergies   Allergen Reactions     Codeine Other (See Comments)     Got hyper         Video physical exam  General: Patient appears well in no acute distress.   Skin: No visualized rash or lesions on visualized skin. Scalp shows short hair.  Eyes: EOMI, no erythema, sclera icterus or discharge noted  Resp: Appears to be breathing comfortably without accessory muscle usage, speaking in full sentences, no cough  MSK: Appears to have normal range of motion based on visualized movements  Neurologic: No apparent tremors, facial movements symmetric  Psych: affect pleasant, engaged, alert and oriented    The rest of a comprehensive physical examination is deferred due to PHE (public health emergency) video restrictions       Wt Readings from Last 4 Encounters:   01/29/21 49.4 kg (109 lb)   01/08/21 49.3 kg (108 lb 9.6 oz)   12/18/20 48.6 kg (107 lb 1.6 oz)   12/15/20 47.6 kg (105 lb)         Labs: n/a    Imaging: n/a    Impression/plan:   ER/OR +, HER-2 positive R upper/inner quadrant breast cancer. Clinically T2N0. Treated with neoadjuvant TCHP x 6 cycles and herceptin/perjeta alone with cycle 6. S/P bilateral mastecomies and sentinel LN bx on 11/11/20 with a pathologic CR.  -continued with adjuvant herceptin/perjeta on 11/23/20, plan to continue every 3 weeks for a total of 1 year of therapy.  We reviewed the schedule and in her postop cycle #14  which is the last cycle with fall on 8/27/2021.  -We will hold or adjust the infusion on 2 April to give her a break before surgery on 8 April, although it is not absolutely required.  -Continue on tamoxifen 20 mg daily  -tolerating well, better than when on neoadjuvant chemotherapy.  No significant side effects from any of the treatments.  Neuropathy has completely resolved.  3 bowel movements per day but not bothersome and not using antidiarrheal.  -New every 3-week infusions.  -Follow-up with YANY visit in 2 months following breast surgery.    Cardiac monitoring while on HER2 therapy  -echo every 3 months, last done November 2020, will schedule next one on 312.    Diarrhea  -chronic, managing well without needing any antidiarrheals. call if diarrhea is worsening, difficult to control.    Fatigue  -much better since stopping chemotherapy.  Return to work from home.    Nausea s/t chemotherapy--resolved    Peripheral neuropathy s/t chemotherapy  -Was grade 1, now completely resolved.    Plan of care discussed with Dino Livingston  [facility] Onc Fellow.     Addendum:  Pt was seen and evaluated by me with Dr Eagle.  I was present for key portions of the video visit and have outlined the plan above to demonstrate these key findings.    No signs of recurrence by history or video exam.    We reviewed the overall guidelines to complete 14 cycles of trastuzumab and perjeta.  We will delay her therapy on 4/2 until after her second stage reconstruction.      Continue tamoxifen.    We reviewed survivorship guidelines and resources available this spring through the Thrive series and the annual survivorship conference.      Lucita Gerardo MD

## 2021-02-19 ENCOUNTER — INFUSION THERAPY VISIT (OUTPATIENT)
Dept: ONCOLOGY | Facility: CLINIC | Age: 41
End: 2021-02-19
Attending: INTERNAL MEDICINE
Payer: COMMERCIAL

## 2021-02-19 VITALS
DIASTOLIC BLOOD PRESSURE: 84 MMHG | TEMPERATURE: 98.8 F | RESPIRATION RATE: 16 BRPM | SYSTOLIC BLOOD PRESSURE: 122 MMHG | OXYGEN SATURATION: 97 % | HEART RATE: 88 BPM | BODY MASS INDEX: 18.71 KG/M2 | WEIGHT: 109 LBS

## 2021-02-19 DIAGNOSIS — Z17.0 MALIGNANT NEOPLASM OF UPPER-INNER QUADRANT OF RIGHT BREAST IN FEMALE, ESTROGEN RECEPTOR POSITIVE (H): ICD-10-CM

## 2021-02-19 DIAGNOSIS — C50.211 MALIGNANT NEOPLASM OF UPPER-INNER QUADRANT OF RIGHT BREAST IN FEMALE, ESTROGEN RECEPTOR POSITIVE (H): Primary | ICD-10-CM

## 2021-02-19 DIAGNOSIS — C50.211 MALIGNANT NEOPLASM OF UPPER-INNER QUADRANT OF RIGHT BREAST IN FEMALE, ESTROGEN RECEPTOR POSITIVE (H): ICD-10-CM

## 2021-02-19 DIAGNOSIS — Z17.0 MALIGNANT NEOPLASM OF UPPER-INNER QUADRANT OF RIGHT BREAST IN FEMALE, ESTROGEN RECEPTOR POSITIVE (H): Primary | ICD-10-CM

## 2021-02-19 PROCEDURE — 96413 CHEMO IV INFUSION 1 HR: CPT

## 2021-02-19 PROCEDURE — 96417 CHEMO IV INFUS EACH ADDL SEQ: CPT

## 2021-02-19 PROCEDURE — 250N000011 HC RX IP 250 OP 636: Performed by: INTERNAL MEDICINE

## 2021-02-19 PROCEDURE — 258N000003 HC RX IP 258 OP 636: Performed by: INTERNAL MEDICINE

## 2021-02-19 RX ORDER — LORAZEPAM 0.5 MG/1
0.5 TABLET ORAL EVERY 4 HOURS PRN
Qty: 30 TABLET | Refills: 3 | Status: SHIPPED | OUTPATIENT
Start: 2021-02-19 | End: 2021-08-09

## 2021-02-19 RX ORDER — HEPARIN SODIUM (PORCINE) LOCK FLUSH IV SOLN 100 UNIT/ML 100 UNIT/ML
5 SOLUTION INTRAVENOUS
Status: DISCONTINUED | OUTPATIENT
Start: 2021-02-19 | End: 2021-02-19 | Stop reason: HOSPADM

## 2021-02-19 RX ORDER — LORAZEPAM 0.5 MG/1
TABLET ORAL
Qty: 30 TABLET | Refills: 3 | OUTPATIENT
Start: 2021-02-19

## 2021-02-19 RX ORDER — LORAZEPAM 0.5 MG/1
0.5 TABLET ORAL EVERY 4 HOURS PRN
Qty: 30 TABLET | Refills: 3 | Status: SHIPPED | OUTPATIENT
Start: 2021-02-19 | End: 2021-02-19

## 2021-02-19 RX ADMIN — Medication 5 ML: at 11:21

## 2021-02-19 RX ADMIN — SODIUM CHLORIDE 250 ML: 9 INJECTION, SOLUTION INTRAVENOUS at 09:33

## 2021-02-19 RX ADMIN — TRASTUZUMAB-ANNS 290 MG: 420 INJECTION, POWDER, LYOPHILIZED, FOR SOLUTION INTRAVENOUS at 10:46

## 2021-02-19 RX ADMIN — PERTUZUMAB 420 MG: 30 INJECTION, SOLUTION, CONCENTRATE INTRAVENOUS at 10:06

## 2021-02-19 ASSESSMENT — PAIN SCALES - GENERAL: PAINLEVEL: NO PAIN (0)

## 2021-02-19 NOTE — PROGRESS NOTES
Infusion Nursing Note:  Leah Rangel presents today for C5 D1 Perjeta/Aditya.    Patient seen by provider today: No   present during visit today: Not Applicable.    Note: Patient arrives feeling well. Main complaints are fatigue and chronic diarrhea. Takes imodium regularly and denies further concern.    Intravenous Access:  Implanted Port.    Treatment Conditions:  ECHO/MUGA completed 11/6/20  EF 60-65%. Next ECHO 3/12/21.      Post Infusion Assessment:  Patient tolerated infusion without incident.  Blood return noted pre and post infusion.  Site patent and intact, free from redness, edema or discomfort.  No evidence of extravasations.  Access discontinued per protocol.       Discharge Plan:   Prescription refills given for Ativan. New script sent to local [facility]'s in [place].  Discharge instructions reviewed with: Patient.  Patient and/or family verbalized understanding of discharge instructions and all questions answered.  AVS to patient via [employer].  Patient will return 3/12 for next appointment.   Patient discharged in stable condition accompanied by: self.  Departure Mode: Ambulatory.    Estelle Palma RN

## 2021-03-12 ENCOUNTER — INFUSION THERAPY VISIT (OUTPATIENT)
Dept: ONCOLOGY | Facility: CLINIC | Age: 41
End: 2021-03-12
Attending: INTERNAL MEDICINE
Payer: COMMERCIAL

## 2021-03-12 ENCOUNTER — ANCILLARY PROCEDURE (OUTPATIENT)
Dept: CARDIOLOGY | Facility: CLINIC | Age: 41
End: 2021-03-12
Attending: INTERNAL MEDICINE
Payer: COMMERCIAL

## 2021-03-12 VITALS
TEMPERATURE: 98.9 F | OXYGEN SATURATION: 96 % | HEART RATE: 75 BPM | RESPIRATION RATE: 16 BRPM | DIASTOLIC BLOOD PRESSURE: 76 MMHG | BODY MASS INDEX: 18.35 KG/M2 | WEIGHT: 106.9 LBS | SYSTOLIC BLOOD PRESSURE: 114 MMHG

## 2021-03-12 DIAGNOSIS — Z17.0 MALIGNANT NEOPLASM OF UPPER-INNER QUADRANT OF RIGHT BREAST IN FEMALE, ESTROGEN RECEPTOR POSITIVE (H): Primary | ICD-10-CM

## 2021-03-12 DIAGNOSIS — Z51.11 ENCOUNTER FOR ANTINEOPLASTIC CHEMOTHERAPY: ICD-10-CM

## 2021-03-12 DIAGNOSIS — Z17.0 MALIGNANT NEOPLASM OF UPPER-INNER QUADRANT OF RIGHT BREAST IN FEMALE, ESTROGEN RECEPTOR POSITIVE (H): ICD-10-CM

## 2021-03-12 DIAGNOSIS — C50.211 MALIGNANT NEOPLASM OF UPPER-INNER QUADRANT OF RIGHT BREAST IN FEMALE, ESTROGEN RECEPTOR POSITIVE (H): Primary | ICD-10-CM

## 2021-03-12 DIAGNOSIS — C50.211 MALIGNANT NEOPLASM OF UPPER-INNER QUADRANT OF RIGHT BREAST IN FEMALE, ESTROGEN RECEPTOR POSITIVE (H): ICD-10-CM

## 2021-03-12 PROCEDURE — 93325 DOPPLER ECHO COLOR FLOW MAPG: CPT | Performed by: INTERNAL MEDICINE

## 2021-03-12 PROCEDURE — 96413 CHEMO IV INFUSION 1 HR: CPT

## 2021-03-12 PROCEDURE — 93321 DOPPLER ECHO F-UP/LMTD STD: CPT | Performed by: INTERNAL MEDICINE

## 2021-03-12 PROCEDURE — 93308 TTE F-UP OR LMTD: CPT | Performed by: INTERNAL MEDICINE

## 2021-03-12 PROCEDURE — 250N000011 HC RX IP 250 OP 636: Performed by: INTERNAL MEDICINE

## 2021-03-12 PROCEDURE — 258N000003 HC RX IP 258 OP 636: Performed by: INTERNAL MEDICINE

## 2021-03-12 PROCEDURE — 76376 3D RENDER W/INTRP POSTPROCES: CPT | Performed by: INTERNAL MEDICINE

## 2021-03-12 PROCEDURE — 96417 CHEMO IV INFUS EACH ADDL SEQ: CPT

## 2021-03-12 RX ORDER — HEPARIN SODIUM (PORCINE) LOCK FLUSH IV SOLN 100 UNIT/ML 100 UNIT/ML
5 SOLUTION INTRAVENOUS
Status: DISCONTINUED | OUTPATIENT
Start: 2021-03-12 | End: 2021-03-12 | Stop reason: HOSPADM

## 2021-03-12 RX ADMIN — SODIUM CHLORIDE 250 ML: 9 INJECTION, SOLUTION INTRAVENOUS at 10:39

## 2021-03-12 RX ADMIN — Medication 5 ML: at 12:01

## 2021-03-12 RX ADMIN — PERTUZUMAB 420 MG: 30 INJECTION, SOLUTION, CONCENTRATE INTRAVENOUS at 10:40

## 2021-03-12 RX ADMIN — TRASTUZUMAB-ANNS 290 MG: 420 INJECTION, POWDER, LYOPHILIZED, FOR SOLUTION INTRAVENOUS at 11:23

## 2021-03-12 ASSESSMENT — PAIN SCALES - GENERAL: PAINLEVEL: NO PAIN (0)

## 2021-03-12 NOTE — PROGRESS NOTES
Infusion Nursing Note:  Leah Rangel presents today for Day 1 Cycle 6 Perjeta Herceptin.    Patient seen by provider today: No   present during visit today: Not Applicable.    Note: Leah arrives to infusion today doing well. ECHO completed prior to arrival at infusion. EF 55-60%, which is WNL. Writer reviewed result with patient. She continues to have loose stools, watery eyes, nail changes and fatigue as her main symptoms. She states these symptoms are stable and offers no changes or concerns today.     Intravenous Access:  Implanted Port.    Treatment Conditions:  ECHO/MUGA completed 03/12/21  EF 55-60%.    Post Infusion Assessment:  Patient tolerated infusion without incident.  Blood return noted pre and post infusion.  Access discontinued per protocol.     Discharge Plan:   Patient declined prescription refills.  AVS to patient via [email or web address].  Patient will return 4/2 for next appointment.   Patient discharged in stable condition accompanied by: self.  Departure Mode: Ambulatory.    Yuki La RN

## 2021-03-26 ENCOUNTER — IMMUNIZATION (OUTPATIENT)
Dept: PEDIATRICS | Facility: CLINIC | Age: 41
End: 2021-03-26
Payer: COMMERCIAL

## 2021-03-26 PROCEDURE — 0001A PR COVID VAC PFIZER DIL RECON 30 MCG/0.3 ML IM: CPT

## 2021-03-26 PROCEDURE — 91300 PR COVID VAC PFIZER DIL RECON 30 MCG/0.3 ML IM: CPT

## 2021-03-30 ENCOUNTER — HOSPITAL LABORATORY (OUTPATIENT)
Dept: OTHER | Facility: CLINIC | Age: 41
End: 2021-03-30

## 2021-04-02 ENCOUNTER — INFUSION THERAPY VISIT (OUTPATIENT)
Dept: ONCOLOGY | Facility: CLINIC | Age: 41
End: 2021-04-02
Attending: INTERNAL MEDICINE
Payer: COMMERCIAL

## 2021-04-02 VITALS
DIASTOLIC BLOOD PRESSURE: 65 MMHG | BODY MASS INDEX: 18.37 KG/M2 | RESPIRATION RATE: 16 BRPM | TEMPERATURE: 98.2 F | OXYGEN SATURATION: 96 % | SYSTOLIC BLOOD PRESSURE: 99 MMHG | WEIGHT: 107 LBS | HEART RATE: 101 BPM

## 2021-04-02 DIAGNOSIS — Z17.0 MALIGNANT NEOPLASM OF UPPER-INNER QUADRANT OF RIGHT BREAST IN FEMALE, ESTROGEN RECEPTOR POSITIVE (H): Primary | ICD-10-CM

## 2021-04-02 DIAGNOSIS — C50.211 MALIGNANT NEOPLASM OF UPPER-INNER QUADRANT OF RIGHT BREAST IN FEMALE, ESTROGEN RECEPTOR POSITIVE (H): Primary | ICD-10-CM

## 2021-04-02 LAB
BACTERIA SPEC CULT: ABNORMAL
Lab: ABNORMAL
SPECIMEN SOURCE: ABNORMAL

## 2021-04-02 PROCEDURE — 258N000003 HC RX IP 258 OP 636: Performed by: INTERNAL MEDICINE

## 2021-04-02 PROCEDURE — 96417 CHEMO IV INFUS EACH ADDL SEQ: CPT

## 2021-04-02 PROCEDURE — 250N000011 HC RX IP 250 OP 636: Performed by: INTERNAL MEDICINE

## 2021-04-02 PROCEDURE — 96413 CHEMO IV INFUSION 1 HR: CPT

## 2021-04-02 RX ORDER — HEPARIN SODIUM (PORCINE) LOCK FLUSH IV SOLN 100 UNIT/ML 100 UNIT/ML
5 SOLUTION INTRAVENOUS
Status: DISCONTINUED | OUTPATIENT
Start: 2021-04-02 | End: 2021-04-02 | Stop reason: HOSPADM

## 2021-04-02 RX ORDER — LEVOFLOXACIN 500 MG/1
500 TABLET, FILM COATED ORAL DAILY
COMMUNITY
Start: 2021-03-30 | End: 2021-04-08

## 2021-04-02 RX ORDER — CLINDAMYCIN HCL 300 MG
300 CAPSULE ORAL 3 TIMES DAILY
COMMUNITY
Start: 2021-03-30 | End: 2021-04-05

## 2021-04-02 RX ADMIN — TRASTUZUMAB-ANNS 290 MG: 420 INJECTION, POWDER, LYOPHILIZED, FOR SOLUTION INTRAVENOUS at 11:21

## 2021-04-02 RX ADMIN — PERTUZUMAB 420 MG: 30 INJECTION, SOLUTION, CONCENTRATE INTRAVENOUS at 10:46

## 2021-04-02 RX ADMIN — Medication 5 ML: at 11:56

## 2021-04-02 RX ADMIN — SODIUM CHLORIDE 250 ML: 9 INJECTION, SOLUTION INTRAVENOUS at 10:46

## 2021-04-02 ASSESSMENT — PAIN SCALES - GENERAL: PAINLEVEL: MODERATE PAIN (4)

## 2021-04-02 NOTE — PROGRESS NOTES
Infusion Nursing Note:  Leah Rangel presents today for Day 1 Cycle 7 Roro Burgess.    Patient seen by provider today: No   present during visit today: Not Applicable.    Note: Michaela arrives to infusion today doing well. She had reconstruction surgery several weeks ago and has a drain for a hematoma that developed post-op. She is hoping to have it removed today. She has some pain at the drain site, which she rates 4/10 and is taking Percocet. She otherwise feels great and denies loose stools, watery eyes, fatigue or any other symptoms usual with her chemotherapy.     Intravenous Access:  Implanted Port.    Post Infusion Assessment:  Patient tolerated infusion without incident.  Blood return noted pre and post infusion.  Access discontinued per protocol.     Discharge Plan:   Patient declined prescription refills.  AVS to patient via [employer].  Patient will return 4/22 for MD visit and 4/23 for next infusion appointment.   Patient discharged in stable condition accompanied by: self.    Yuki La RN

## 2021-04-02 NOTE — PATIENT INSTRUCTIONS
Contact Numbers  [facility]: [phone] (for symptom and scheduling needs)    Please call the [facility] Triage line if you experience a temperature greater than or equal to 100.4, shaking chills, have uncontrolled nausea, vomiting and/or diarrhea, dizziness, shortness of breath, chest pain, bleeding, unexplained bruising, or if you have any other new/concerning symptoms, questions or concerns.     If you are having any concerning symptoms or wish to speak to a provider before your next infusion visit, please call your care coordinator or triage to notify them so we can adequately serve you.     If you need a refill on a narcotic prescription or other medication, please call triage before your infusion appointment.          April 2021 Sunday Monday Tuesday Wednesday Thursday Friday Saturday                       1     2    [facility] ONC INFUSION 360   9:30 AM   (360 min.)    ONCOLOGY INFUSION   [facility] 3       4     5     6     7     8     9     10       11     12     13     14     15     16    COVID-19 PFIZER (21 DAY)  12:25 PM   (5 min.)   MG COVID VACCINE 21 DAY PFIZER   [facility] 17       18     19     20     21     22    VIDEO VISIT RETURN   8:15 AM   (50 min.)   Tahmina Roberson PA-C   [facility] 23    [facility] ONC INFUSION 360   9:30 AM   (360 min.)    ONCOLOGY INFUSION   [facility] 24       25     26     27     28     29     30                      May 2021      [place] Monday Tuesday Wednesday Thursday Friday Saturday                                 1       2     3     4     5     6     7     8       9     10     11     12     13     14    [facility] ONC INFUSION 360   9:30 AM   (360 min.)   [facility] ONCOLOGY INFUSION   [facility] 15       16     17     18     19     20     21     22       23     24     25     26     27     28     29       30     31                                               Lab Results:  No results found for this or any previous visit (from the past 12 hour(s)).

## 2021-04-07 LAB
BACTERIA SPEC CULT: ABNORMAL
BACTERIA SPEC CULT: ABNORMAL
Lab: ABNORMAL
SPECIMEN SOURCE: ABNORMAL

## 2021-04-16 ENCOUNTER — IMMUNIZATION (OUTPATIENT)
Dept: PEDIATRICS | Facility: CLINIC | Age: 41
End: 2021-04-16
Attending: INTERNAL MEDICINE
Payer: COMMERCIAL

## 2021-04-16 PROCEDURE — 0002A PR COVID VAC PFIZER DIL RECON 30 MCG/0.3 ML IM: CPT

## 2021-04-16 PROCEDURE — 91300 PR COVID VAC PFIZER DIL RECON 30 MCG/0.3 ML IM: CPT

## 2021-04-21 ENCOUNTER — VIRTUAL VISIT (OUTPATIENT)
Dept: ONCOLOGY | Facility: CLINIC | Age: 41
End: 2021-04-21
Attending: PHYSICIAN ASSISTANT
Payer: COMMERCIAL

## 2021-04-21 DIAGNOSIS — Z17.0 MALIGNANT NEOPLASM OF UPPER-INNER QUADRANT OF RIGHT BREAST IN FEMALE, ESTROGEN RECEPTOR POSITIVE (H): Primary | ICD-10-CM

## 2021-04-21 DIAGNOSIS — Z51.11 ENCOUNTER FOR ANTINEOPLASTIC CHEMOTHERAPY: ICD-10-CM

## 2021-04-21 DIAGNOSIS — Z91.89 AT RISK FOR CARDIOMYOPATHY: ICD-10-CM

## 2021-04-21 DIAGNOSIS — C50.211 MALIGNANT NEOPLASM OF UPPER-INNER QUADRANT OF RIGHT BREAST IN FEMALE, ESTROGEN RECEPTOR POSITIVE (H): Primary | ICD-10-CM

## 2021-04-21 PROCEDURE — 999N001193 HC VIDEO/TELEPHONE VISIT; NO CHARGE

## 2021-04-21 PROCEDURE — 99215 OFFICE O/P EST HI 40 MIN: CPT | Mod: 95 | Performed by: PHYSICIAN ASSISTANT

## 2021-04-21 NOTE — LETTER
4/21/2021         RE: Leah Rangel  [address] [place] [address]        Dear Colleague,    Thank you for referring your patient, Leah Rangel, to the [facility]. Please see a copy of my visit note below.    Leah is a 41 year old who is being evaluated via a billable video visit.      How would you like to obtain your AVS? MyChart  If the video visit is dropped, the invitation should be resent by: Text to cell phone: [phone]  Will anyone else be joining your video visit? No      Video Start Time: 2:44 pm  Video-Visit Details    Type of service:  Video Visit    Video End Time:3:08 PM    Originating Location (pt. Location): Home    Distant Location (provider location):  [facility]     Platform used for Video Visit: [employer]          Reason for Visit: follow-up invasive ductal carcinoma of the R breast, grade 3, ER positive, GA positive, HER-2 positive 3+ by IHC    Oncology HPI:   Leah Rangel is a  41 year old, premenopausal female, diagnosed with R breast cancer, T2N0 right breast cancer.      Leah reports that she has a history of bilateral implants.  She noticed a palpable mass involving her right breast in the upper inner quadrant in early 06/2020.  At that time, she went in for an evaluation with a mammogram and ultrasound.  Mammogram on 06/11/2020 revealed a 7 mm mass at the 1 o'clock position approximately 5 cm from the nipple in the upper inner quadrant of the right breast.  There was a second area measuring 6 x 5 x 4 mm at the 1 o'clock position 6 cm from the nipple.  In the left breast, there was an area of microcalcifications.  She was referred for a right breast biopsy.  Both areas at the 1 o'clock position were biopsied.  No lymph nodes were appreciated.  Both of these biopsies revealed an invasive ductal carcinoma, grade 3, ER positive, GA positive, HER-2 positive, 3+ by IHC.  She  was considered to have a T1b multifocal  N1 breast cancer.  On further review, it appeared to be T2Nx. She was subsequently referred for a breast MRI.  The BMRI done on 7/10/20 showed a 4.5 x 2.4 x 3 cm mass in the R upper, inner breast. She underwent a PET/CT 7/16 and started neoadjuvant treatment with Docetaxel/Carboplatin/Trastuzumab/Pertuzumab (TCHP) 7/17/20.  Lymph nodes appeared normal on MRI and PET.     She completed five cycles of TCHP 10/9/20, had a sixth cycle with just herceptin and perjeta on 10/30/20.  She had significant problems with fatigue, nausea, weight loss and diarrhea.  As a result, her treatment was stopped one cycle early.     She underwent bilateral mastectomies and sentinel lymph node biopsy on 11/11/20 with Dr Marquez Vargas and Dr Piotr Ochoa. She had a pathologic CR. She was continued on adjuvant trastuzumab/pertuzumab on 11/23/20 with a plan for one year of adjuvant therapy. She was also started on adjuvant tamoxifen.       Interval history: Leah is feeling well today. She underwent reconstruction surgery on 3/22 and then had to have a second surgery to remove a large hematoma and ?abcess. She has been on levaquin and clindamycin since then and will finish on Monday.     Antibiotics are going okay. She is not having any nausea. Bowels are a little off but not as bad as when she was on chemotherapy.     Tamoxifen and HP are going well. She initially had severe hot flashes with sommer but these resolved after about 6 weeks. Has some fatigue after infusions but that could be mainly from being an excursion out of the house for a few hours.     She has a few questions about surveillance plan and overall likelihood of recurrence.      ROS: 10 point ROS neg other than the symptoms noted above in the HPI.      Current Outpatient Medications   Medication Sig Dispense Refill     diazepam (VALIUM) 5 MG tablet Take one tablet one hour prior to scan. May repeat dose one time. 2 tablet 0     diphenhydrAMINE-acetaminophen (TYLENOL PM)   MG tablet Take 1 tablet by mouth nightly as needed for sleep       lidocaine (XYLOCAINE) 2 % external gel Apply topically 3 times daily as needed for moderate pain (Patient not taking: Reported on 2/19/2021) 30 mL 1     lidocaine-prilocaine (EMLA) 2.5-2.5 % external cream Apply topically as needed for moderate pain Apply to PORT site 45 minutes prior to procedure.  Cover with a non-absorbent dressing. (Patient not taking: Reported on 2/19/2021) 30 g 4     LORazepam (ATIVAN) 0.5 MG tablet Take 1 tablet (0.5 mg) by mouth every 4 hours as needed (Anxiety, Nausea/Vomiting or Sleep) 30 tablet 3     LORazepam (ATIVAN) 0.5 MG tablet        magic mouthwash suspension, diphenhydrAMINE, lidocaine, aluminum-magnesium & simethicone, (FIRST-MOUTHWASH BLM) compounding kit Swish and swallow 5-10 mLs in mouth every 6 hours as needed for mouth sores (Patient not taking: Reported on 1/8/2021) 237 mL 1     methylphenidate (RITALIN) 5 MG tablet Take 0.5-1 tablets (2.5-5 mg) by mouth 2 times daily as needed (fatigue) Take no later than 2pm (Patient not taking: Reported on 1/8/2021) 60 tablet 0     morphine (MSIR) 15 MG IR tablet Take 0.5-1 tablets (7.5-15 mg) by mouth every 4 hours as needed for severe pain (Patient not taking: Reported on 1/8/2021) 45 tablet 0     order for DME Equipment: Wig  Diagnosis: cranial alopecia 1 each 0     predniSONE (DELTASONE) 20 MG tablet Take 1 tablet (20 mg) by mouth daily (Patient not taking: Reported on 1/8/2021) 5 tablet 0     prochlorperazine (COMPAZINE) 10 MG tablet Take 1 tablet (10 mg) by mouth every 6 hours as needed (Nausea/Vomiting) (Patient not taking: Reported on 1/8/2021) 30 tablet 3     tamoxifen (NOLVADEX) 20 MG tablet Take 1 tablet (20 mg) by mouth daily 90 tablet 3     Vaginal Lubricant (REPLENS) GEL Apply vaginally as needed (Patient not taking: Reported on 1/8/2021) 35 g 1          Allergies   Allergen Reactions     Codeine Other (See Comments)     Got hyper         Video physical  exam  General: Patient appears well in no acute distress.   Skin: No visualized rash or lesions on visualized skin. Scalp shows short hair.  Eyes: EOMI, no erythema, sclera icterus or discharge noted  Resp: Appears to be breathing comfortably without accessory muscle usage, speaking in full sentences, no cough  MSK: Appears to have normal range of motion based on visualized movements  Neurologic: No apparent tremors, facial movements symmetric  Psych: affect pleasant, engaged, alert and oriented    The rest of a comprehensive physical examination is deferred due to PHE (public health emergency) video restrictions       Wt Readings from Last 4 Encounters:   04/02/21 48.5 kg (107 lb)   03/12/21 48.5 kg (106 lb 14.4 oz)   02/19/21 49.4 kg (109 lb)   01/29/21 49.4 kg (109 lb)         Labs: n/a    Imaging: n/a    Impression/plan:   ER/NH +, HER-2 positive R upper/inner quadrant breast cancer. Clinically T2N0. Treated with neoadjuvant TCHP x 6 cycles and herceptin/perjeta alone with cycle 6. S/P bilateral mastecomies and sentinel LN bx on 11/11/20 with a pathologic CR.  -continued with adjuvant herceptin/perjeta on 11/23/20, plan to continue every 3 weeks for a total of 1 year of therapy.  We reviewed the schedule and in her postop cycle #14 which is the last cycle with fall on 8/27/2021.  -Continue on tamoxifen 20 mg daily  -tolerating well, better than when on neoadjuvant chemotherapy.  No significant side effects from any of the treatments.    -Will try to find more exact percentages for recurrence risk for her but pCR is an excellent prognostic marker for her. There's a 95% 4 year survival rate for those with HER2+ node negative pCR.   -Follow-up with Dr. Gerardo in June.     Cardiac monitoring while on HER2 therapy  -echo every 3 months, last done 3/12/21, WNL, due June 2021.     Diarrhea  -Minimal. Discussed eating yogurt since she is on heavy antibiotics.     Fatigue  -much better since stopping chemotherapy.     50  minutes spent on the date of the encounter doing chart review, review of outside records, review of test results, interpretation of tests, patient visit and documentation     Tahmina Roberson PA-C         Again, thank you for allowing me to participate in the care of your patient.        Sincerely,        Tahmina Roberson PA-C

## 2021-04-21 NOTE — PROGRESS NOTES
Leah is a 41 year old who is being evaluated via a billable video visit.      How would you like to obtain your AVS? MyChart  If the video visit is dropped, the invitation should be resent by: Text to cell phone: [phone]  Will anyone else be joining your video visit? No      Video Start Time: 2:44 pm  Video-Visit Details    Type of service:  Video Visit    Video End Time:3:08 PM    Originating Location (pt. Location): Home    Distant Location (provider location):  [facility]     Platform used for Video Visit: [employer]          Reason for Visit: follow-up invasive ductal carcinoma of the R breast, grade 3, ER positive, OH positive, HER-2 positive 3+ by IHC    Oncology HPI:   Leah Rangel is a  41 year old, premenopausal female, diagnosed with R breast cancer, T2N0 right breast cancer.      Leah reports that she has a history of bilateral implants.  She noticed a palpable mass involving her right breast in the upper inner quadrant in early 06/2020.  At that time, she went in for an evaluation with a mammogram and ultrasound.  Mammogram on 06/11/2020 revealed a 7 mm mass at the 1 o'clock position approximately 5 cm from the nipple in the upper inner quadrant of the right breast.  There was a second area measuring 6 x 5 x 4 mm at the 1 o'clock position 6 cm from the nipple.  In the left breast, there was an area of microcalcifications.  She was referred for a right breast biopsy.  Both areas at the 1 o'clock position were biopsied.  No lymph nodes were appreciated.  Both of these biopsies revealed an invasive ductal carcinoma, grade 3, ER positive, OH positive, HER-2 positive, 3+ by IHC.  She  was considered to have a T1b multifocal N1 breast cancer.  On further review, it appeared to be T2Nx. She was subsequently referred for a breast MRI.  The BMRI done on 7/10/20 showed a 4.5 x 2.4 x 3 cm mass in the R upper, inner breast. She underwent a PET/CT 7/16 and started neoadjuvant  treatment with Docetaxel/Carboplatin/Trastuzumab/Pertuzumab (TCHP) 7/17/20.  Lymph nodes appeared normal on MRI and PET.     She completed five cycles of TCHP 10/9/20, had a sixth cycle with just herceptin and perjeta on 10/30/20.  She had significant problems with fatigue, nausea, weight loss and diarrhea.  As a result, her treatment was stopped one cycle early.     She underwent bilateral mastectomies and sentinel lymph node biopsy on 11/11/20 with Dr Marquez Vargas and Dr Piotr Ochoa. She had a pathologic CR. She was continued on adjuvant trastuzumab/pertuzumab on 11/23/20 with a plan for one year of adjuvant therapy. She was also started on adjuvant tamoxifen.       Interval history: Leah is feeling well today. She underwent reconstruction surgery on 3/22 and then had to have a second surgery to remove a large hematoma and ?abcess. She has been on levaquin and clindamycin since then and will finish on Monday.     Antibiotics are going okay. She is not having any nausea. Bowels are a little off but not as bad as when she was on chemotherapy.     Tamoxifen and HP are going well. She initially had severe hot flashes with sommer but these resolved after about 6 weeks. Has some fatigue after infusions but that could be mainly from being an excursion out of the house for a few hours.     She has a few questions about surveillance plan and overall likelihood of recurrence.      ROS: 10 point ROS neg other than the symptoms noted above in the HPI.      Current Outpatient Medications   Medication Sig Dispense Refill     diazepam (VALIUM) 5 MG tablet Take one tablet one hour prior to scan. May repeat dose one time. 2 tablet 0     diphenhydrAMINE-acetaminophen (TYLENOL PM)  MG tablet Take 1 tablet by mouth nightly as needed for sleep       lidocaine (XYLOCAINE) 2 % external gel Apply topically 3 times daily as needed for moderate pain (Patient not taking: Reported on 2/19/2021) 30 mL 1     lidocaine-prilocaine (EMLA)  2.5-2.5 % external cream Apply topically as needed for moderate pain Apply to PORT site 45 minutes prior to procedure.  Cover with a non-absorbent dressing. (Patient not taking: Reported on 2/19/2021) 30 g 4     LORazepam (ATIVAN) 0.5 MG tablet Take 1 tablet (0.5 mg) by mouth every 4 hours as needed (Anxiety, Nausea/Vomiting or Sleep) 30 tablet 3     LORazepam (ATIVAN) 0.5 MG tablet        magic mouthwash suspension, diphenhydrAMINE, lidocaine, aluminum-magnesium & simethicone, (FIRST-MOUTHWASH BLM) compounding kit Swish and swallow 5-10 mLs in mouth every 6 hours as needed for mouth sores (Patient not taking: Reported on 1/8/2021) 237 mL 1     methylphenidate (RITALIN) 5 MG tablet Take 0.5-1 tablets (2.5-5 mg) by mouth 2 times daily as needed (fatigue) Take no later than 2pm (Patient not taking: Reported on 1/8/2021) 60 tablet 0     morphine (MSIR) 15 MG IR tablet Take 0.5-1 tablets (7.5-15 mg) by mouth every 4 hours as needed for severe pain (Patient not taking: Reported on 1/8/2021) 45 tablet 0     order for DME Equipment: Wig  Diagnosis: cranial alopecia 1 each 0     predniSONE (DELTASONE) 20 MG tablet Take 1 tablet (20 mg) by mouth daily (Patient not taking: Reported on 1/8/2021) 5 tablet 0     prochlorperazine (COMPAZINE) 10 MG tablet Take 1 tablet (10 mg) by mouth every 6 hours as needed (Nausea/Vomiting) (Patient not taking: Reported on 1/8/2021) 30 tablet 3     tamoxifen (NOLVADEX) 20 MG tablet Take 1 tablet (20 mg) by mouth daily 90 tablet 3     Vaginal Lubricant (REPLENS) GEL Apply vaginally as needed (Patient not taking: Reported on 1/8/2021) 35 g 1          Allergies   Allergen Reactions     Codeine Other (See Comments)     Got hyper         Video physical exam  General: Patient appears well in no acute distress.   Skin: No visualized rash or lesions on visualized skin. Scalp shows short hair.  Eyes: EOMI, no erythema, sclera icterus or discharge noted  Resp: Appears to be breathing comfortably without  accessory muscle usage, speaking in full sentences, no cough  MSK: Appears to have normal range of motion based on visualized movements  Neurologic: No apparent tremors, facial movements symmetric  Psych: affect pleasant, engaged, alert and oriented    The rest of a comprehensive physical examination is deferred due to PHE (public health emergency) video restrictions       Wt Readings from Last 4 Encounters:   04/02/21 48.5 kg (107 lb)   03/12/21 48.5 kg (106 lb 14.4 oz)   02/19/21 49.4 kg (109 lb)   01/29/21 49.4 kg (109 lb)         Labs: n/a    Imaging: n/a    Impression/plan:   ER/NE +, HER-2 positive R upper/inner quadrant breast cancer. Clinically T2N0. Treated with neoadjuvant TCHP x 6 cycles and herceptin/perjeta alone with cycle 6. S/P bilateral mastecomies and sentinel LN bx on 11/11/20 with a pathologic CR.  -continued with adjuvant herceptin/perjeta on 11/23/20, plan to continue every 3 weeks for a total of 1 year of therapy.  We reviewed the schedule and in her postop cycle #14 which is the last cycle with fall on 8/27/2021.  -Continue on tamoxifen 20 mg daily  -tolerating well, better than when on neoadjuvant chemotherapy.  No significant side effects from any of the treatments.    -Will try to find more exact percentages for recurrence risk for her but pCR is an excellent prognostic marker for her. There's a 95% 4 year survival rate for those with HER2+ node negative pCR.   -Follow-up with Dr. Gerardo in June.     Cardiac monitoring while on HER2 therapy  -echo every 3 months, last done 3/12/21, WNL, due June 2021.     Diarrhea  -Minimal. Discussed eating yogurt since she is on heavy antibiotics.     Fatigue  -much better since stopping chemotherapy.     50 minutes spent on the date of the encounter doing chart review, review of outside records, review of test results, interpretation of tests, patient visit and documentation     Tahmina Roberson PA-C

## 2021-04-22 DIAGNOSIS — Z17.0 MALIGNANT NEOPLASM OF UPPER-INNER QUADRANT OF RIGHT BREAST IN FEMALE, ESTROGEN RECEPTOR POSITIVE (H): Primary | ICD-10-CM

## 2021-04-22 DIAGNOSIS — C50.211 MALIGNANT NEOPLASM OF UPPER-INNER QUADRANT OF RIGHT BREAST IN FEMALE, ESTROGEN RECEPTOR POSITIVE (H): Primary | ICD-10-CM

## 2021-04-22 RX ORDER — DIPHENHYDRAMINE HCL 25 MG
50 CAPSULE ORAL
Status: CANCELLED
Start: 2021-04-23

## 2021-04-22 RX ORDER — ALBUTEROL SULFATE 90 UG/1
1-2 AEROSOL, METERED RESPIRATORY (INHALATION)
Status: CANCELLED
Start: 2021-04-23

## 2021-04-22 RX ORDER — SODIUM CHLORIDE 9 MG/ML
1000 INJECTION, SOLUTION INTRAVENOUS CONTINUOUS PRN
Status: CANCELLED
Start: 2021-04-23

## 2021-04-22 RX ORDER — HEPARIN SODIUM,PORCINE 10 UNIT/ML
5 VIAL (ML) INTRAVENOUS
Status: CANCELLED | OUTPATIENT
Start: 2021-04-23

## 2021-04-22 RX ORDER — ACETAMINOPHEN 325 MG/1
650 TABLET ORAL
Status: CANCELLED
Start: 2021-04-23

## 2021-04-22 RX ORDER — EPINEPHRINE 1 MG/ML
0.3 INJECTION, SOLUTION INTRAMUSCULAR; SUBCUTANEOUS EVERY 5 MIN PRN
Status: CANCELLED | OUTPATIENT
Start: 2021-04-23

## 2021-04-22 RX ORDER — MEPERIDINE HYDROCHLORIDE 25 MG/ML
25 INJECTION INTRAMUSCULAR; INTRAVENOUS; SUBCUTANEOUS EVERY 30 MIN PRN
Status: CANCELLED | OUTPATIENT
Start: 2021-04-23

## 2021-04-22 RX ORDER — METHYLPREDNISOLONE SODIUM SUCCINATE 125 MG/2ML
125 INJECTION, POWDER, LYOPHILIZED, FOR SOLUTION INTRAMUSCULAR; INTRAVENOUS
Status: CANCELLED
Start: 2021-04-23

## 2021-04-22 RX ORDER — NALOXONE HYDROCHLORIDE 0.4 MG/ML
.1-.4 INJECTION, SOLUTION INTRAMUSCULAR; INTRAVENOUS; SUBCUTANEOUS
Status: CANCELLED | OUTPATIENT
Start: 2021-04-23

## 2021-04-22 RX ORDER — DIPHENHYDRAMINE HYDROCHLORIDE 50 MG/ML
50 INJECTION INTRAMUSCULAR; INTRAVENOUS
Status: CANCELLED
Start: 2021-04-23

## 2021-04-22 RX ORDER — HEPARIN SODIUM (PORCINE) LOCK FLUSH IV SOLN 100 UNIT/ML 100 UNIT/ML
5 SOLUTION INTRAVENOUS
Status: CANCELLED | OUTPATIENT
Start: 2021-04-23

## 2021-04-22 RX ORDER — LORAZEPAM 2 MG/ML
0.5 INJECTION INTRAMUSCULAR EVERY 4 HOURS PRN
Status: CANCELLED
Start: 2021-04-23

## 2021-04-22 RX ORDER — ALBUTEROL SULFATE 0.83 MG/ML
2.5 SOLUTION RESPIRATORY (INHALATION)
Status: CANCELLED | OUTPATIENT
Start: 2021-04-23

## 2021-04-23 ENCOUNTER — INFUSION THERAPY VISIT (OUTPATIENT)
Dept: ONCOLOGY | Facility: CLINIC | Age: 41
End: 2021-04-23
Attending: INTERNAL MEDICINE
Payer: COMMERCIAL

## 2021-04-23 VITALS
WEIGHT: 111.3 LBS | SYSTOLIC BLOOD PRESSURE: 106 MMHG | OXYGEN SATURATION: 98 % | HEART RATE: 83 BPM | RESPIRATION RATE: 18 BRPM | BODY MASS INDEX: 19.1 KG/M2 | TEMPERATURE: 98.3 F | DIASTOLIC BLOOD PRESSURE: 71 MMHG

## 2021-04-23 DIAGNOSIS — C50.211 MALIGNANT NEOPLASM OF UPPER-INNER QUADRANT OF RIGHT BREAST IN FEMALE, ESTROGEN RECEPTOR POSITIVE (H): Primary | ICD-10-CM

## 2021-04-23 DIAGNOSIS — Z17.0 MALIGNANT NEOPLASM OF UPPER-INNER QUADRANT OF RIGHT BREAST IN FEMALE, ESTROGEN RECEPTOR POSITIVE (H): Primary | ICD-10-CM

## 2021-04-23 PROCEDURE — 250N000011 HC RX IP 250 OP 636: Performed by: INTERNAL MEDICINE

## 2021-04-23 PROCEDURE — 96417 CHEMO IV INFUS EACH ADDL SEQ: CPT

## 2021-04-23 PROCEDURE — 96413 CHEMO IV INFUSION 1 HR: CPT

## 2021-04-23 PROCEDURE — 258N000003 HC RX IP 258 OP 636: Performed by: INTERNAL MEDICINE

## 2021-04-23 RX ORDER — HEPARIN SODIUM,PORCINE 10 UNIT/ML
5 VIAL (ML) INTRAVENOUS
Status: DISCONTINUED | OUTPATIENT
Start: 2021-04-23 | End: 2021-04-23 | Stop reason: HOSPADM

## 2021-04-23 RX ORDER — HEPARIN SODIUM (PORCINE) LOCK FLUSH IV SOLN 100 UNIT/ML 100 UNIT/ML
5 SOLUTION INTRAVENOUS
Status: DISCONTINUED | OUTPATIENT
Start: 2021-04-23 | End: 2021-04-23 | Stop reason: HOSPADM

## 2021-04-23 RX ADMIN — TRASTUZUMAB-ANNS 300 MG: 420 INJECTION, POWDER, LYOPHILIZED, FOR SOLUTION INTRAVENOUS at 10:36

## 2021-04-23 RX ADMIN — Medication 5 ML: at 11:08

## 2021-04-23 RX ADMIN — SODIUM CHLORIDE 250 ML: 9 INJECTION, SOLUTION INTRAVENOUS at 09:59

## 2021-04-23 RX ADMIN — PERTUZUMAB 420 MG: 30 INJECTION, SOLUTION, CONCENTRATE INTRAVENOUS at 10:00

## 2021-04-23 ASSESSMENT — PAIN SCALES - GENERAL: PAINLEVEL: NO PAIN (0)

## 2021-04-23 NOTE — PROGRESS NOTES
Infusion Nursing Note:  Leah Rangel presents for C8 Perjeta/Herceptin  Met with KEN Caba 4/21, no changes since    Note: pt feeling well today, no new issues or concerns to report.    Pain: denies    Treatment Conditions:  ECHO/MUGA completed 3/12/21  EF 55-60%.    Intravenous Access:  Implanted Port.    Post Infusion Assessment:  Patient tolerated infusion without incident.  Blood return noted pre and post infusion.  No evidence of extravasations.  Access discontinued per protocol.    Discharge Plan:   Patient declined prescription refills.  Discharge instructions reviewed with: Patient.  Patient and/or family verbalized understanding of discharge instructions and all questions answered.  AVS to patient via [employer].  Patient will return 5/14 for next appointment.   Patient discharged in stable condition accompanied by: self.    Marine Cedillo, RN, RN

## 2021-05-12 DIAGNOSIS — C50.211 MALIGNANT NEOPLASM OF UPPER-INNER QUADRANT OF RIGHT BREAST IN FEMALE, ESTROGEN RECEPTOR POSITIVE (H): Primary | ICD-10-CM

## 2021-05-12 DIAGNOSIS — Z17.0 MALIGNANT NEOPLASM OF UPPER-INNER QUADRANT OF RIGHT BREAST IN FEMALE, ESTROGEN RECEPTOR POSITIVE (H): Primary | ICD-10-CM

## 2021-05-12 RX ORDER — DIPHENHYDRAMINE HYDROCHLORIDE 50 MG/ML
50 INJECTION INTRAMUSCULAR; INTRAVENOUS
Status: CANCELLED
Start: 2021-05-14

## 2021-05-12 RX ORDER — HEPARIN SODIUM,PORCINE 10 UNIT/ML
5 VIAL (ML) INTRAVENOUS
Status: CANCELLED | OUTPATIENT
Start: 2021-05-14

## 2021-05-12 RX ORDER — DIPHENHYDRAMINE HCL 25 MG
50 CAPSULE ORAL
Status: CANCELLED
Start: 2021-05-14

## 2021-05-12 RX ORDER — ALBUTEROL SULFATE 0.83 MG/ML
2.5 SOLUTION RESPIRATORY (INHALATION)
Status: CANCELLED | OUTPATIENT
Start: 2021-05-14

## 2021-05-12 RX ORDER — NALOXONE HYDROCHLORIDE 0.4 MG/ML
.1-.4 INJECTION, SOLUTION INTRAMUSCULAR; INTRAVENOUS; SUBCUTANEOUS
Status: CANCELLED | OUTPATIENT
Start: 2021-05-14

## 2021-05-12 RX ORDER — SODIUM CHLORIDE 9 MG/ML
1000 INJECTION, SOLUTION INTRAVENOUS CONTINUOUS PRN
Status: CANCELLED
Start: 2021-05-14

## 2021-05-12 RX ORDER — HEPARIN SODIUM (PORCINE) LOCK FLUSH IV SOLN 100 UNIT/ML 100 UNIT/ML
5 SOLUTION INTRAVENOUS
Status: CANCELLED | OUTPATIENT
Start: 2021-05-14

## 2021-05-12 RX ORDER — EPINEPHRINE 1 MG/ML
0.3 INJECTION, SOLUTION INTRAMUSCULAR; SUBCUTANEOUS EVERY 5 MIN PRN
Status: CANCELLED | OUTPATIENT
Start: 2021-05-14

## 2021-05-12 RX ORDER — MEPERIDINE HYDROCHLORIDE 25 MG/ML
25 INJECTION INTRAMUSCULAR; INTRAVENOUS; SUBCUTANEOUS EVERY 30 MIN PRN
Status: CANCELLED | OUTPATIENT
Start: 2021-05-14

## 2021-05-12 RX ORDER — LORAZEPAM 2 MG/ML
0.5 INJECTION INTRAMUSCULAR EVERY 4 HOURS PRN
Status: CANCELLED
Start: 2021-05-14

## 2021-05-12 RX ORDER — ACETAMINOPHEN 325 MG/1
650 TABLET ORAL
Status: CANCELLED
Start: 2021-05-14

## 2021-05-12 RX ORDER — METHYLPREDNISOLONE SODIUM SUCCINATE 125 MG/2ML
125 INJECTION, POWDER, LYOPHILIZED, FOR SOLUTION INTRAMUSCULAR; INTRAVENOUS
Status: CANCELLED
Start: 2021-05-14

## 2021-05-12 RX ORDER — ALBUTEROL SULFATE 90 UG/1
1-2 AEROSOL, METERED RESPIRATORY (INHALATION)
Status: CANCELLED
Start: 2021-05-14

## 2021-05-14 ENCOUNTER — INFUSION THERAPY VISIT (OUTPATIENT)
Dept: ONCOLOGY | Facility: CLINIC | Age: 41
End: 2021-05-14
Attending: INTERNAL MEDICINE
Payer: COMMERCIAL

## 2021-05-14 VITALS
HEART RATE: 103 BPM | OXYGEN SATURATION: 96 % | SYSTOLIC BLOOD PRESSURE: 121 MMHG | RESPIRATION RATE: 16 BRPM | TEMPERATURE: 98.3 F | DIASTOLIC BLOOD PRESSURE: 79 MMHG | BODY MASS INDEX: 19.5 KG/M2 | WEIGHT: 113.6 LBS

## 2021-05-14 DIAGNOSIS — Z17.0 MALIGNANT NEOPLASM OF UPPER-INNER QUADRANT OF RIGHT BREAST IN FEMALE, ESTROGEN RECEPTOR POSITIVE (H): Primary | ICD-10-CM

## 2021-05-14 DIAGNOSIS — C50.211 MALIGNANT NEOPLASM OF UPPER-INNER QUADRANT OF RIGHT BREAST IN FEMALE, ESTROGEN RECEPTOR POSITIVE (H): Primary | ICD-10-CM

## 2021-05-14 PROCEDURE — 258N000003 HC RX IP 258 OP 636: Performed by: INTERNAL MEDICINE

## 2021-05-14 PROCEDURE — 250N000011 HC RX IP 250 OP 636: Performed by: INTERNAL MEDICINE

## 2021-05-14 PROCEDURE — 96413 CHEMO IV INFUSION 1 HR: CPT

## 2021-05-14 PROCEDURE — 96417 CHEMO IV INFUS EACH ADDL SEQ: CPT

## 2021-05-14 RX ORDER — HEPARIN SODIUM (PORCINE) LOCK FLUSH IV SOLN 100 UNIT/ML 100 UNIT/ML
5 SOLUTION INTRAVENOUS
Status: DISCONTINUED | OUTPATIENT
Start: 2021-05-14 | End: 2021-05-14 | Stop reason: HOSPADM

## 2021-05-14 RX ADMIN — SODIUM CHLORIDE 250 ML: 9 INJECTION, SOLUTION INTRAVENOUS at 10:09

## 2021-05-14 RX ADMIN — TRASTUZUMAB-ANNS 300 MG: 420 INJECTION, POWDER, LYOPHILIZED, FOR SOLUTION INTRAVENOUS at 10:43

## 2021-05-14 RX ADMIN — PERTUZUMAB 420 MG: 30 INJECTION, SOLUTION, CONCENTRATE INTRAVENOUS at 10:09

## 2021-05-14 RX ADMIN — Medication 5 ML: at 11:15

## 2021-05-14 ASSESSMENT — PAIN SCALES - GENERAL: PAINLEVEL: NO PAIN (0)

## 2021-05-14 NOTE — PROGRESS NOTES
"Infusion Nursing Note:  Leah Rangel presents today for Cycle 9 Day 1 Pertuxumab and Trastuzumab-genaro (KANJINTI).    Patient seen by provider today: No   present during visit today: Not Applicable.    Note: Patient states she is \"feeling good.\"  She offers no new concerns today.    Intravenous Access:  Implanted Port.    Treatment Conditions:  ECHO/MUGA completed 3/12/21  EF 55-60%.    Post Infusion Assessment:  Patient tolerated infusion without incident.  Blood return noted pre and post infusion.  Site patent and intact, free from redness, edema or discomfort.  No evidence of extravasations.  Access discontinued per protocol.     Discharge Plan:   Patient declined prescription refills.  Discharge instructions reviewed with: Patient.  Patient and/or family verbalized understanding of discharge instructions and all questions answered.  AVS to patient via [employer].  Patient will return 6/4/21 for next appointment.   Patient discharged in stable condition accompanied by: self.  Departure Mode: Ambulatory.  Face to Face time: 0.    TAN MAYA RN                      "

## 2021-06-01 DIAGNOSIS — Z17.0 MALIGNANT NEOPLASM OF UPPER-INNER QUADRANT OF RIGHT BREAST IN FEMALE, ESTROGEN RECEPTOR POSITIVE (H): Primary | ICD-10-CM

## 2021-06-01 DIAGNOSIS — C50.211 MALIGNANT NEOPLASM OF UPPER-INNER QUADRANT OF RIGHT BREAST IN FEMALE, ESTROGEN RECEPTOR POSITIVE (H): Primary | ICD-10-CM

## 2021-06-01 RX ORDER — LORAZEPAM 2 MG/ML
0.5 INJECTION INTRAMUSCULAR EVERY 4 HOURS PRN
Status: CANCELLED
Start: 2021-06-04

## 2021-06-01 RX ORDER — ALBUTEROL SULFATE 90 UG/1
1-2 AEROSOL, METERED RESPIRATORY (INHALATION)
Status: CANCELLED
Start: 2021-06-04

## 2021-06-01 RX ORDER — ACETAMINOPHEN 325 MG/1
650 TABLET ORAL
Status: CANCELLED
Start: 2021-06-04

## 2021-06-01 RX ORDER — NALOXONE HYDROCHLORIDE 0.4 MG/ML
.1-.4 INJECTION, SOLUTION INTRAMUSCULAR; INTRAVENOUS; SUBCUTANEOUS
Status: CANCELLED | OUTPATIENT
Start: 2021-06-04

## 2021-06-01 RX ORDER — DIPHENHYDRAMINE HCL 25 MG
50 CAPSULE ORAL
Status: CANCELLED
Start: 2021-06-04

## 2021-06-01 RX ORDER — HEPARIN SODIUM,PORCINE 10 UNIT/ML
5 VIAL (ML) INTRAVENOUS
Status: CANCELLED | OUTPATIENT
Start: 2021-06-04

## 2021-06-01 RX ORDER — HEPARIN SODIUM (PORCINE) LOCK FLUSH IV SOLN 100 UNIT/ML 100 UNIT/ML
5 SOLUTION INTRAVENOUS
Status: CANCELLED | OUTPATIENT
Start: 2021-06-04

## 2021-06-01 RX ORDER — SODIUM CHLORIDE 9 MG/ML
1000 INJECTION, SOLUTION INTRAVENOUS CONTINUOUS PRN
Status: CANCELLED
Start: 2021-06-04

## 2021-06-01 RX ORDER — DIPHENHYDRAMINE HYDROCHLORIDE 50 MG/ML
50 INJECTION INTRAMUSCULAR; INTRAVENOUS
Status: CANCELLED
Start: 2021-06-04

## 2021-06-01 RX ORDER — MEPERIDINE HYDROCHLORIDE 25 MG/ML
25 INJECTION INTRAMUSCULAR; INTRAVENOUS; SUBCUTANEOUS EVERY 30 MIN PRN
Status: CANCELLED | OUTPATIENT
Start: 2021-06-04

## 2021-06-01 RX ORDER — ALBUTEROL SULFATE 0.83 MG/ML
2.5 SOLUTION RESPIRATORY (INHALATION)
Status: CANCELLED | OUTPATIENT
Start: 2021-06-04

## 2021-06-01 RX ORDER — METHYLPREDNISOLONE SODIUM SUCCINATE 125 MG/2ML
125 INJECTION, POWDER, LYOPHILIZED, FOR SOLUTION INTRAMUSCULAR; INTRAVENOUS
Status: CANCELLED
Start: 2021-06-04

## 2021-06-01 RX ORDER — EPINEPHRINE 1 MG/ML
0.3 INJECTION, SOLUTION INTRAMUSCULAR; SUBCUTANEOUS EVERY 5 MIN PRN
Status: CANCELLED | OUTPATIENT
Start: 2021-06-04

## 2021-06-03 ENCOUNTER — ANCILLARY PROCEDURE (OUTPATIENT)
Dept: CARDIOLOGY | Facility: CLINIC | Age: 41
End: 2021-06-03
Attending: PHYSICIAN ASSISTANT
Payer: COMMERCIAL

## 2021-06-03 DIAGNOSIS — C50.211 MALIGNANT NEOPLASM OF UPPER-INNER QUADRANT OF RIGHT BREAST IN FEMALE, ESTROGEN RECEPTOR POSITIVE (H): ICD-10-CM

## 2021-06-03 DIAGNOSIS — Z51.11 ENCOUNTER FOR ANTINEOPLASTIC CHEMOTHERAPY: ICD-10-CM

## 2021-06-03 DIAGNOSIS — Z91.89 AT RISK FOR CARDIOMYOPATHY: ICD-10-CM

## 2021-06-03 DIAGNOSIS — Z17.0 MALIGNANT NEOPLASM OF UPPER-INNER QUADRANT OF RIGHT BREAST IN FEMALE, ESTROGEN RECEPTOR POSITIVE (H): ICD-10-CM

## 2021-06-03 PROCEDURE — 93306 TTE W/DOPPLER COMPLETE: CPT | Performed by: STUDENT IN AN ORGANIZED HEALTH CARE EDUCATION/TRAINING PROGRAM

## 2021-06-04 ENCOUNTER — INFUSION THERAPY VISIT (OUTPATIENT)
Dept: ONCOLOGY | Facility: CLINIC | Age: 41
End: 2021-06-04
Attending: INTERNAL MEDICINE
Payer: COMMERCIAL

## 2021-06-04 VITALS
DIASTOLIC BLOOD PRESSURE: 74 MMHG | HEART RATE: 94 BPM | SYSTOLIC BLOOD PRESSURE: 120 MMHG | TEMPERATURE: 98 F | OXYGEN SATURATION: 97 % | WEIGHT: 107.9 LBS | BODY MASS INDEX: 18.52 KG/M2 | RESPIRATION RATE: 16 BRPM

## 2021-06-04 DIAGNOSIS — Z17.0 MALIGNANT NEOPLASM OF UPPER-INNER QUADRANT OF RIGHT BREAST IN FEMALE, ESTROGEN RECEPTOR POSITIVE (H): Primary | ICD-10-CM

## 2021-06-04 DIAGNOSIS — C50.211 MALIGNANT NEOPLASM OF UPPER-INNER QUADRANT OF RIGHT BREAST IN FEMALE, ESTROGEN RECEPTOR POSITIVE (H): Primary | ICD-10-CM

## 2021-06-04 PROCEDURE — 258N000003 HC RX IP 258 OP 636: Performed by: INTERNAL MEDICINE

## 2021-06-04 PROCEDURE — 96413 CHEMO IV INFUSION 1 HR: CPT

## 2021-06-04 PROCEDURE — 250N000011 HC RX IP 250 OP 636: Performed by: INTERNAL MEDICINE

## 2021-06-04 PROCEDURE — 96417 CHEMO IV INFUS EACH ADDL SEQ: CPT

## 2021-06-04 RX ORDER — HEPARIN SODIUM (PORCINE) LOCK FLUSH IV SOLN 100 UNIT/ML 100 UNIT/ML
5 SOLUTION INTRAVENOUS
Status: DISCONTINUED | OUTPATIENT
Start: 2021-06-04 | End: 2021-06-04 | Stop reason: HOSPADM

## 2021-06-04 RX ADMIN — Medication 5 ML: at 11:36

## 2021-06-04 RX ADMIN — PERTUZUMAB 420 MG: 30 INJECTION, SOLUTION, CONCENTRATE INTRAVENOUS at 10:28

## 2021-06-04 RX ADMIN — SODIUM CHLORIDE 250 ML: 9 INJECTION, SOLUTION INTRAVENOUS at 10:28

## 2021-06-04 RX ADMIN — TRASTUZUMAB-ANNS 300 MG: 420 INJECTION, POWDER, LYOPHILIZED, FOR SOLUTION INTRAVENOUS at 11:01

## 2021-06-04 ASSESSMENT — PAIN SCALES - GENERAL: PAINLEVEL: NO PAIN (0)

## 2021-06-04 NOTE — PATIENT INSTRUCTIONS
Contact Numbers  [facility] Nurse Triage: [phone]    Please call the [facility] Triage line if you experience a temperature greater than or equal to 100.5, shaking chills, have uncontrolled nausea, vomiting and/or diarrhea, dizziness, shortness of breath, chest pain, bleeding, unexplained bruising, or if you have any other new/concerning symptoms, questions or concerns.      If you are having any concerning symptoms or wish to speak to a provider before your next infusion visit, please call your care coordinator or triage to notify them so we can adequately serve you.     If you need a refill on a prescription or other medication, please call triage before your infusion appointment.       June 2021 Sunday Monday Tuesday Wednesday Thursday Friday Saturday             1     2     3    ECHO COMPLETE  11:00 AM   (60 min.)   [id number]   [facility] 4    ONC INFUSION 6 HR (360 MIN)   9:30 AM   (360 min.)    ONC INFUSION NURSE   [facility] 5       6     7     8     9     10     11     12       13     14     15     16     17     18     19       20     21     22     23     24    VIDEO VISIT RETURN   8:15 AM   (30 min.)   Lucita Gerardo MD   [facility] 25    ONC INFUSION 6 HR (360 MIN)   9:30 AM   (360 min.)    ONC INFUSION NURSE   [facility] 26       27     28     29     30 July 2021 Sunday Monday Tuesday Wednesday Thursday Friday Saturday                       1     2     3       4     5     6     7     8     9     10       11     12     13     14     15     16    ONC INFUSION 6 HR (360 MIN)   9:30 AM   (360 min.)    ONC INFUSION NURSE   [facility] 17       18     19     20     21     22     23     24       25     26     27     28     29     30     31                     Lab Results:  No results found for this or any  previous visit (from the past 12 hour(s)).

## 2021-06-04 NOTE — PROGRESS NOTES
Infusion Nursing Note:  Leah Rangel presents today for D1 C10 Aditya Read..    Patient seen by provider today: No    Intravenous Access:  Implanted Port.    Treatment Conditions:  ECHO/MUGA completed 6/3/21  EF 55-60%.    Note: Patient reports feeling well. Denied any new issues or concerns. Reports chronic issues are at baseline and is managing at home without issue.    Post Infusion Assessment:  Patient tolerated infusion without incident.  Blood return noted pre and post infusion.  Site patent and intact, free from redness, edema or discomfort.  No evidence of extravasations.  Access discontinued per protocol.    Discharge Plan:   Patient declined prescription refills.  Discharge instructions reviewed with: Patient.  Patient and/or family verbalized understanding of discharge instructions and all questions answered.  AVS to patient via [employer].  Patient will return 6/24/21-MD and 6/25/21-Infusion for next appointment.   Patient discharged in stable condition accompanied by: self.  Departure Mode: Ambulatory.    Kathia Whitehead RN

## 2021-06-24 ENCOUNTER — VIRTUAL VISIT (OUTPATIENT)
Dept: ONCOLOGY | Facility: CLINIC | Age: 41
End: 2021-06-24
Attending: INTERNAL MEDICINE
Payer: COMMERCIAL

## 2021-06-24 DIAGNOSIS — R91.8 PULMONARY NODULES: ICD-10-CM

## 2021-06-24 DIAGNOSIS — L65.9 HAIR LOSS: ICD-10-CM

## 2021-06-24 DIAGNOSIS — Z17.0 MALIGNANT NEOPLASM OF UPPER-INNER QUADRANT OF RIGHT BREAST IN FEMALE, ESTROGEN RECEPTOR POSITIVE (H): Primary | ICD-10-CM

## 2021-06-24 DIAGNOSIS — C50.211 MALIGNANT NEOPLASM OF UPPER-INNER QUADRANT OF RIGHT BREAST IN FEMALE, ESTROGEN RECEPTOR POSITIVE (H): Primary | ICD-10-CM

## 2021-06-24 PROCEDURE — 99214 OFFICE O/P EST MOD 30 MIN: CPT | Mod: 95 | Performed by: INTERNAL MEDICINE

## 2021-06-24 PROCEDURE — 999N001193 HC VIDEO/TELEPHONE VISIT; NO CHARGE

## 2021-06-24 RX ORDER — ALBUTEROL SULFATE 90 UG/1
1-2 AEROSOL, METERED RESPIRATORY (INHALATION)
Status: CANCELLED
Start: 2021-06-25

## 2021-06-24 RX ORDER — DIPHENHYDRAMINE HCL 25 MG
50 CAPSULE ORAL
Status: CANCELLED
Start: 2021-06-25

## 2021-06-24 RX ORDER — LORAZEPAM 2 MG/ML
0.5 INJECTION INTRAMUSCULAR EVERY 4 HOURS PRN
Status: CANCELLED
Start: 2021-06-25

## 2021-06-24 RX ORDER — METHYLPREDNISOLONE SODIUM SUCCINATE 125 MG/2ML
125 INJECTION, POWDER, LYOPHILIZED, FOR SOLUTION INTRAMUSCULAR; INTRAVENOUS
Status: CANCELLED
Start: 2021-06-25

## 2021-06-24 RX ORDER — ALBUTEROL SULFATE 0.83 MG/ML
2.5 SOLUTION RESPIRATORY (INHALATION)
Status: CANCELLED | OUTPATIENT
Start: 2021-06-25

## 2021-06-24 RX ORDER — ACETAMINOPHEN 325 MG/1
650 TABLET ORAL
Status: CANCELLED
Start: 2021-06-25

## 2021-06-24 RX ORDER — HEPARIN SODIUM (PORCINE) LOCK FLUSH IV SOLN 100 UNIT/ML 100 UNIT/ML
5 SOLUTION INTRAVENOUS
Status: CANCELLED | OUTPATIENT
Start: 2021-06-25

## 2021-06-24 RX ORDER — BIMATOPROST 3 UG/ML
1 SOLUTION TOPICAL AT BEDTIME
Qty: 5 ML | Refills: 1 | Status: SHIPPED | OUTPATIENT
Start: 2021-06-24 | End: 2022-02-03

## 2021-06-24 RX ORDER — EPINEPHRINE 1 MG/ML
0.3 INJECTION, SOLUTION INTRAMUSCULAR; SUBCUTANEOUS EVERY 5 MIN PRN
Status: CANCELLED | OUTPATIENT
Start: 2021-06-25

## 2021-06-24 RX ORDER — NALOXONE HYDROCHLORIDE 0.4 MG/ML
.1-.4 INJECTION, SOLUTION INTRAMUSCULAR; INTRAVENOUS; SUBCUTANEOUS
Status: CANCELLED | OUTPATIENT
Start: 2021-06-25

## 2021-06-24 RX ORDER — MEPERIDINE HYDROCHLORIDE 25 MG/ML
25 INJECTION INTRAMUSCULAR; INTRAVENOUS; SUBCUTANEOUS EVERY 30 MIN PRN
Status: CANCELLED | OUTPATIENT
Start: 2021-06-25

## 2021-06-24 RX ORDER — SODIUM CHLORIDE 9 MG/ML
1000 INJECTION, SOLUTION INTRAVENOUS CONTINUOUS PRN
Status: CANCELLED
Start: 2021-06-25

## 2021-06-24 RX ORDER — HEPARIN SODIUM,PORCINE 10 UNIT/ML
5 VIAL (ML) INTRAVENOUS
Status: CANCELLED | OUTPATIENT
Start: 2021-06-25

## 2021-06-24 RX ORDER — DIPHENHYDRAMINE HYDROCHLORIDE 50 MG/ML
50 INJECTION INTRAMUSCULAR; INTRAVENOUS
Status: CANCELLED
Start: 2021-06-25

## 2021-06-24 NOTE — PROGRESS NOTES
Leah is a 41 year old who is being evaluated via a billable video visit.      How would you like to obtain your AVS? MyChart  If the video visit is dropped, the invitation should be resent by: Text to cell phone: [phone]  Will anyone else be joining your video visit? LIZ Marie      Video Start Time: 830  Video-Visit Details    Type of service:  Video Visit    Video End Time:900    Originating Location (pt. Location): Home    Distant Location (provider location):  [facility]     Platform used for Video Visit: etrigg     Reason for Visit: follow-up invasive ductal carcinoma of the R breast, grade 3, ER positive, NV positive, HER-2 positive 3+ by IHC    Oncology HPI:   Leah Rangel is a  41 year old, premenopausal female, diagnosed with R breast cancer, T2N0 right breast cancer.      Leah reports that she has a history of bilateral implants.  She noticed a palpable mass involving her right breast in the upper inner quadrant in early 06/2020.  At that time, she went in for an evaluation with a mammogram and ultrasound.  Mammogram on 06/11/2020 revealed a 7 mm mass at the 1 o'clock position approximately 5 cm from the nipple in the upper inner quadrant of the right breast.  There was a second area measuring 6 x 5 x 4 mm at the 1 o'clock position 6 cm from the nipple.  In the left breast, there was an area of microcalcifications.  She was referred for a right breast biopsy.  Both areas at the 1 o'clock position were biopsied.  No lymph nodes were appreciated.  Both of these biopsies revealed an invasive ductal carcinoma, grade 3, ER positive, NV positive, HER-2 positive, 3+ by IHC.  She  was considered to have a T1b multifocal N1 breast cancer.  On further review, it appeared to be T2Nx. She was subsequently referred for a breast MRI.  The BMRI done on 7/10/20 showed a 4.5 x 2.4 x 3 cm mass in the R upper, inner breast. She underwent a PET/CT 7/16 and  started neoadjuvant treatment with Docetaxel/Carboplatin/Trastuzumab/Pertuzumab (TCHP) 7/17/20.  Lymph nodes appeared normal on MRI and PET.     She completed five cycles of TCHP 10/9/20, had a sixth cycle with just herceptin and perjeta on 10/30/20.  She had significant problems with fatigue, nausea, weight loss and diarrhea.  As a result, her treatment was stopped one cycle early.     She underwent bilateral mastectomies and sentinel lymph node biopsy on 11/11/20 with Dr Marquez Vargas and Dr Piotr Ocoha. She had a pathologic CR. She was continued on adjuvant trastuzumab/pertuzumab on 11/23/20 with a plan for one year of adjuvant therapy. She was also started on adjuvant tamoxifen.       Interval history: Leah is feeling well today.     Tamoxifen and HP are going well. She initially had severe hot flashes with sommer but these resolved after about 6 weeks. Has some fatigue after infusions but that could be mainly from being an excursion out of the house for a few hours. She has ongoing loose stools with HP infusions. She has about 3 loose stools per day, they do not bother her and she is able to manage well, does not want to use any anti-diarrheals. Her eye lashes fell off again and is wondering she she can have latisse prescription.     She has a few questions about surveillance plan, labs and imaging.      ROS: 10 point ROS neg other than the symptoms noted above in the HPI.      Current Outpatient Medications   Medication Sig Dispense Refill     tamoxifen (NOLVADEX) 20 MG tablet Take 1 tablet (20 mg) by mouth daily 90 tablet 3     diazepam (VALIUM) 5 MG tablet Take one tablet one hour prior to scan. May repeat dose one time. (Patient not taking: Reported on 4/21/2021) 2 tablet 0     diphenhydrAMINE-acetaminophen (TYLENOL PM)  MG tablet Take 1 tablet by mouth nightly as needed for sleep       lidocaine (XYLOCAINE) 2 % external gel Apply topically 3 times daily as needed for moderate pain (Patient not  taking: Reported on 6/4/2021) 30 mL 1     lidocaine-prilocaine (EMLA) 2.5-2.5 % external cream Apply topically as needed for moderate pain Apply to PORT site 45 minutes prior to procedure.  Cover with a non-absorbent dressing. (Patient not taking: Reported on 6/4/2021) 30 g 4     LORazepam (ATIVAN) 0.5 MG tablet Take 1 tablet (0.5 mg) by mouth every 4 hours as needed (Anxiety, Nausea/Vomiting or Sleep) (Patient not taking: Reported on 6/4/2021) 30 tablet 3     LORazepam (ATIVAN) 0.5 MG tablet        magic mouthwash suspension, diphenhydrAMINE, lidocaine, aluminum-magnesium & simethicone, (FIRST-MOUTHWASH BLM) compounding kit Swish and swallow 5-10 mLs in mouth every 6 hours as needed for mouth sores (Patient not taking: Reported on 6/4/2021) 237 mL 1     methylphenidate (RITALIN) 5 MG tablet Take 0.5-1 tablets (2.5-5 mg) by mouth 2 times daily as needed (fatigue) Take no later than 2pm (Patient not taking: Reported on 6/4/2021) 60 tablet 0     morphine (MSIR) 15 MG IR tablet Take 0.5-1 tablets (7.5-15 mg) by mouth every 4 hours as needed for severe pain (Patient not taking: Reported on 6/4/2021) 45 tablet 0     order for DME Equipment: Wig  Diagnosis: cranial alopecia (Patient not taking: Reported on 6/4/2021) 1 each 0     prochlorperazine (COMPAZINE) 10 MG tablet Take 1 tablet (10 mg) by mouth every 6 hours as needed (Nausea/Vomiting) (Patient not taking: Reported on 6/4/2021) 30 tablet 3     Vaginal Lubricant (REPLENS) GEL Apply vaginally as needed (Patient not taking: Reported on 6/4/2021) 35 g 1          Allergies   Allergen Reactions     Codeine Other (See Comments)     Got hyper         Video physical exam  General: Patient appears well in no acute distress.   Skin: No visualized rash or lesions on visualized skin. Scalp shows short hair.  Eyes: EOMI, no erythema, sclera icterus or discharge noted  Resp: Appears to be breathing comfortably without accessory muscle usage, speaking in full sentences, no  cough  MSK: Appears to have normal range of motion based on visualized movements  Neurologic: No apparent tremors, facial movements symmetric  Psych: affect pleasant, engaged, alert and oriented    The rest of a comprehensive physical examination is deferred due to PHE (public health emergency) video restrictions       Wt Readings from Last 4 Encounters:   06/04/21 48.9 kg (107 lb 14.4 oz)   05/14/21 51.5 kg (113 lb 9.6 oz)   04/23/21 50.5 kg (111 lb 4.8 oz)   04/02/21 48.5 kg (107 lb)         Labs: n/a    Imaging: last echo 6/3/21 normal EF.    Impression/plan:   ER/NH +, HER-2 positive R upper/inner quadrant breast cancer. Clinically T2N0. Treated with neoadjuvant TCHP x 6 cycles and herceptin/perjeta alone with cycle 6. S/P bilateral mastecomies and sentinel LN bx on 11/11/20 with a pathologic CR.  -continued with adjuvant herceptin/perjeta on 11/23/20, plan to continue every 3 weeks for a total of 1 year of therapy.  We reviewed the schedule and in her postop cycle #14 which is the last cycle with fall on 8/27/2021.  -Continue on tamoxifen 20 mg daily  -tolerating well, better than when on neoadjuvant chemotherapy.    Will prescribe latisse for eye lashes. Diarrhea not needing anti-diarrheals.   Will obtain CT CAP in late august as a new baseline and to follow up on 3 mm pulmonary nodule seen on prior CT chest.   -Follow-up with YANY after CT chest in last august.  May also have port removed soon after    Cardiac monitoring while on HER2 therapy  -echo every 3 months, last done 6/3/21 WNL.     Diarrhea  -Minimal. Discussed eating yogurt since she is on heavy antibiotics.     Fatigue  -much better since stopping chemotherapy.     Patient seen and care plan discussed with Dino Bazan  Heme Onc Fellow.            SUBJECTIVE:  The patient seen and evaluated with me by Dr. Eagle.  I reviewed the above to reflect my evaluation.  Overall, Leah is doing very well on her adjuvant Herceptin and Perjeta with  minor symptoms of diarrhea and fatigue.  She is thrilled her hair is growing in.  She has had difficulty with hair loss involving her eyelashes and wishes to try a prescription of Latisse.    She is excited her infusions go through the summer and then stop.  She would like her port removed in September.    ASSESSMENT AND PLAN:  We discussed the following plan with her.     1. Continue Herceptin and Perjeta through August receiving 14 cycles of adjuvant treatment.     2. Port removal in September.     3. Chest CT in August with an YANY visit with her last infusion.     4. A visit with me 3 months following.    It was a pleasure to see Leah today.  We will also send out a prescription for Latisse.

## 2021-06-24 NOTE — LETTER
6/24/2021         RE: Leah Rangel  [address] [place] [address]        Dear Colleague,    Thank you for referring your patient, Leah Rangel, to the [facility]. Please see a copy of my visit note below.    Leah is a 41 year old who is being evaluated via a billable video visit.      How would you like to obtain your AVS? MyChart  If the video visit is dropped, the invitation should be resent by: Text to cell phone: [phone]  Will anyone else be joining your video visit? No       LIZ Kelley      Video Start Time: 830  Video-Visit Details    Type of service:  Video Visit    Video End Time:900    Originating Location (pt. Location): Home    Distant Location (provider location):  [facility]     Platform used for Video Visit: Doutor Recomenda     Reason for Visit: follow-up invasive ductal carcinoma of the R breast, grade 3, ER positive, CA positive, HER-2 positive 3+ by IHC    Oncology HPI:   Leah Rangel is a  41 year old, premenopausal female, diagnosed with R breast cancer, T2N0 right breast cancer.      Leah reports that she has a history of bilateral implants.  She noticed a palpable mass involving her right breast in the upper inner quadrant in early 06/2020.  At that time, she went in for an evaluation with a mammogram and ultrasound.  Mammogram on 06/11/2020 revealed a 7 mm mass at the 1 o'clock position approximately 5 cm from the nipple in the upper inner quadrant of the right breast.  There was a second area measuring 6 x 5 x 4 mm at the 1 o'clock position 6 cm from the nipple.  In the left breast, there was an area of microcalcifications.  She was referred for a right breast biopsy.  Both areas at the 1 o'clock position were biopsied.  No lymph nodes were appreciated.  Both of these biopsies revealed an invasive ductal carcinoma, grade 3, ER positive, CA positive, HER-2 positive, 3+ by IHC.  She  was considered to have a  T1b multifocal N1 breast cancer.  On further review, it appeared to be T2Nx. She was subsequently referred for a breast MRI.  The BMRI done on 7/10/20 showed a 4.5 x 2.4 x 3 cm mass in the R upper, inner breast. She underwent a PET/CT 7/16 and started neoadjuvant treatment with Docetaxel/Carboplatin/Trastuzumab/Pertuzumab (TCHP) 7/17/20.  Lymph nodes appeared normal on MRI and PET.     She completed five cycles of TCHP 10/9/20, had a sixth cycle with just herceptin and perjeta on 10/30/20.  She had significant problems with fatigue, nausea, weight loss and diarrhea.  As a result, her treatment was stopped one cycle early.     She underwent bilateral mastectomies and sentinel lymph node biopsy on 11/11/20 with Dr Marquez Vargas and Dr Piotr Ochoa. She had a pathologic CR. She was continued on adjuvant trastuzumab/pertuzumab on 11/23/20 with a plan for one year of adjuvant therapy. She was also started on adjuvant tamoxifen.       Interval history: Leah is feeling well today.     Tamoxifen and HP are going well. She initially had severe hot flashes with sommer but these resolved after about 6 weeks. Has some fatigue after infusions but that could be mainly from being an excursion out of the house for a few hours. She has ongoing loose stools with HP infusions. She has about 3 loose stools per day, they do not bother her and she is able to manage well, does not want to use any anti-diarrheals. Her eye lashes fell off again and is wondering she she can have latisse prescription.     She has a few questions about surveillance plan, labs and imaging.      ROS: 10 point ROS neg other than the symptoms noted above in the HPI.      Current Outpatient Medications   Medication Sig Dispense Refill     tamoxifen (NOLVADEX) 20 MG tablet Take 1 tablet (20 mg) by mouth daily 90 tablet 3     diazepam (VALIUM) 5 MG tablet Take one tablet one hour prior to scan. May repeat dose one time. (Patient not taking: Reported on 4/21/2021) 2  tablet 0     diphenhydrAMINE-acetaminophen (TYLENOL PM)  MG tablet Take 1 tablet by mouth nightly as needed for sleep       lidocaine (XYLOCAINE) 2 % external gel Apply topically 3 times daily as needed for moderate pain (Patient not taking: Reported on 6/4/2021) 30 mL 1     lidocaine-prilocaine (EMLA) 2.5-2.5 % external cream Apply topically as needed for moderate pain Apply to PORT site 45 minutes prior to procedure.  Cover with a non-absorbent dressing. (Patient not taking: Reported on 6/4/2021) 30 g 4     LORazepam (ATIVAN) 0.5 MG tablet Take 1 tablet (0.5 mg) by mouth every 4 hours as needed (Anxiety, Nausea/Vomiting or Sleep) (Patient not taking: Reported on 6/4/2021) 30 tablet 3     LORazepam (ATIVAN) 0.5 MG tablet        magic mouthwash suspension, diphenhydrAMINE, lidocaine, aluminum-magnesium & simethicone, (FIRST-MOUTHWASH BLM) compounding kit Swish and swallow 5-10 mLs in mouth every 6 hours as needed for mouth sores (Patient not taking: Reported on 6/4/2021) 237 mL 1     methylphenidate (RITALIN) 5 MG tablet Take 0.5-1 tablets (2.5-5 mg) by mouth 2 times daily as needed (fatigue) Take no later than 2pm (Patient not taking: Reported on 6/4/2021) 60 tablet 0     morphine (MSIR) 15 MG IR tablet Take 0.5-1 tablets (7.5-15 mg) by mouth every 4 hours as needed for severe pain (Patient not taking: Reported on 6/4/2021) 45 tablet 0     order for DME Equipment: Wig  Diagnosis: cranial alopecia (Patient not taking: Reported on 6/4/2021) 1 each 0     prochlorperazine (COMPAZINE) 10 MG tablet Take 1 tablet (10 mg) by mouth every 6 hours as needed (Nausea/Vomiting) (Patient not taking: Reported on 6/4/2021) 30 tablet 3     Vaginal Lubricant (REPLENS) GEL Apply vaginally as needed (Patient not taking: Reported on 6/4/2021) 35 g 1          Allergies   Allergen Reactions     Codeine Other (See Comments)     Got hyper         Video physical exam  General: Patient appears well in no acute distress.   Skin: No  visualized rash or lesions on visualized skin. Scalp shows short hair.  Eyes: EOMI, no erythema, sclera icterus or discharge noted  Resp: Appears to be breathing comfortably without accessory muscle usage, speaking in full sentences, no cough  MSK: Appears to have normal range of motion based on visualized movements  Neurologic: No apparent tremors, facial movements symmetric  Psych: affect pleasant, engaged, alert and oriented    The rest of a comprehensive physical examination is deferred due to PHE (public health emergency) video restrictions       Wt Readings from Last 4 Encounters:   06/04/21 48.9 kg (107 lb 14.4 oz)   05/14/21 51.5 kg (113 lb 9.6 oz)   04/23/21 50.5 kg (111 lb 4.8 oz)   04/02/21 48.5 kg (107 lb)         Labs: n/a    Imaging: last echo 6/3/21 normal EF.    Impression/plan:   ER/MT +, HER-2 positive R upper/inner quadrant breast cancer. Clinically T2N0. Treated with neoadjuvant TCHP x 6 cycles and herceptin/perjeta alone with cycle 6. S/P bilateral mastecomies and sentinel LN bx on 11/11/20 with a pathologic CR.  -continued with adjuvant herceptin/perjeta on 11/23/20, plan to continue every 3 weeks for a total of 1 year of therapy.  We reviewed the schedule and in her postop cycle #14 which is the last cycle with fall on 8/27/2021.  -Continue on tamoxifen 20 mg daily  -tolerating well, better than when on neoadjuvant chemotherapy.    Will prescribe latisse for eye lashes. Diarrhea not needing anti-diarrheals.   Will obtain CT CAP in late august as a new baseline and to follow up on 3 mm pulmonary nodule seen on prior CT chest.   -Follow-up with YANY after CT chest in last august.  May also have port removed soon after    Cardiac monitoring while on HER2 therapy  -echo every 3 months, last done 6/3/21 WNL.     Diarrhea  -Minimal. Discussed eating yogurt since she is on heavy antibiotics.     Fatigue  -much better since stopping chemotherapy.     Patient seen and care plan discussed with   Dino Gerardo  Heme Onc Fellow.      SUBJECTIVE:  The patient seen and evaluated with me by Dr. Eagle.  I reviewed the above to reflect my evaluation.  Overall, Leah is doing very well on her adjuvant Herceptin and Perjeta with minor symptoms of diarrhea and fatigue.  She is thrilled her hair is growing in.  She has had difficulty with hair loss involving her eyelashes and wishes to try a prescription of Latisse.    She is excited her infusions go through the summer and then stop.  She would like her port removed in September.    ASSESSMENT AND PLAN:  We discussed the following plan with her.     1. Continue Herceptin and Perjeta through August receiving 14 cycles of adjuvant treatment.     2. Port removal in September.     3. Chest CT in August with an YANY visit with her last infusion.     4. A visit with me 3 months following.    It was a pleasure to see Leah today.  We will also send out a prescription for Latisse.      Again, thank you for allowing me to participate in the care of your patient.        Sincerely,        Lucita Gerardo MD

## 2021-06-25 ENCOUNTER — INFUSION THERAPY VISIT (OUTPATIENT)
Dept: ONCOLOGY | Facility: CLINIC | Age: 41
End: 2021-06-25
Attending: INTERNAL MEDICINE
Payer: COMMERCIAL

## 2021-06-25 VITALS
SYSTOLIC BLOOD PRESSURE: 120 MMHG | WEIGHT: 107.8 LBS | BODY MASS INDEX: 18.5 KG/M2 | HEART RATE: 79 BPM | OXYGEN SATURATION: 99 % | DIASTOLIC BLOOD PRESSURE: 80 MMHG | TEMPERATURE: 98.4 F | RESPIRATION RATE: 18 BRPM

## 2021-06-25 DIAGNOSIS — C50.211 MALIGNANT NEOPLASM OF UPPER-INNER QUADRANT OF RIGHT BREAST IN FEMALE, ESTROGEN RECEPTOR POSITIVE (H): Primary | ICD-10-CM

## 2021-06-25 DIAGNOSIS — Z17.0 MALIGNANT NEOPLASM OF UPPER-INNER QUADRANT OF RIGHT BREAST IN FEMALE, ESTROGEN RECEPTOR POSITIVE (H): Primary | ICD-10-CM

## 2021-06-25 PROCEDURE — 96417 CHEMO IV INFUS EACH ADDL SEQ: CPT

## 2021-06-25 PROCEDURE — 250N000011 HC RX IP 250 OP 636: Performed by: INTERNAL MEDICINE

## 2021-06-25 PROCEDURE — 96413 CHEMO IV INFUSION 1 HR: CPT

## 2021-06-25 PROCEDURE — 258N000003 HC RX IP 258 OP 636: Performed by: INTERNAL MEDICINE

## 2021-06-25 RX ORDER — HEPARIN SODIUM,PORCINE 10 UNIT/ML
5 VIAL (ML) INTRAVENOUS
Status: DISCONTINUED | OUTPATIENT
Start: 2021-06-25 | End: 2021-06-25 | Stop reason: HOSPADM

## 2021-06-25 RX ORDER — HEPARIN SODIUM (PORCINE) LOCK FLUSH IV SOLN 100 UNIT/ML 100 UNIT/ML
5 SOLUTION INTRAVENOUS
Status: DISCONTINUED | OUTPATIENT
Start: 2021-06-25 | End: 2021-06-25 | Stop reason: HOSPADM

## 2021-06-25 RX ADMIN — PERTUZUMAB 420 MG: 30 INJECTION, SOLUTION, CONCENTRATE INTRAVENOUS at 10:12

## 2021-06-25 RX ADMIN — SODIUM CHLORIDE 250 ML: 9 INJECTION, SOLUTION INTRAVENOUS at 10:06

## 2021-06-25 RX ADMIN — Medication 5 ML: at 11:20

## 2021-06-25 RX ADMIN — TRASTUZUMAB-ANNS 300 MG: 420 INJECTION, POWDER, LYOPHILIZED, FOR SOLUTION INTRAVENOUS at 10:44

## 2021-06-25 ASSESSMENT — PAIN SCALES - GENERAL: PAINLEVEL: NO PAIN (0)

## 2021-06-25 NOTE — PROGRESS NOTES
Infusion Nursing Note:  Leah Rangel presents today for Cycle 11 Day 1 Hayley Read.    Patient seen by provider today: No   present during visit today: Not Applicable.    Note: Evaluated by Dr. Gerardo yesterday.  Reports no changes over night.      Intravenous Access:  Implanted Port.    Treatment Conditions:  ECHO/MUGA completed 6/3/21  EF 55-60%.      Post Infusion Assessment:  Patient tolerated infusion without incident.  Blood return noted pre and post infusion.  Site patent and intact, free from redness, edema or discomfort.  No evidence of extravasations.  Access discontinued per protocol.       Discharge Plan:   Patient declined prescription refills.  AVS to patient via Digital VaultHART.  Patient will return 7/16/21 for next appointment.   Patient discharged in stable condition accompanied by: self.  Departure Mode: Ambulatory.  Face to Face time: 0.      Aniya Sandoval RN

## 2021-06-30 DIAGNOSIS — Z11.59 ENCOUNTER FOR SCREENING FOR OTHER VIRAL DISEASES: ICD-10-CM

## 2021-07-16 ENCOUNTER — INFUSION THERAPY VISIT (OUTPATIENT)
Dept: ONCOLOGY | Facility: CLINIC | Age: 41
End: 2021-07-16
Attending: INTERNAL MEDICINE
Payer: COMMERCIAL

## 2021-07-16 VITALS
WEIGHT: 106.4 LBS | BODY MASS INDEX: 18.26 KG/M2 | DIASTOLIC BLOOD PRESSURE: 77 MMHG | RESPIRATION RATE: 16 BRPM | HEART RATE: 101 BPM | TEMPERATURE: 98.6 F | OXYGEN SATURATION: 95 % | SYSTOLIC BLOOD PRESSURE: 117 MMHG

## 2021-07-16 DIAGNOSIS — Z17.0 MALIGNANT NEOPLASM OF UPPER-INNER QUADRANT OF RIGHT BREAST IN FEMALE, ESTROGEN RECEPTOR POSITIVE (H): Primary | ICD-10-CM

## 2021-07-16 DIAGNOSIS — C50.211 MALIGNANT NEOPLASM OF UPPER-INNER QUADRANT OF RIGHT BREAST IN FEMALE, ESTROGEN RECEPTOR POSITIVE (H): Primary | ICD-10-CM

## 2021-07-16 PROCEDURE — 96417 CHEMO IV INFUS EACH ADDL SEQ: CPT

## 2021-07-16 PROCEDURE — 250N000011 HC RX IP 250 OP 636: Performed by: INTERNAL MEDICINE

## 2021-07-16 PROCEDURE — 258N000003 HC RX IP 258 OP 636: Performed by: INTERNAL MEDICINE

## 2021-07-16 PROCEDURE — 96413 CHEMO IV INFUSION 1 HR: CPT

## 2021-07-16 RX ORDER — HEPARIN SODIUM,PORCINE 10 UNIT/ML
5 VIAL (ML) INTRAVENOUS
Status: CANCELLED | OUTPATIENT
Start: 2021-08-27

## 2021-07-16 RX ORDER — SODIUM CHLORIDE 9 MG/ML
1000 INJECTION, SOLUTION INTRAVENOUS CONTINUOUS PRN
Status: CANCELLED
Start: 2021-08-06

## 2021-07-16 RX ORDER — ACETAMINOPHEN 325 MG/1
650 TABLET ORAL
Status: CANCELLED
Start: 2021-08-27

## 2021-07-16 RX ORDER — METHYLPREDNISOLONE SODIUM SUCCINATE 125 MG/2ML
125 INJECTION, POWDER, LYOPHILIZED, FOR SOLUTION INTRAMUSCULAR; INTRAVENOUS
Status: CANCELLED
Start: 2021-08-06

## 2021-07-16 RX ORDER — ACETAMINOPHEN 325 MG/1
650 TABLET ORAL
Status: CANCELLED
Start: 2021-07-16

## 2021-07-16 RX ORDER — SODIUM CHLORIDE 9 MG/ML
1000 INJECTION, SOLUTION INTRAVENOUS CONTINUOUS PRN
Status: CANCELLED
Start: 2021-08-27

## 2021-07-16 RX ORDER — EPINEPHRINE 1 MG/ML
0.3 INJECTION, SOLUTION INTRAMUSCULAR; SUBCUTANEOUS EVERY 5 MIN PRN
Status: CANCELLED | OUTPATIENT
Start: 2021-08-06

## 2021-07-16 RX ORDER — DIPHENHYDRAMINE HYDROCHLORIDE 50 MG/ML
50 INJECTION INTRAMUSCULAR; INTRAVENOUS
Status: CANCELLED
Start: 2021-07-16

## 2021-07-16 RX ORDER — MEPERIDINE HYDROCHLORIDE 25 MG/ML
25 INJECTION INTRAMUSCULAR; INTRAVENOUS; SUBCUTANEOUS EVERY 30 MIN PRN
Status: CANCELLED | OUTPATIENT
Start: 2021-07-16

## 2021-07-16 RX ORDER — DIPHENHYDRAMINE HCL 25 MG
50 CAPSULE ORAL
Status: CANCELLED
Start: 2021-08-27

## 2021-07-16 RX ORDER — EPINEPHRINE 1 MG/ML
0.3 INJECTION, SOLUTION INTRAMUSCULAR; SUBCUTANEOUS EVERY 5 MIN PRN
Status: CANCELLED | OUTPATIENT
Start: 2021-08-27

## 2021-07-16 RX ORDER — HEPARIN SODIUM,PORCINE 10 UNIT/ML
5 VIAL (ML) INTRAVENOUS
Status: CANCELLED | OUTPATIENT
Start: 2021-07-16

## 2021-07-16 RX ORDER — HEPARIN SODIUM (PORCINE) LOCK FLUSH IV SOLN 100 UNIT/ML 100 UNIT/ML
5 SOLUTION INTRAVENOUS
Status: CANCELLED | OUTPATIENT
Start: 2021-07-16

## 2021-07-16 RX ORDER — NALOXONE HYDROCHLORIDE 0.4 MG/ML
.1-.4 INJECTION, SOLUTION INTRAMUSCULAR; INTRAVENOUS; SUBCUTANEOUS
Status: CANCELLED | OUTPATIENT
Start: 2021-08-27

## 2021-07-16 RX ORDER — DIPHENHYDRAMINE HYDROCHLORIDE 50 MG/ML
50 INJECTION INTRAMUSCULAR; INTRAVENOUS
Status: CANCELLED
Start: 2021-08-27

## 2021-07-16 RX ORDER — ACETAMINOPHEN 325 MG/1
650 TABLET ORAL
Status: CANCELLED
Start: 2021-08-06

## 2021-07-16 RX ORDER — ALBUTEROL SULFATE 0.83 MG/ML
2.5 SOLUTION RESPIRATORY (INHALATION)
Status: CANCELLED | OUTPATIENT
Start: 2021-08-27

## 2021-07-16 RX ORDER — METHYLPREDNISOLONE SODIUM SUCCINATE 125 MG/2ML
125 INJECTION, POWDER, LYOPHILIZED, FOR SOLUTION INTRAMUSCULAR; INTRAVENOUS
Status: CANCELLED
Start: 2021-07-16

## 2021-07-16 RX ORDER — DIPHENHYDRAMINE HYDROCHLORIDE 50 MG/ML
50 INJECTION INTRAMUSCULAR; INTRAVENOUS
Status: CANCELLED
Start: 2021-08-06

## 2021-07-16 RX ORDER — LORAZEPAM 2 MG/ML
0.5 INJECTION INTRAMUSCULAR EVERY 4 HOURS PRN
Status: CANCELLED
Start: 2021-07-16

## 2021-07-16 RX ORDER — DIPHENHYDRAMINE HCL 25 MG
50 CAPSULE ORAL
Status: CANCELLED
Start: 2021-07-16

## 2021-07-16 RX ORDER — HEPARIN SODIUM,PORCINE 10 UNIT/ML
5 VIAL (ML) INTRAVENOUS
Status: CANCELLED | OUTPATIENT
Start: 2021-08-06

## 2021-07-16 RX ORDER — LORAZEPAM 2 MG/ML
0.5 INJECTION INTRAMUSCULAR EVERY 4 HOURS PRN
Status: CANCELLED
Start: 2021-08-06

## 2021-07-16 RX ORDER — HEPARIN SODIUM (PORCINE) LOCK FLUSH IV SOLN 100 UNIT/ML 100 UNIT/ML
5 SOLUTION INTRAVENOUS
Status: CANCELLED | OUTPATIENT
Start: 2021-08-06

## 2021-07-16 RX ORDER — SODIUM CHLORIDE 9 MG/ML
1000 INJECTION, SOLUTION INTRAVENOUS CONTINUOUS PRN
Status: CANCELLED
Start: 2021-07-16

## 2021-07-16 RX ORDER — LORAZEPAM 2 MG/ML
0.5 INJECTION INTRAMUSCULAR EVERY 4 HOURS PRN
Status: CANCELLED
Start: 2021-08-27

## 2021-07-16 RX ORDER — ALBUTEROL SULFATE 0.83 MG/ML
2.5 SOLUTION RESPIRATORY (INHALATION)
Status: CANCELLED | OUTPATIENT
Start: 2021-07-16

## 2021-07-16 RX ORDER — ALBUTEROL SULFATE 90 UG/1
1-2 AEROSOL, METERED RESPIRATORY (INHALATION)
Status: CANCELLED
Start: 2021-08-27

## 2021-07-16 RX ORDER — ALBUTEROL SULFATE 0.83 MG/ML
2.5 SOLUTION RESPIRATORY (INHALATION)
Status: CANCELLED | OUTPATIENT
Start: 2021-08-06

## 2021-07-16 RX ORDER — MEPERIDINE HYDROCHLORIDE 25 MG/ML
25 INJECTION INTRAMUSCULAR; INTRAVENOUS; SUBCUTANEOUS EVERY 30 MIN PRN
Status: CANCELLED | OUTPATIENT
Start: 2021-08-06

## 2021-07-16 RX ORDER — DIPHENHYDRAMINE HCL 25 MG
50 CAPSULE ORAL
Status: CANCELLED
Start: 2021-08-06

## 2021-07-16 RX ORDER — NALOXONE HYDROCHLORIDE 0.4 MG/ML
.1-.4 INJECTION, SOLUTION INTRAMUSCULAR; INTRAVENOUS; SUBCUTANEOUS
Status: CANCELLED | OUTPATIENT
Start: 2021-08-06

## 2021-07-16 RX ORDER — NALOXONE HYDROCHLORIDE 0.4 MG/ML
.1-.4 INJECTION, SOLUTION INTRAMUSCULAR; INTRAVENOUS; SUBCUTANEOUS
Status: CANCELLED | OUTPATIENT
Start: 2021-07-16

## 2021-07-16 RX ORDER — HEPARIN SODIUM (PORCINE) LOCK FLUSH IV SOLN 100 UNIT/ML 100 UNIT/ML
5 SOLUTION INTRAVENOUS
Status: DISCONTINUED | OUTPATIENT
Start: 2021-07-16 | End: 2021-07-16 | Stop reason: HOSPADM

## 2021-07-16 RX ORDER — EPINEPHRINE 1 MG/ML
0.3 INJECTION, SOLUTION INTRAMUSCULAR; SUBCUTANEOUS EVERY 5 MIN PRN
Status: CANCELLED | OUTPATIENT
Start: 2021-07-16

## 2021-07-16 RX ORDER — ALBUTEROL SULFATE 90 UG/1
1-2 AEROSOL, METERED RESPIRATORY (INHALATION)
Status: CANCELLED
Start: 2021-08-06

## 2021-07-16 RX ORDER — METHYLPREDNISOLONE SODIUM SUCCINATE 125 MG/2ML
125 INJECTION, POWDER, LYOPHILIZED, FOR SOLUTION INTRAMUSCULAR; INTRAVENOUS
Status: CANCELLED
Start: 2021-08-27

## 2021-07-16 RX ORDER — MEPERIDINE HYDROCHLORIDE 25 MG/ML
25 INJECTION INTRAMUSCULAR; INTRAVENOUS; SUBCUTANEOUS EVERY 30 MIN PRN
Status: CANCELLED | OUTPATIENT
Start: 2021-08-27

## 2021-07-16 RX ORDER — ALBUTEROL SULFATE 90 UG/1
1-2 AEROSOL, METERED RESPIRATORY (INHALATION)
Status: CANCELLED
Start: 2021-07-16

## 2021-07-16 RX ORDER — HEPARIN SODIUM (PORCINE) LOCK FLUSH IV SOLN 100 UNIT/ML 100 UNIT/ML
5 SOLUTION INTRAVENOUS
Status: CANCELLED | OUTPATIENT
Start: 2021-08-27

## 2021-07-16 RX ADMIN — Medication 5 ML: at 11:46

## 2021-07-16 RX ADMIN — PERTUZUMAB 420 MG: 30 INJECTION, SOLUTION, CONCENTRATE INTRAVENOUS at 10:36

## 2021-07-16 RX ADMIN — TRASTUZUMAB-ANNS 300 MG: 420 INJECTION, POWDER, LYOPHILIZED, FOR SOLUTION INTRAVENOUS at 11:09

## 2021-07-16 RX ADMIN — SODIUM CHLORIDE 250 ML: 9 INJECTION, SOLUTION INTRAVENOUS at 10:36

## 2021-07-16 NOTE — PROGRESS NOTES
Infusion Nursing Note:  Leah Rangel presents today for Cycle 12 Day 1 Roro and Aditya.   Patient seen by provider today: No   present during visit today: Not Applicable.    Note: Patient arrives to infusion today feeling well. Denies any fevers, chills, shortness of breath, chest pain or cough. Eating and drinking well. Intermittent nausea that is unchanged for her as well as ongoing intermittent diarrhea which patient reports is chronic. Offers no new concerns today.     Intravenous Access:  Implanted Port.    Treatment Conditions:  ECHO/MUGA completed 6/3/2021  EF 55-60%.    Post Infusion Assessment:  Patient tolerated infusion without incident.  Blood return noted pre and post infusion.  Site patent and intact, free from redness, edema or discomfort.  No evidence of extravasations.  Access discontinued per protocol.     Discharge Plan:   Patient declined prescription refills.  Discharge instructions reviewed with: Patient.  Patient and/or family verbalized understanding of discharge instructions and all questions answered.  AVS to patient via CompufirstT.  Patient will return 8/6/2021 for next appointment.   Patient discharged in stable condition accompanied by: self.  Departure Mode: Ambulatory.      Zoey Montaño RN

## 2021-07-16 NOTE — PATIENT INSTRUCTIONS
Contact Numbers  [facility]: [phone] (for symptom and scheduling needs)    Please call the [facility] Triage line if you experience a temperature greater than or equal to 100.4, shaking chills, have uncontrolled nausea, vomiting and/or diarrhea, dizziness, shortness of breath, chest pain, bleeding, unexplained bruising, or if you have any other new/concerning symptoms, questions or concerns.     If you are having any concerning symptoms or wish to speak to a provider before your next infusion visit, please call your care coordinator or triage to notify them so we can adequately serve you.     If you need a refill on a narcotic prescription or other medication, please call triage before your infusion appointment.          July 2021 Sunday Monday Tuesday Wednesday Thursday Friday Saturday                       1     2     3       4     5     6     7     8     9     10       11     12     13     14     15     16    ONC INFUSION 6 HR (360 MIN)   9:30 AM   (360 min.)    ONC INFUSION NURSE   [facility] 17       18     19     20     21     22     23     24       25     26     27     28     29     30     31 August 2021 Sunday Monday Tuesday Wednesday Thursday Friday Saturday   1     2     3     4     5     6    ONC INFUSION 6 HR (360 MIN)   9:30 AM   (360 min.)    ONC INFUSION NURSE   [facility] 7       8     9    VIDEO VISIT RETURN  10:00 AM   (40 min.)   Deborah Hook MD   [facility] 10     11     12     13     14       15     16     17     18     19     20     21       22     23     24     25     26     27    ONC INFUSION 6 HR (360 MIN)   9:30 AM   (360 min.)    ONC INFUSION NURSE   [facility] 28       29     30    CT CHEST WO  12:00 PM   (20 min.)   [id number]   [facility] [place] [address]                                          Lab Results:  No results found for this or any previous visit (from the past 12 hour(s)).

## 2021-07-22 DIAGNOSIS — Z17.0 MALIGNANT NEOPLASM OF UPPER-INNER QUADRANT OF RIGHT BREAST IN FEMALE, ESTROGEN RECEPTOR POSITIVE (H): Primary | ICD-10-CM

## 2021-07-22 DIAGNOSIS — C50.211 MALIGNANT NEOPLASM OF UPPER-INNER QUADRANT OF RIGHT BREAST IN FEMALE, ESTROGEN RECEPTOR POSITIVE (H): Primary | ICD-10-CM

## 2021-08-06 ENCOUNTER — INFUSION THERAPY VISIT (OUTPATIENT)
Dept: ONCOLOGY | Facility: CLINIC | Age: 41
End: 2021-08-06
Attending: INTERNAL MEDICINE
Payer: COMMERCIAL

## 2021-08-06 VITALS
WEIGHT: 105.1 LBS | SYSTOLIC BLOOD PRESSURE: 138 MMHG | HEART RATE: 96 BPM | RESPIRATION RATE: 16 BRPM | DIASTOLIC BLOOD PRESSURE: 84 MMHG | OXYGEN SATURATION: 97 % | TEMPERATURE: 98.5 F | BODY MASS INDEX: 18.04 KG/M2

## 2021-08-06 DIAGNOSIS — Z17.0 MALIGNANT NEOPLASM OF UPPER-INNER QUADRANT OF RIGHT BREAST IN FEMALE, ESTROGEN RECEPTOR POSITIVE (H): Primary | ICD-10-CM

## 2021-08-06 DIAGNOSIS — C50.211 MALIGNANT NEOPLASM OF UPPER-INNER QUADRANT OF RIGHT BREAST IN FEMALE, ESTROGEN RECEPTOR POSITIVE (H): Primary | ICD-10-CM

## 2021-08-06 PROCEDURE — 96413 CHEMO IV INFUSION 1 HR: CPT

## 2021-08-06 PROCEDURE — 96417 CHEMO IV INFUS EACH ADDL SEQ: CPT

## 2021-08-06 PROCEDURE — 250N000011 HC RX IP 250 OP 636: Performed by: INTERNAL MEDICINE

## 2021-08-06 PROCEDURE — 258N000003 HC RX IP 258 OP 636: Performed by: INTERNAL MEDICINE

## 2021-08-06 RX ORDER — HEPARIN SODIUM (PORCINE) LOCK FLUSH IV SOLN 100 UNIT/ML 100 UNIT/ML
5 SOLUTION INTRAVENOUS
Status: DISCONTINUED | OUTPATIENT
Start: 2021-08-06 | End: 2021-08-06 | Stop reason: HOSPADM

## 2021-08-06 RX ADMIN — TRASTUZUMAB-ANNS 300 MG: 420 INJECTION, POWDER, LYOPHILIZED, FOR SOLUTION INTRAVENOUS at 10:48

## 2021-08-06 RX ADMIN — Medication 5 ML: at 11:30

## 2021-08-06 RX ADMIN — PERTUZUMAB 420 MG: 30 INJECTION, SOLUTION, CONCENTRATE INTRAVENOUS at 10:16

## 2021-08-06 RX ADMIN — SODIUM CHLORIDE 250 ML: 9 INJECTION, SOLUTION INTRAVENOUS at 10:16

## 2021-08-06 ASSESSMENT — PAIN SCALES - GENERAL: PAINLEVEL: NO PAIN (0)

## 2021-08-06 NOTE — PROGRESS NOTES
Infusion Nursing Note:  Leah Rangel presents today for Cycle 13 Day 1 Aditya Read.    Patient seen by provider today: No   present during visit today: Not Applicable.    Note: Leah presents to infusion today feeling well. She denies any concerns or pain today.       Intravenous Access:  Implanted Port.    Treatment Conditions:  Lab Results   Component Value Date    HGB 12.5 01/08/2021     Lab Results   Component Value Date    WBC 5.4 01/08/2021      Lab Results   Component Value Date    ANEU 2.9 01/08/2021     Lab Results   Component Value Date     01/08/2021      Lab Results   Component Value Date     01/08/2021                   Lab Results   Component Value Date    POTASSIUM 4.1 01/08/2021           No results found for: MAG         Lab Results   Component Value Date    CR 0.61 01/08/2021                   Lab Results   Component Value Date    URVASHI 9.0 01/08/2021                Lab Results   Component Value Date    BILITOTAL 0.2 01/08/2021           Lab Results   Component Value Date    ALBUMIN 3.6 01/08/2021                    Lab Results   Component Value Date    ALT 14 01/08/2021           Lab Results   Component Value Date    AST 11 01/08/2021       Results reviewed, labs MET treatment parameters, ok to proceed with treatment.  ECHO/MUGA completed 6/3/21  EF 55-60%.      Post Infusion Assessment:  Patient tolerated infusion without incident.  Blood return noted pre and post infusion.  Site patent and intact, free from redness, edema or discomfort.  No evidence of extravasations.  Access discontinued per protocol.       Discharge Plan:   Patient declined prescription refills.  Discharge instructions reviewed with: Patient.  Patient and/or family verbalized understanding of discharge instructions and all questions answered.  AVS to patient via CanwestT.  Patient will return 8/27/21 for next appointment.   Patient discharged in stable condition accompanied by:  self.  Departure Mode: Ambulatory.      Katelynn Mc RN

## 2021-08-08 NOTE — PROGRESS NOTES
"Palliative Care Outpatient Clinic Progress Note    Patient Name: Leah Rangel is being evaluated via a billable video visit.    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     She states that she feels \"sluggish\" today, which is normal for her after chemo.     She has been trying to avoid medications as much as possible and is working through her symptoms with as little as possible.   The medical cannabis has been helpful with body aches, nausea, lack of appetite, anxiety/feeling overwhelmed. With this agent available, she has been able to avoid most of her other symptom-directed medications.     She has general aches and in addition very specific joint pains  Especially her knees have been very sore and limit her function. This seems to be related to the tamoxifen. She hasn't taken medications for this yet, gets slight relief with cannabis. For now she is ok with that, but is concerned how she will do once she has to go back to work in person. She hasn't tried any other medications yet, including OTC NSAIDs. She'd be willing to try those.     She has fatigue. In part this is probably due to a lack of sleep. She wakes up quite early each day, since she tends to develop diarrhea in the mornings. She gets through most days without a nap. When she does nap, she does get some relief.   For the diarrhea, she has tried imodium without success. " This, too, seems related to tamoxifen.     She has noticed some dysphagia that she currently manages by taking very small bites. She would like to avoid invasive testing, but would be open to meeting with SLP.     Coping:  She describes well how overwhelmed and stressed she feels. She also has a sense of being traumatized by her experience. The scans have been very stressful for her. She has significant and understandable anxiety about disease progression/recurrence.   Early on she participated in a support group. Now she would be interested in working with [facility].    Social History:  Pertinent changes to social history/social situation since last visit: she is working full-time from home, will return to work in the foreseeable future. She is somewhat apprehensive about this.     Key support resources:     Advance Directive Status:  Not discussed today    Functional Status:  2 (Ambulatory and capable of all selfcare but unable to carry out any work activities; may need help with IADLs up and about > 50% of waking hours)      Impression & Recommendations & Counseliny/o woman with breast cancer s/p neoadvujant treatment and b/l mastectomy. She is currently on adjuvant treatment with the plan to transition to tamoxifen only.     Aches, joint pains: some relief with cannabis. Joint pains are likely due to tamoxifen.   - ibuprofen prn, max 2400mg daily  - recertification for medical cannabis    Dysphagia: unclear etiology  - SLP referral     Coping: she feels overwhelmed and also has anxiety about progression/recurrence.   - appointment with [facility]       Return to clinic in about 2 months    Data / Chart Review:    Review of Systems:   ROS: 10 point ROS neg other than the symptoms noted above in the HPI and pertinents here.         Physical Exam:   Physical Exam:  Vital Signs not checked, virtual visit    CONSTIT: awake, appears comfortable  EENT: MMM, EOMI, no  icterus  RESP: reg, nl effort, no cough  MSK: sitting up in bed  SKIN: no rash, no obvious lesions  NEURO: alert, oriented x3  PSYCH: appropriate affect, memory and thought process intact    The rest of a comprehensive physical examination is deferred  due to public [facility] emergency video visit restrictions.        Current pertinent medications:  MSIR 7.5-15mg q4h prn ( not taking)      Methylphenidate(not taking)  Lorazepam 0.5mg prn    : ok    Opioid safety assessment:   n/a     No pertinent allergies      Lab and imaging data reviewed:  Comments:       Video-Visit Details    Type of service:  Video Visit    Physician has received verbal consent for a Video Visit from the patient? Yes    Video Start Time: 1008    Video End Time (time video stopped): 1037    Originating Location (pt. Location): Home    Distant Location (provider location):  [facility]     Mode of Communication:  Video Conference via [facility]    Deborah Hook MD  Palliative Medicine  Pager [phone]

## 2021-08-09 ENCOUNTER — VIRTUAL VISIT (OUTPATIENT)
Dept: ONCOLOGY | Facility: CLINIC | Age: 41
End: 2021-08-09
Payer: COMMERCIAL

## 2021-08-09 DIAGNOSIS — C50.211 MALIGNANT NEOPLASM OF UPPER-INNER QUADRANT OF RIGHT BREAST IN FEMALE, ESTROGEN RECEPTOR POSITIVE (H): ICD-10-CM

## 2021-08-09 DIAGNOSIS — Z17.0 MALIGNANT NEOPLASM OF UPPER-INNER QUADRANT OF RIGHT BREAST IN FEMALE, ESTROGEN RECEPTOR POSITIVE (H): ICD-10-CM

## 2021-08-09 DIAGNOSIS — R13.10 DYSPHAGIA, UNSPECIFIED TYPE: Primary | ICD-10-CM

## 2021-08-09 PROCEDURE — 99214 OFFICE O/P EST MOD 30 MIN: CPT | Mod: 95 | Performed by: HOSPITALIST

## 2021-08-09 RX ORDER — LORAZEPAM 0.5 MG/1
0.5 TABLET ORAL EVERY 4 HOURS PRN
Qty: 10 TABLET | Refills: 0 | Status: SHIPPED | OUTPATIENT
Start: 2021-08-09

## 2021-08-09 ASSESSMENT — PAIN SCALES - GENERAL: PAINLEVEL: NO PAIN (0)

## 2021-08-09 NOTE — LETTER
"    8/9/2021         RE: Leah Rangel  [address] [place] [address]        Dear Colleague,    Thank you for referring your patient, Leah Rangel, to the [facility] CANCER [facility]. Please see a copy of my visit note below.    Palliative Care Outpatient Clinic Progress Note    Patient Name: Leah Rangel is being evaluated via a billable video visit.    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     She states that she feels \"sluggish\" today, which is normal for her after chemo.     She has been trying to avoid medications as much as possible and is working through her symptoms with as little as possible.   The medical cannabis has been helpful with body aches, nausea, lack of appetite, anxiety/feeling overwhelmed. With this agent available, she has been able to avoid most of her other symptom-directed medications.     She has general aches and in addition very specific joint pains  Especially her knees have been very sore and limit her function. This seems to be related to the tamoxifen. She hasn't taken medications for this yet, gets slight relief with cannabis. For now she is ok with that, but is concerned how she will do once she has to go back to work in person. She hasn't tried any other medications yet, including OTC NSAIDs. She'd be willing to try those.     She has fatigue. In part this is probably due " to a lack of sleep. She wakes up quite early each day, since she tends to develop diarrhea in the mornings. She gets through most days without a nap. When she does nap, she does get some relief.   For the diarrhea, she has tried imodium without success. This, too, seems related to tamoxifen.     She has noticed some dysphagia that she currently manages by taking very small bites. She would like to avoid invasive testing, but would be open to meeting with SLP.     Coping:  She describes well how overwhelmed and stressed she feels. She also has a sense of being traumatized by her experience. The scans have been very stressful for her. She has significant and understandable anxiety about disease progression/recurrence.   Early on she participated in a support group. Now she would be interested in working with [facility].    Social History:  Pertinent changes to social history/social situation since last visit: she is working full-time from home, will return to work in the foreseeable future. She is somewhat apprehensive about this.     Key support resources:     Advance Directive Status:  Not discussed today    Functional Status:  2 (Ambulatory and capable of all selfcare but unable to carry out any work activities; may need help with IADLs up and about > 50% of waking hours)      Impression & Recommendations & Counseliny/o woman with breast cancer s/p neoadvujant treatment and b/l mastectomy. She is currently on adjuvant treatment with the plan to transition to tamoxifen only.     Aches, joint pains: some relief with cannabis. Joint pains are likely due to tamoxifen.   - ibuprofen prn, max 2400mg daily  - recertification for medical cannabis    Dysphagia: unclear etiology  - SLP referral     Coping: she feels overwhelmed and also has anxiety about progression/recurrence.   - appointment with [facility]       Return to clinic in about 2 months    Data / Chart Review:    Review  of Systems:   ROS: 10 point ROS neg other than the symptoms noted above in the HPI and pertinents here.         Physical Exam:   Physical Exam:  Vital Signs not checked, virtual visit    CONSTIT: awake, appears comfortable  EENT: MMM, EOMI, no icterus  RESP: reg, nl effort, no cough  MSK: sitting up in bed  SKIN: no rash, no obvious lesions  NEURO: alert, oriented x3  PSYCH: appropriate affect, memory and thought process intact    The rest of a comprehensive physical examination is deferred  due to public health emergency video visit restrictions.        Current pertinent medications:  MSIR 7.5-15mg q4h prn ( not taking)      Methylphenidate(not taking)  Lorazepam 0.5mg prn    : ok    Opioid safety assessment:   n/a     No pertinent allergies      Lab and imaging data reviewed:  Comments:       Video-Visit Details    Type of service:  Video Visit    Physician has received verbal consent for a Video Visit from the patient? Yes    Video Start Time: 1008    Video End Time (time video stopped): 1037    Originating Location (pt. Location): Home    Distant Location (provider location):  [facility]     Mode of Communication:  Video Conference via [facility]    Deborah Hook MD  Palliative Medicine  Pager [phone]        Again, thank you for allowing me to participate in the care of your patient.        Sincerely,        Deborah Hook MD

## 2021-08-09 NOTE — PATIENT INSTRUCTIONS
Patient Instructions      Expand All Collapse All    Thank you for attending the Palliative Care Clinic appointment today.     1. I recertified you for the medical cannabis program     2. I sent a referral for the Speech Language Therapy team    3. Please try ibuprofen 200-400mg for your joint pain. We can increase that dose further. Please do not take more than 2,400mg per day.     4. You will get a call to schedule with [facility]. She is the  and counselor on our team.        Return to clinic in about 2 months for a follow up.     You can reach the Palliative Care Team during business hours at the following number:    - [phone]  - or send me a [employer] message    To reach the Palliative Care Provider on-call After-hours or on holidays and weekends, call: [phone].  Please note that we are not able to provide pain medication refills on evenings or weekends.

## 2021-08-09 NOTE — NURSING NOTE
Leah is a 41 year old who is being evaluated via a billable video visit.      How would you like to obtain your AVS? MyChart  If the video visit is dropped, the invitation should be resent by: Text to cell phone: [phone]  Will anyone else be joining your video visit? No    Video-Visit Details    Type of service:  Video Visit    Originating Location (pt. Location): Home    Distant Location (provider location):  [facility]     Platform used for Video Visit: Lucio Obregon CMA

## 2021-08-16 ENCOUNTER — MYC MEDICAL ADVICE (OUTPATIENT)
Dept: ONCOLOGY | Facility: CLINIC | Age: 41
End: 2021-08-16

## 2021-08-27 ENCOUNTER — APPOINTMENT (OUTPATIENT)
Dept: LAB | Facility: CLINIC | Age: 41
End: 2021-08-27
Attending: INTERNAL MEDICINE
Payer: COMMERCIAL

## 2021-08-27 ENCOUNTER — INFUSION THERAPY VISIT (OUTPATIENT)
Dept: ONCOLOGY | Facility: CLINIC | Age: 41
End: 2021-08-27
Attending: INTERNAL MEDICINE
Payer: COMMERCIAL

## 2021-08-27 VITALS
OXYGEN SATURATION: 96 % | HEART RATE: 85 BPM | TEMPERATURE: 99.6 F | WEIGHT: 107.8 LBS | SYSTOLIC BLOOD PRESSURE: 120 MMHG | RESPIRATION RATE: 16 BRPM | DIASTOLIC BLOOD PRESSURE: 75 MMHG | BODY MASS INDEX: 18.5 KG/M2

## 2021-08-27 DIAGNOSIS — C50.211 MALIGNANT NEOPLASM OF UPPER-INNER QUADRANT OF RIGHT BREAST IN FEMALE, ESTROGEN RECEPTOR POSITIVE (H): Primary | ICD-10-CM

## 2021-08-27 DIAGNOSIS — Z17.0 MALIGNANT NEOPLASM OF UPPER-INNER QUADRANT OF RIGHT BREAST IN FEMALE, ESTROGEN RECEPTOR POSITIVE (H): Primary | ICD-10-CM

## 2021-08-27 LAB
ALBUMIN SERPL-MCNC: 3.7 G/DL (ref 3.4–5)
ALP SERPL-CCNC: 56 U/L (ref 40–150)
ALT SERPL W P-5'-P-CCNC: 23 U/L (ref 0–50)
ANION GAP SERPL CALCULATED.3IONS-SCNC: 8 MMOL/L (ref 3–14)
AST SERPL W P-5'-P-CCNC: 17 U/L (ref 0–45)
BASOPHILS # BLD AUTO: 0.1 10E3/UL (ref 0–0.2)
BASOPHILS NFR BLD AUTO: 1 %
BILIRUB SERPL-MCNC: 0.3 MG/DL (ref 0.2–1.3)
BUN SERPL-MCNC: 8 MG/DL (ref 7–30)
CALCIUM SERPL-MCNC: 9.2 MG/DL (ref 8.5–10.1)
CHLORIDE BLD-SCNC: 101 MMOL/L (ref 94–109)
CO2 SERPL-SCNC: 27 MMOL/L (ref 20–32)
CREAT SERPL-MCNC: 0.69 MG/DL (ref 0.52–1.04)
EOSINOPHIL # BLD AUTO: 0.2 10E3/UL (ref 0–0.7)
EOSINOPHIL NFR BLD AUTO: 2 %
ERYTHROCYTE [DISTWIDTH] IN BLOOD BY AUTOMATED COUNT: 12.9 % (ref 10–15)
GFR SERPL CREATININE-BSD FRML MDRD: >90 ML/MIN/1.73M2
GLUCOSE BLD-MCNC: 83 MG/DL (ref 70–99)
HCT VFR BLD AUTO: 35.3 % (ref 35–47)
HGB BLD-MCNC: 12.3 G/DL (ref 11.7–15.7)
IMM GRANULOCYTES # BLD: 0 10E3/UL
IMM GRANULOCYTES NFR BLD: 0 %
LYMPHOCYTES # BLD AUTO: 3.2 10E3/UL (ref 0.8–5.3)
LYMPHOCYTES NFR BLD AUTO: 33 %
MCH RBC QN AUTO: 37.3 PG (ref 26.5–33)
MCHC RBC AUTO-ENTMCNC: 34.8 G/DL (ref 31.5–36.5)
MCV RBC AUTO: 107 FL (ref 78–100)
MONOCYTES # BLD AUTO: 0.6 10E3/UL (ref 0–1.3)
MONOCYTES NFR BLD AUTO: 6 %
NEUTROPHILS # BLD AUTO: 5.8 10E3/UL (ref 1.6–8.3)
NEUTROPHILS NFR BLD AUTO: 58 %
NRBC # BLD AUTO: 0 10E3/UL
NRBC BLD AUTO-RTO: 0 /100
PLATELET # BLD AUTO: 252 10E3/UL (ref 150–450)
POTASSIUM BLD-SCNC: 4.3 MMOL/L (ref 3.4–5.3)
PROT SERPL-MCNC: 7.2 G/DL (ref 6.8–8.8)
RBC # BLD AUTO: 3.3 10E6/UL (ref 3.8–5.2)
SODIUM SERPL-SCNC: 136 MMOL/L (ref 133–144)
WBC # BLD AUTO: 9.9 10E3/UL (ref 4–11)

## 2021-08-27 PROCEDURE — 85025 COMPLETE CBC W/AUTO DIFF WBC: CPT

## 2021-08-27 PROCEDURE — 80053 COMPREHEN METABOLIC PANEL: CPT

## 2021-08-27 PROCEDURE — 250N000011 HC RX IP 250 OP 636: Performed by: INTERNAL MEDICINE

## 2021-08-27 PROCEDURE — 258N000003 HC RX IP 258 OP 636: Performed by: INTERNAL MEDICINE

## 2021-08-27 PROCEDURE — 96413 CHEMO IV INFUSION 1 HR: CPT

## 2021-08-27 PROCEDURE — 96417 CHEMO IV INFUS EACH ADDL SEQ: CPT

## 2021-08-27 PROCEDURE — 36591 DRAW BLOOD OFF VENOUS DEVICE: CPT

## 2021-08-27 RX ORDER — HEPARIN SODIUM (PORCINE) LOCK FLUSH IV SOLN 100 UNIT/ML 100 UNIT/ML
5 SOLUTION INTRAVENOUS
Status: COMPLETED | OUTPATIENT
Start: 2021-08-27 | End: 2021-08-27

## 2021-08-27 RX ORDER — HEPARIN SODIUM (PORCINE) LOCK FLUSH IV SOLN 100 UNIT/ML 100 UNIT/ML
5 SOLUTION INTRAVENOUS
Status: DISCONTINUED | OUTPATIENT
Start: 2021-08-27 | End: 2021-08-27 | Stop reason: HOSPADM

## 2021-08-27 RX ADMIN — SODIUM CHLORIDE 250 ML: 9 INJECTION, SOLUTION INTRAVENOUS at 11:02

## 2021-08-27 RX ADMIN — Medication 5 ML: at 11:31

## 2021-08-27 RX ADMIN — PERTUZUMAB 420 MG: 30 INJECTION, SOLUTION, CONCENTRATE INTRAVENOUS at 10:25

## 2021-08-27 RX ADMIN — Medication 5 ML: at 09:46

## 2021-08-27 RX ADMIN — TRASTUZUMAB-ANNS 300 MG: 420 INJECTION, POWDER, LYOPHILIZED, FOR SOLUTION INTRAVENOUS at 10:56

## 2021-08-27 ASSESSMENT — PAIN SCALES - GENERAL: PAINLEVEL: NO PAIN (0)

## 2021-08-27 NOTE — PROGRESS NOTES
Infusion Nursing Note:  Leah Rangel presents today for Cycle 14 Day 1 Perjeta and Kanjiniti.    Patient seen by provider today: No   present during visit today: Not Applicable.    Note: pt assessed upon arrival to infusion.  Pt denies fever, chills, SOB, or cough.  Pt states that she has been feeling well; no changes or concerns in her health.      Reviewed today's plan of care with patient.   Pt requested that her labs on Monday be cancelled since she labs drawn prior to infusion and were WNL.  IB message sent to scheduling and ROSY Richards to cancel lab appointment.    Intravenous Access:  Implanted Port.    Treatment Conditions:  ECHO 6/3 EF 55-60%       Ref. Range 8/27/2021 09:52   Sodium Latest Ref Range: 133 - 144 mmol/L 136   Potassium Latest Ref Range: 3.4 - 5.3 mmol/L 4.3   Chloride Latest Ref Range: 94 - 109 mmol/L 101   Carbon Dioxide Latest Ref Range: 20 - 32 mmol/L 27   Urea Nitrogen Latest Ref Range: 7 - 30 mg/dL 8   Creatinine Latest Ref Range: 0.52 - 1.04 mg/dL 0.69   GFR Estimate Latest Ref Range: >60 mL/min/1.73m2 >90   Calcium Latest Ref Range: 8.5 - 10.1 mg/dL 9.2   Anion Gap Latest Ref Range: 3 - 14 mmol/L 8   Albumin Latest Ref Range: 3.4 - 5.0 g/dL 3.7   Protein Total Latest Ref Range: 6.8 - 8.8 g/dL 7.2   Bilirubin Total Latest Ref Range: 0.2 - 1.3 mg/dL 0.3   Alkaline Phosphatase Latest Ref Range: 40 - 150 U/L 56   ALT Latest Ref Range: 0 - 50 U/L 23   AST Latest Ref Range: 0 - 45 U/L 17   Glucose Latest Ref Range: 70 - 99 mg/dL 83   WBC Latest Ref Range: 4.0 - 11.0 10e3/uL 9.9   Hemoglobin Latest Ref Range: 11.7 - 15.7 g/dL 12.3   Hematocrit Latest Ref Range: 35.0 - 47.0 % 35.3   Platelet Count Latest Ref Range: 150 - 450 10e3/uL 252   RBC Count Latest Ref Range: 3.80 - 5.20 10e6/uL 3.30 (L)   MCV Latest Ref Range: 78 - 100 fL 107 (H)   MCH Latest Ref Range: 26.5 - 33.0 pg 37.3 (H)   MCHC Latest Ref Range: 31.5 - 36.5 g/dL 34.8   RDW Latest Ref Range: 10.0 - 15.0 %  12.9   % Neutrophils Latest Units: % 58   % Lymphocytes Latest Units: % 33   % Monocytes Latest Units: % 6   % Eosinophils Latest Units: % 2   Absolute Basophils Latest Ref Range: 0.0 - 0.2 10e3/uL 0.1   % Basophils Latest Units: % 1   Absolute Eosinophils Latest Ref Range: 0.0 - 0.7 10e3/uL 0.2   Absolute Immature Granulocytes Latest Ref Range: <=0.0 10e3/uL 0.0   Absolute Lymphocytes Latest Ref Range: 0.8 - 5.3 10e3/uL 3.2   Absolute Monocytes Latest Ref Range: 0.0 - 1.3 10e3/uL 0.6   % Immature Granulocytes Latest Units: % 0   Absolute Neutrophils Latest Ref Range: 1.6 - 8.3 10e3/uL 5.8   Absolute NRBCs Latest Units: 10e3/uL 0.0   NRBCs per 100 WBC Latest Ref Range: <1 /100 0       Post Infusion Assessment:  Patient tolerated infusion without incident.  Blood return noted pre and post infusion.  Site patent and intact, free from redness, edema or discomfort.  No evidence of extravasations.  Access discontinued per protocol.       Discharge Plan:   Patient declined prescription refills.  AVS to patient via [employer].  Patient has completed her treatment plan; no future infusion appointments needed at this time. Pt aware of appointments scheduled on 8/30  Patient discharged in stable condition accompanied by: self.  Departure Mode: Ambulatory.    Negin Cho RN

## 2021-08-30 ENCOUNTER — LAB (OUTPATIENT)
Dept: LAB | Facility: CLINIC | Age: 41
End: 2021-08-30
Payer: COMMERCIAL

## 2021-08-30 ENCOUNTER — ANCILLARY PROCEDURE (OUTPATIENT)
Dept: CT IMAGING | Facility: CLINIC | Age: 41
End: 2021-08-30
Attending: INTERNAL MEDICINE
Payer: COMMERCIAL

## 2021-08-30 DIAGNOSIS — Z17.0 MALIGNANT NEOPLASM OF UPPER-INNER QUADRANT OF RIGHT BREAST IN FEMALE, ESTROGEN RECEPTOR POSITIVE (H): ICD-10-CM

## 2021-08-30 DIAGNOSIS — Z11.59 ENCOUNTER FOR SCREENING FOR OTHER VIRAL DISEASES: ICD-10-CM

## 2021-08-30 DIAGNOSIS — C50.211 MALIGNANT NEOPLASM OF UPPER-INNER QUADRANT OF RIGHT BREAST IN FEMALE, ESTROGEN RECEPTOR POSITIVE (H): ICD-10-CM

## 2021-08-30 DIAGNOSIS — R91.8 PULMONARY NODULES: ICD-10-CM

## 2021-08-30 LAB — SARS-COV-2 RNA RESP QL NAA+PROBE: NEGATIVE

## 2021-08-30 PROCEDURE — U0003 INFECTIOUS AGENT DETECTION BY NUCLEIC ACID (DNA OR RNA); SEVERE ACUTE RESPIRATORY SYNDROME CORONAVIRUS 2 (SARS-COV-2) (CORONAVIRUS DISEASE [COVID-19]), AMPLIFIED PROBE TECHNIQUE, MAKING USE OF HIGH THROUGHPUT TECHNOLOGIES AS DESCRIBED BY CMS-2020-01-R: HCPCS | Mod: 90 | Performed by: PATHOLOGY

## 2021-08-30 PROCEDURE — 71250 CT THORAX DX C-: CPT | Mod: GC | Performed by: RADIOLOGY

## 2021-08-30 PROCEDURE — U0005 INFEC AGEN DETEC AMPLI PROBE: HCPCS | Mod: 90 | Performed by: PATHOLOGY

## 2021-09-01 ENCOUNTER — VIRTUAL VISIT (OUTPATIENT)
Dept: ONCOLOGY | Facility: CLINIC | Age: 41
End: 2021-09-01
Attending: INTERNAL MEDICINE
Payer: COMMERCIAL

## 2021-09-01 DIAGNOSIS — C50.211 MALIGNANT NEOPLASM OF UPPER-INNER QUADRANT OF RIGHT BREAST IN FEMALE, ESTROGEN RECEPTOR POSITIVE (H): Primary | ICD-10-CM

## 2021-09-01 DIAGNOSIS — Z17.0 MALIGNANT NEOPLASM OF UPPER-INNER QUADRANT OF RIGHT BREAST IN FEMALE, ESTROGEN RECEPTOR POSITIVE (H): Primary | ICD-10-CM

## 2021-09-01 PROCEDURE — 99214 OFFICE O/P EST MOD 30 MIN: CPT | Mod: 95 | Performed by: PHYSICIAN ASSISTANT

## 2021-09-01 NOTE — LETTER
9/1/2021         RE: Leah Rangel  [address] [place] [address]        Dear Colleague,    Thank you for referring your patient, Leah Rangel, to the [facility] CANCER [facility]. Please see a copy of my visit note below.    Leah is a 41 year old who is being evaluated via a billable video visit. Patient reviewed meds and allergies via [employer].    How would you like to obtain your AVS? Adap[employer]art  If the video visit is dropped, the invitation should be resent by: Text to cell phone: [phone]  Will anyone else be joining your video visit? No      Video Start Time: 3:35  Video-Visit Details    Type of service:  Video Visit    Video End Time:3:47 PM    Originating Location (pt. Location): Home    Distant Location (provider location):  [facility]     Platform used for Video Visit: [employer]         Reason for Visit: follow-up invasive ductal carcinoma of the R breast, grade 3, ER positive, KS positive, HER-2 positive 3+ by IHC    Oncology HPI:   Laeh Rangel is a  41 year old, premenopausal female, diagnosed with R breast cancer, T2N0 right breast cancer.      Leah reports that she has a history of bilateral implants.  She noticed a palpable mass involving her right breast in the upper inner quadrant in early 06/2020.  At that time, she went in for an evaluation with a mammogram and ultrasound.  Mammogram on 06/11/2020 revealed a 7 mm mass at the 1 o'clock position approximately 5 cm from the nipple in the upper inner quadrant of the right breast.  There was a second area measuring 6 x 5 x 4 mm at the 1 o'clock position 6 cm from the nipple.  In the left breast, there was an area of microcalcifications.  She was referred for a right breast biopsy.  Both areas at the 1 o'clock position were biopsied.  No lymph nodes were appreciated.  Both of these biopsies revealed an invasive ductal carcinoma, grade 3, ER positive, KS positive, HER-2 positive, 3+ by IHC.   She  was considered to have a T1b multifocal N1 breast cancer.  On further review, it appeared to be T2Nx. She was subsequently referred for a breast MRI.  The BMRI done on 7/10/20 showed a 4.5 x 2.4 x 3 cm mass in the R upper, inner breast. She underwent a PET/CT 7/16 and started neoadjuvant treatment with Docetaxel/Carboplatin/Trastuzumab/Pertuzumab (TCHP) 7/17/20.  Lymph nodes appeared normal on MRI and PET.     She completed five cycles of TCHP 10/9/20, had a sixth cycle with just herceptin and perjeta on 10/30/20.  She had significant problems with fatigue, nausea, weight loss and diarrhea.  As a result, her treatment was stopped one cycle early.     She underwent bilateral mastectomies and sentinel lymph node biopsy on 11/11/20 with Dr Marquez Vargas and Dr Piotr Ochoa. She had a pathologic CR. She was continued on adjuvant trastuzumab/pertuzumab on 11/23/20 with a plan for one year of adjuvant therapy. She was also started on adjuvant tamoxifen. She completed HP on 8/27/21.        Interval history: Leah is feeling well. She is really excited to be done with HP and have port out tomorrow. Mentally she feels like she is closing a chapter on her cancer journey and is moving on. She continues to have some joint pains in her knees and has been undergoing PT for cording and is planning to ask about some knee strengthening exercises. She has some fatigue but this is improving.     No lumps/bumps, fevers/chills, or infections. Eating and drinking well.     Current Outpatient Medications   Medication Sig Dispense Refill     bimatoprost (LATISSE) 0.03 % external opthalmic solution Apply 1 drop topically At Bedtime 5 mL 1     diphenhydrAMINE-acetaminophen (TYLENOL PM)  MG tablet Take 1 tablet by mouth nightly as needed for sleep       lidocaine (XYLOCAINE) 2 % external gel Apply topically 3 times daily as needed for moderate pain 30 mL 1     lidocaine-prilocaine (EMLA) 2.5-2.5 % external cream Apply topically as  needed for moderate pain Apply to PORT site 45 minutes prior to procedure.  Cover with a non-absorbent dressing. 30 g 4     LORazepam (ATIVAN) 0.5 MG tablet Take 1 tablet (0.5 mg) by mouth every 4 hours as needed (Anxiety, Nausea/Vomiting or Sleep) 10 tablet 0     magic mouthwash suspension, diphenhydrAMINE, lidocaine, aluminum-magnesium & simethicone, (FIRST-MOUTHWASH BLM) compounding kit Swish and swallow 5-10 mLs in mouth every 6 hours as needed for mouth sores (Patient not taking: Reported on 6/4/2021) 237 mL 1     order for DME Equipment: Wig  Diagnosis: cranial alopecia 1 each 0     prochlorperazine (COMPAZINE) 10 MG tablet Take 1 tablet (10 mg) by mouth every 6 hours as needed (Nausea/Vomiting) 30 tablet 3     tamoxifen (NOLVADEX) 20 MG tablet Take 1 tablet (20 mg) by mouth daily 90 tablet 3     Vaginal Lubricant (REPLENS) GEL Apply vaginally as needed 35 g 1          Allergies   Allergen Reactions     Codeine Other (See Comments)     Got hyper         Video physical exam  General: Patient appears well in no acute distress.   Skin: No visualized rash or lesions on visualized skin.   Eyes: EOMI, no erythema, sclera icterus or discharge noted  Resp: Appears to be breathing comfortably without accessory muscle usage, speaking in full sentences, no cough  MSK: Appears to have normal range of motion based on visualized movements  Neurologic: No apparent tremors, facial movements symmetric  Psych: affect bright, engaged, alert and oriented    The rest of a comprehensive physical examination is deferred due to PHE (public health emergency) video restrictions       Wt Readings from Last 4 Encounters:   08/27/21 48.9 kg (107 lb 12.8 oz)   08/06/21 47.7 kg (105 lb 1.6 oz)   07/16/21 48.3 kg (106 lb 6.4 oz)   06/25/21 48.9 kg (107 lb 12.8 oz)         Labs: n/a    Imaging: CT chest w/o 8/30  Impression: Stable 3 mm subpleural nodule in the anterior right upper  lobe. No suspicious pulmonary nodule or  evidence of  metastatic  disease in the chest.    Impression/plan:   ER/NM +, HER-2 positive R upper/inner quadrant breast cancer. Clinically T2N0. Treated with neoadjuvant TCHP x 6 cycles and herceptin/perjeta alone with cycle 6. S/P bilateral mastecomies and sentinel LN bx on 11/11/20 with a pathologic CR.  -continued with adjuvant herceptin/perjeta on 11/23/20, and completed this 8/27/21.   -Continue on tamoxifen 20 mg daily  -Follow-up in 3 months. Reviewed surveillance plan again today.   -Port removal scheduled to tomorrow     Cardiac monitoring while on HER2 therapy  -echo every 3 months, last done 6/3/21 WNL.   -No need to repeat unless symptomatic    Pulmonary nodule: CT Chest from a few days ago showed stability. Follow-up exam in a year.     Fatigue: Improving    25 minutes spent on the date of the encounter doing chart review, review of test results, interpretation of tests, patient visit and documentation     Tahmina Roberson PA-C                         Again, thank you for allowing me to participate in the care of your patient.        Sincerely,        Tahmina Roberson PA-C

## 2021-09-01 NOTE — PROGRESS NOTES
Leah is a 41 year old who is being evaluated via a billable video visit. Patient reviewed meds and allergies via [email or web address].    How would you like to obtain your AVS? [email or web address]  If the video visit is dropped, the invitation should be resent by: Text to cell phone: [phone]  Will anyone else be joining your video visit? No      Video Start Time: 3:35  Video-Visit Details    Type of service:  Video Visit    Video End Time:3:47 PM    Originating Location (pt. Location): Home    Distant Location (provider location):  [facility]     Platform used for Video Visit: [employer]         Reason for Visit: follow-up invasive ductal carcinoma of the R breast, grade 3, ER positive, IL positive, HER-2 positive 3+ by IHC    Oncology HPI:   Leah Rangel is a  41 year old, premenopausal female, diagnosed with R breast cancer, T2N0 right breast cancer.      Leah reports that she has a history of bilateral implants.  She noticed a palpable mass involving her right breast in the upper inner quadrant in early 06/2020.  At that time, she went in for an evaluation with a mammogram and ultrasound.  Mammogram on 06/11/2020 revealed a 7 mm mass at the 1 o'clock position approximately 5 cm from the nipple in the upper inner quadrant of the right breast.  There was a second area measuring 6 x 5 x 4 mm at the 1 o'clock position 6 cm from the nipple.  In the left breast, there was an area of microcalcifications.  She was referred for a right breast biopsy.  Both areas at the 1 o'clock position were biopsied.  No lymph nodes were appreciated.  Both of these biopsies revealed an invasive ductal carcinoma, grade 3, ER positive, IL positive, HER-2 positive, 3+ by IHC.  She  was considered to have a T1b multifocal N1 breast cancer.  On further review, it appeared to be T2Nx. She was subsequently referred for a breast MRI.  The BMRI done on 7/10/20 showed a 4.5 x 2.4 x 3 cm mass in the R upper, inner breast. She underwent  a PET/CT 7/16 and started neoadjuvant treatment with Docetaxel/Carboplatin/Trastuzumab/Pertuzumab (TCHP) 7/17/20.  Lymph nodes appeared normal on MRI and PET.     She completed five cycles of TCHP 10/9/20, had a sixth cycle with just herceptin and perjeta on 10/30/20.  She had significant problems with fatigue, nausea, weight loss and diarrhea.  As a result, her treatment was stopped one cycle early.     She underwent bilateral mastectomies and sentinel lymph node biopsy on 11/11/20 with Dr Marquez Vargas and Dr Piotr Ochoa. She had a pathologic CR. She was continued on adjuvant trastuzumab/pertuzumab on 11/23/20 with a plan for one year of adjuvant therapy. She was also started on adjuvant tamoxifen. She completed HP on 8/27/21.        Interval history: Leah is feeling well. She is really excited to be done with HP and have port out tomorrow. Mentally she feels like she is closing a chapter on her cancer journey and is moving on. She continues to have some joint pains in her knees and has been undergoing PT for cording and is planning to ask about some knee strengthening exercises. She has some fatigue but this is improving.     No lumps/bumps, fevers/chills, or infections. Eating and drinking well.     Current Outpatient Medications   Medication Sig Dispense Refill     bimatoprost (LATISSE) 0.03 % external opthalmic solution Apply 1 drop topically At Bedtime 5 mL 1     diphenhydrAMINE-acetaminophen (TYLENOL PM)  MG tablet Take 1 tablet by mouth nightly as needed for sleep       lidocaine (XYLOCAINE) 2 % external gel Apply topically 3 times daily as needed for moderate pain 30 mL 1     lidocaine-prilocaine (EMLA) 2.5-2.5 % external cream Apply topically as needed for moderate pain Apply to PORT site 45 minutes prior to procedure.  Cover with a non-absorbent dressing. 30 g 4     LORazepam (ATIVAN) 0.5 MG tablet Take 1 tablet (0.5 mg) by mouth every 4 hours as needed (Anxiety, Nausea/Vomiting or Sleep) 10  tablet 0     magic mouthwash suspension, diphenhydrAMINE, lidocaine, aluminum-magnesium & simethicone, (FIRST-MOUTHWASH BLM) compounding kit Swish and swallow 5-10 mLs in mouth every 6 hours as needed for mouth sores (Patient not taking: Reported on 6/4/2021) 237 mL 1     order for DME Equipment: Wig  Diagnosis: cranial alopecia 1 each 0     prochlorperazine (COMPAZINE) 10 MG tablet Take 1 tablet (10 mg) by mouth every 6 hours as needed (Nausea/Vomiting) 30 tablet 3     tamoxifen (NOLVADEX) 20 MG tablet Take 1 tablet (20 mg) by mouth daily 90 tablet 3     Vaginal Lubricant (REPLENS) GEL Apply vaginally as needed 35 g 1          Allergies   Allergen Reactions     Codeine Other (See Comments)     Got hyper         Video physical exam  General: Patient appears well in no acute distress.   Skin: No visualized rash or lesions on visualized skin.   Eyes: EOMI, no erythema, sclera icterus or discharge noted  Resp: Appears to be breathing comfortably without accessory muscle usage, speaking in full sentences, no cough  MSK: Appears to have normal range of motion based on visualized movements  Neurologic: No apparent tremors, facial movements symmetric  Psych: affect bright, engaged, alert and oriented    The rest of a comprehensive physical examination is deferred due to PHE (public health emergency) video restrictions       Wt Readings from Last 4 Encounters:   08/27/21 48.9 kg (107 lb 12.8 oz)   08/06/21 47.7 kg (105 lb 1.6 oz)   07/16/21 48.3 kg (106 lb 6.4 oz)   06/25/21 48.9 kg (107 lb 12.8 oz)         Labs: n/a    Imaging: CT chest w/o 8/30  Impression: Stable 3 mm subpleural nodule in the anterior right upper  lobe. No suspicious pulmonary nodule or  evidence of metastatic  disease in the chest.    Impression/plan:   ER/NV +, HER-2 positive R upper/inner quadrant breast cancer. Clinically T2N0. Treated with neoadjuvant TCHP x 6 cycles and herceptin/perjeta alone with cycle 6. S/P bilateral mastecomies and sentinel LN  bx on 11/11/20 with a pathologic CR.  -continued with adjuvant herceptin/perjeta on 11/23/20, and completed this 8/27/21.   -Continue on tamoxifen 20 mg daily  -Follow-up in 3 months. Reviewed surveillance plan again today.   -Port removal scheduled to tomorrow     Cardiac monitoring while on HER2 therapy  -echo every 3 months, last done 6/3/21 WNL.   -No need to repeat unless symptomatic    Pulmonary nodule: CT Chest from a few days ago showed stability. Follow-up exam in a year.     Fatigue: Improving    25 minutes spent on the date of the encounter doing chart review, review of test results, interpretation of tests, patient visit and documentation     Tahmina Roberson PA-C

## 2021-09-02 ENCOUNTER — ANESTHESIA EVENT (OUTPATIENT)
Dept: SURGERY | Facility: AMBULATORY SURGERY CENTER | Age: 41
End: 2021-09-02
Payer: COMMERCIAL

## 2021-09-02 ENCOUNTER — ANESTHESIA (OUTPATIENT)
Dept: SURGERY | Facility: AMBULATORY SURGERY CENTER | Age: 41
End: 2021-09-02
Payer: COMMERCIAL

## 2021-09-02 ENCOUNTER — HOSPITAL ENCOUNTER (OUTPATIENT)
Facility: AMBULATORY SURGERY CENTER | Age: 41
End: 2021-09-02
Attending: RADIOLOGY
Payer: COMMERCIAL

## 2021-09-02 ENCOUNTER — ANCILLARY PROCEDURE (OUTPATIENT)
Dept: RADIOLOGY | Facility: AMBULATORY SURGERY CENTER | Age: 41
End: 2021-09-02
Attending: INTERNAL MEDICINE
Payer: COMMERCIAL

## 2021-09-02 VITALS
DIASTOLIC BLOOD PRESSURE: 62 MMHG | OXYGEN SATURATION: 100 % | HEIGHT: 64 IN | WEIGHT: 108 LBS | BODY MASS INDEX: 18.44 KG/M2 | TEMPERATURE: 97 F | RESPIRATION RATE: 16 BRPM | SYSTOLIC BLOOD PRESSURE: 95 MMHG | HEART RATE: 71 BPM

## 2021-09-02 DIAGNOSIS — C50.211 MALIGNANT NEOPLASM OF UPPER-INNER QUADRANT OF RIGHT BREAST IN FEMALE, ESTROGEN RECEPTOR POSITIVE (H): ICD-10-CM

## 2021-09-02 DIAGNOSIS — Z17.0 MALIGNANT NEOPLASM OF UPPER-INNER QUADRANT OF RIGHT BREAST IN FEMALE, ESTROGEN RECEPTOR POSITIVE (H): ICD-10-CM

## 2021-09-02 LAB
HCG UR QL: NEGATIVE
INR PPP: 0.99 (ref 0.85–1.15)
INTERNAL QC OK POCT: NORMAL

## 2021-09-02 PROCEDURE — 36590 REMOVAL TUNNELED CV CATH: CPT | Performed by: RADIOLOGY

## 2021-09-02 PROCEDURE — 36590 REMOVAL TUNNELED CV CATH: CPT

## 2021-09-02 PROCEDURE — 81025 URINE PREGNANCY TEST: CPT | Performed by: PATHOLOGY

## 2021-09-02 RX ORDER — PROPOFOL 10 MG/ML
INJECTION, EMULSION INTRAVENOUS PRN
Status: DISCONTINUED | OUTPATIENT
Start: 2021-09-02 | End: 2021-09-02

## 2021-09-02 RX ORDER — ONDANSETRON 2 MG/ML
INJECTION INTRAMUSCULAR; INTRAVENOUS PRN
Status: DISCONTINUED | OUTPATIENT
Start: 2021-09-02 | End: 2021-09-02

## 2021-09-02 RX ORDER — LIDOCAINE 40 MG/G
CREAM TOPICAL
Status: DISCONTINUED | OUTPATIENT
Start: 2021-09-02 | End: 2021-09-03 | Stop reason: HOSPADM

## 2021-09-02 RX ORDER — LIDOCAINE HYDROCHLORIDE 20 MG/ML
INJECTION, SOLUTION INFILTRATION; PERINEURAL PRN
Status: DISCONTINUED | OUTPATIENT
Start: 2021-09-02 | End: 2021-09-02

## 2021-09-02 RX ORDER — SODIUM CHLORIDE, SODIUM LACTATE, POTASSIUM CHLORIDE, CALCIUM CHLORIDE 600; 310; 30; 20 MG/100ML; MG/100ML; MG/100ML; MG/100ML
500 INJECTION, SOLUTION INTRAVENOUS CONTINUOUS
Status: DISCONTINUED | OUTPATIENT
Start: 2021-09-02 | End: 2021-09-03 | Stop reason: HOSPADM

## 2021-09-02 RX ADMIN — PROPOFOL 200 MCG/KG/MIN: 10 INJECTION, EMULSION INTRAVENOUS at 09:48

## 2021-09-02 RX ADMIN — LIDOCAINE HYDROCHLORIDE 100 MG: 20 INJECTION, SOLUTION INFILTRATION; PERINEURAL at 09:50

## 2021-09-02 RX ADMIN — PROPOFOL 50 MG: 10 INJECTION, EMULSION INTRAVENOUS at 10:00

## 2021-09-02 RX ADMIN — ONDANSETRON 4 MG: 2 INJECTION INTRAMUSCULAR; INTRAVENOUS at 09:50

## 2021-09-02 RX ADMIN — PROPOFOL 50 MG: 10 INJECTION, EMULSION INTRAVENOUS at 09:49

## 2021-09-02 RX ADMIN — SODIUM CHLORIDE, SODIUM LACTATE, POTASSIUM CHLORIDE, CALCIUM CHLORIDE: 600; 310; 30; 20 INJECTION, SOLUTION INTRAVENOUS at 09:43

## 2021-09-02 ASSESSMENT — MIFFLIN-ST. JEOR: SCORE: 1134.88

## 2021-09-02 NOTE — ANESTHESIA CARE TRANSFER NOTE
Patient: Leah Rangel    Procedure(s):  REMOVAL, VASCULAR ACCESS PORT RIGHT    Diagnosis: Malignant neoplasm of upper-inner quadrant of right breast in female, estrogen receptor positive (H) [C50.211, Z17.0]  Diagnosis Additional Information: No value filed.    Anesthesia Type:   No value filed.     Note:    Oropharynx: oropharynx clear of all foreign objects  Level of Consciousness: awake  Oxygen Supplementation: room air    Independent Airway: airway patency satisfactory and stable  Dentition: dentition unchanged  Vital Signs Stable: post-procedure vital signs reviewed and stable  Report to RN Given: handoff report given  Patient transferred to: Phase II    Handoff Report: Identifed the Patient, Identified the Reponsible Provider, Reviewed the pertinent medical history, Discussed the surgical course, Reviewed Intra-OP anesthesia mangement and issues during anesthesia, Set expectations for post-procedure period and Allowed opportunity for questions and acknowledgement of understanding      Vitals:  Vitals Value Taken Time   BP     Temp     Pulse     Resp     SpO2         Electronically Signed By: ADAMA Farley CRNA  September 2, 2021  10:24 AM

## 2021-09-02 NOTE — DISCHARGE INSTRUCTIONS
A collaboration between [facility] and [facility]  Experts in minimally invasive, targeted treatments performed using imaging guidance    Venous Access Device, Port Catheter or Tunneled Central Line Removal    Today you had your existing venous access device removed, either because it was no longer needed or because there was malfunction or infection issues.    After you go home:  - Drink plenty of fluids.  Generally 6-8 (8 ounce) glasses a day is recommended.  - Resume your regular diet unless otherwise ordered by a medical provider.  - Keep any applied tape/gauze dressings clean and dry.  Change tape/gauze dressings if they get wet or soiled.  - You may shower the following day after procedure, however cover and protect from moisture any tape/gauze dressings.  You may let water hit and run over dried skin glue, but do not scrub.  Pat the area dry after showering.  - Port removal incisions are closed with absorbable suture, meaning they do not need to be removed at a later date, and a topical skin adhesive (skin glue).  This glue will wear off in 7-14 days.  Do not remove before this time.  If 14 days have passed and residual glue is present, you may gently remove it.  - You may remove tape/gauze dressings after 5 days if the site looks closed and in the process of healing.  - Do not apply gels, lotions, or ointments to the glue site for the first 10 days as this may cause the glue to prematurely soften and fail.  - Do not perform strenuous activities or lift greater than 10 pounds for the next three days.  - If there is bleeding or oozing from the procedure site, apply firm pressure to the area for 5-10 minutes.  If the bleeding continues seek medical advice at the numbers below.  - Mild procedure site discomfort can be treated with an ice pack and over-the-counter pain relievers.              For 24 hours after any sedation used:  - Relax and take it easy.  No  strenuous activities.  - Do not drive or operate machines at home or at work.  - No alcohol consumption.  - Do not make any important or legal decisions.    Call our Interventional Radiology (IR) service if:  - If you start bleeding from the procedure site.  If you do start to bleed from the site, lie down and hold some pressure on the site.  Our radiology provider can help you decide if you need to return to the hospital.  - If you have new or worsening pain related to the procedure.  - If you have concerning swelling at the procedure site.  - If you develop persistent nausea or vomiting.  - If you develop hives or a rash or any unexplained itching.  - If you have a fever of greater than 100.5  F and chills in the first 5 days after procedure.  - Any other concerns related to your procedure.      [facility]  Interventional Radiology (IR)  [address] [place] [address]    Contact Number:  [phone]  (IR control desk)  - Monday - Friday 8:00 am - 4:30 pm    After hours for urgent concerns:  [phone]  - After 4:30 pm Monday - Friday, Weekends and Holidays.   - Ask for Interventional Radiology on-call.  Someone is available 24 hours a day.  - [facility] toll free number:  [phone]

## 2021-09-02 NOTE — ANESTHESIA PREPROCEDURE EVALUATION
Anesthesia Pre-Procedure Evaluation    Patient: Leah Rangel   MRN: [id number] : 1980        Preoperative Diagnosis: Malignant neoplasm of upper-inner quadrant of right breast in female, estrogen receptor positive (H) [C50.211, Z17.0]   Procedure : Procedure(s):  REMOVAL, VASCULAR ACCESS PORT RIGHT     Past Medical History:   Diagnosis Date     Breast cancer (H)      Complication of anesthesia      PONV (postoperative nausea and vomiting)       Past Surgical History:   Procedure Laterality Date     INSERT PORT VASCULAR ACCESS Left 7/15/2020    Procedure: Port Placement;  Surgeon: Julio Banks MD;  Location: [facility] OR     INSERT PORT VASCULAR ACCESS Right 12/15/2020    Procedure: CHEST INSERTION, VASCULAR ACCESS PORT;  Surgeon: Everett Long PA-C;  Location: [facility] OR     IR CHEST PORT PLACEMENT > 5 YRS OF AGE  7/15/2020     IR CHEST PORT PLACEMENT > 5 YRS OF AGE  12/15/2020     IR PORT REMOVAL RIGHT  2021      Allergies   Allergen Reactions     Codeine Other (See Comments)     Got hyper      Social History     Tobacco Use     Smoking status: Former Smoker     Start date: 2020     Smokeless tobacco: Never Used   Substance Use Topics     Alcohol use: Yes     Comment: very rare social drink      Wt Readings from Last 1 Encounters:   21 49 kg (108 lb)        Anesthesia Evaluation            ROS/MED HX  ENT/Pulmonary:  - neg pulmonary ROS     Neurologic:  - neg neurologic ROS     Cardiovascular:  - neg cardiovascular ROS     METS/Exercise Tolerance:     Hematologic:  - neg hematologic  ROS     Musculoskeletal:  - neg musculoskeletal ROS     GI/Hepatic:  - neg GI/hepatic ROS     Renal/Genitourinary:  - neg Renal ROS     Endo:  - neg endo ROS     Psychiatric/Substance Use:  - neg psychiatric ROS     Infectious Disease:  - neg infectious disease ROS     Malignancy:   (+) Malignancy, History of Breast.    Other:  - neg other ROS          Physical Exam    Airway  airway exam normal            Respiratory Devices and Support         Dental  no notable dental history         Cardiovascular   cardiovascular exam normal          Pulmonary   pulmonary exam normal                OUTSIDE LABS:  CBC:   Lab Results   Component Value Date    WBC 9.9 08/27/2021    WBC 5.4 01/08/2021    HGB 12.3 08/27/2021    HGB 12.5 01/08/2021    HCT 35.3 08/27/2021    HCT 36.3 01/08/2021     08/27/2021     01/08/2021     BMP:   Lab Results   Component Value Date     08/27/2021     01/08/2021    POTASSIUM 4.3 08/27/2021    POTASSIUM 4.1 01/08/2021    CHLORIDE 101 08/27/2021    CHLORIDE 104 01/08/2021    CO2 27 08/27/2021    CO2 29 01/08/2021    BUN 8 08/27/2021    BUN 4 (L) 01/08/2021    CR 0.69 08/27/2021    CR 0.61 01/08/2021    GLC 83 08/27/2021     (H) 01/08/2021     COAGS:   Lab Results   Component Value Date    INR 0.99 09/02/2021     POC:   Lab Results   Component Value Date    HCG Negative 09/02/2021     HEPATIC:   Lab Results   Component Value Date    ALBUMIN 3.7 08/27/2021    PROTTOTAL 7.2 08/27/2021    ALT 23 08/27/2021    AST 17 08/27/2021    ALKPHOS 56 08/27/2021    BILITOTAL 0.3 08/27/2021     OTHER:   Lab Results   Component Value Date    URVASHI 9.2 08/27/2021       Anesthesia Plan    ASA Status:  3   NPO Status:  NPO Appropriate    Anesthesia Type: MAC.     - Reason for MAC: straight local not clinically adequate   Induction: Intravenous.   Maintenance: TIVA.        Consents    Anesthesia Plan(s) and associated risks, benefits, and realistic alternatives discussed. Questions answered and patient/representative(s) expressed understanding.     - Discussed with:  Patient      - Extended Intubation/Ventilatory Support Discussed: No.      - Patient is DNR/DNI Status: No    Use of blood products discussed: No .     Postoperative Care    Pain management: Multi-modal analgesia.   PONV prophylaxis: Ondansetron (or other 5HT-3), Background Propofol Infusion     Comments:         H&P  reviewed: Unable to attach H&P to encounter due to EHR limitations. H&P Update: appropriate H&P reviewed, patient examined. No interval changes since H&P (within 30 days).         Flaquito Peters MD

## 2021-09-02 NOTE — PROCEDURES
Interventional Radiology Brief Post Procedure Note    Procedure: IR Port Removal    Proceduralist: Julio Banks MD    Assistant: None    Time Out: Prior to the start of the procedure and with procedural staff participation, I verbally confirmed the patient s identity using two indicators, relevant allergies, that the procedure was appropriate and matched the consent or emergent situation, and that the correct equipment/implants were available. Immediately prior to starting the procedure I conducted the Time Out with the procedural staff and re-confirmed the patient s name, procedure, and site/side. (The Joint Commission universal protocol was followed.)  Yes        Sedation: Monitored Anesthesia Care (MAC) administered and documented by Anesthesia Care Provider    Findings: Completed removal of right chest port in its entirety.    Estimated Blood Loss: Minimal    SPECIMENS: None    Complications: 1. None     Condition: Stable    Plan: Follow-up per primary team.     Comments: See dictated procedure note for full details.    Everett Long PA-C

## 2021-09-02 NOTE — BRIEF OP NOTE
[facility]    Brief Operative Note    Pre-operative diagnosis: Malignant neoplasm of upper-inner quadrant of right breast in female, estrogen receptor positive (H) [C50.211, Z17.0]  Post-operative diagnosis Same as pre-operative diagnosis    Procedure: Procedure(s):  REMOVAL, VASCULAR ACCESS PORT RIGHT  Surgeon: Surgeon(s) and Role:     * Julio Banks MD - Primary     * Everett Long PA-C  Anesthesia: Monitor Anesthesia Care   Estimated blood loss: Minimal  Drains: None  Specimens: * No specimens in log *  Findings:   None.  Complications: None.  Implants:   Implant Name Type Inv. Item Serial No.  Lot No. LRB No. Used Action   CATH PORT POWERPORT CLEARVUE SLIM [id number] Port CATH PORT POWERPORT CLEARVUE SLIM 6FR [id number]   [employer] [facility] [id number] Left 1 Explanted       Right port removed completely. 20.5 cm catheter length noted. Closed primarily.

## 2021-09-08 ENCOUNTER — HOSPITAL ENCOUNTER (OUTPATIENT)
Dept: SPEECH THERAPY | Facility: CLINIC | Age: 41
Setting detail: THERAPIES SERIES
End: 2021-09-08
Attending: HOSPITALIST
Payer: COMMERCIAL

## 2021-09-08 DIAGNOSIS — R13.10 DYSPHAGIA, UNSPECIFIED TYPE: ICD-10-CM

## 2021-09-08 PROCEDURE — 92610 EVALUATE SWALLOWING FUNCTION: CPT | Mod: GN | Performed by: SPEECH-LANGUAGE PATHOLOGIST

## 2021-09-08 NOTE — PROGRESS NOTES
Speech-Language Pathology Department  CLINICAL SWALLOW EVALUATION  [facility] Yaz  Ethel     09/08/21 1100   General Information   Type Of Visit Initial   Start Of Care Date 09/08/21   Referring Physician Deborah Hook MD   Orders Evaluate And Treat   Medical Diagnosis Dysphagia, unspecified type   Onset Of Illness/injury Or Date Of Surgery 08/09/21  (Order date)   Precautions/limitations Swallowing Precautions   Pertinent History of Current Problem/OT: Additional Occupational Profile Info 40 yo woman with breast cancer s/p neoadjuvant treatment and b/l mastectomy. Her port was removed on 9/2/21. See EMR and oncology notes for more details. Pt reported ~3-month onset of the sensation of food sticking or lodging at the level of the suprasternal or jugular notch with all foods especially meat, bread, and fruits and vegetables. She indicates this occurs every other day. She reports 1x cough with water since onset. She has been cutting her food into small bites and avoiding certain foods. When she feels the sticking sensation, she attempts deep breathing and it will occasionally resolve. Otherwise she reports she brings someone with her to the restroom and she has emesis episodes. She uses protein drinks to supplement nutrition. She has had unintentional weight loss (~25lbs) during chemotherapy and has not returned to baseline or goal weight (reports ~7lbs from baseline). She denies xerostomia and indicates that mouth sores have resolved; however, has a persistent runny nose.   Respiratory Status Room air   Prior Level Of Function Swallowing   Prior Level Of Function Comment Prior to onset of symptoms pt with no history of dysphagia. Current diet of regular textures and thin liquids. Pt utilizes multiple swallows and small bites.    Patient Role/employment History Employed   Patient/family Goals To eat and drink without sensation of food sticking, to not avoid certain foods, to avoid  choking risk.   Clinical Swallow Evaluation   Oral Musculature generally intact   Structural Abnormalities none present   Dentition present and adequate   Secretion Management   (WFL)   Mucosal Quality good   Mandibular Strength and Mobility intact   Oral Labial Strength and Mobility WFL   Lingual Strength and Mobility WFL   Velar Elevation intact   Clinical Swallow Eval: Thin Liquid Texture Trial   Mode of Presentation, Thin Liquids cup;straw;self-fed   Volume of Liquid or Food Presented Over 6 oz water via single and consecutive sips   Oral Phase of Swallow WFL   Pharyngeal Phase of Swallow coughing/choking   Successful Strategies Trialed During Procedure other (see comments)  (single sip)   Diagnostic Statement Cough noted x1 with thin by consecutive straw sip only. No other overt s/s of aspiration/penetration. Pt reports this has only occured x1 since onset of symptoms.   Clinical Swallow Eval: Puree Solid Texture Trial   Mode of Presentation, Puree spoon;self-fed   Volume of Puree Presented teaspoon of applesauce x1    Oral Phase, Puree WFL   Pharyngeal Phase, Puree intact   Diagnostic Statement Intact oropharyngeal swallow. No overt s/s of penetration/aspiration.   Clinical Swallow Eval: Minced & Moist    Mode of Presentation spoon;self-fed   Volume Presented Mixed consistency fruit cup x1 teaspoon   Oral Phase WFL   Pharyngeal Phase intact   Diagnostic Statement Intact oropharyngeal swallow. No overt s/s of penetration/aspiration.   Clinical Swallow Eval: Soft & Bite-sized   Mode of Presentation self-fed   Volume Presented trisha cracker x3 bites increasing in size   Oral Phase WFL   Pharyngeal Phase intact   Diagnostic Statement Intact oropharyngeal swallow. No overt s/s of penetration/aspiration.   Clinical Swallow Eval: Regular (Solid)   Mode of Presentation self-fed   Volume Presented 1 normal bite size of ham sandwich with crust   Oral Phase WFL   Pharyngeal Phase feeling of something stuck in  "throat;repeated swallows   Successful Strategies Trialed During Procedure hard swallow;other (see comments)  (multiple hard swallows and liquid wash)   Diagnostic Statement Pt with intact oral phase. Suspect esophageal v pharyngeal phase d/t reported \"sticking\" sensation is at the level of the suprasternal or jugular notch..   Swallow Compensations   Swallow Compensations Chin tuck;Effortful swallow;Multiple swallow;Alternate viscosity of consistencies   Results Other (see comments)  (Benefit from small bites, and multiple liquid washes and eff)   Educational Assessment   Barriers to Learning No barriers   Preferred Learning Style Listening;Demonstration;Reading;Pictures/video   Esophageal Phase of Swallow   Patient reports or presents with symptoms of esophageal dysphagia Yes   Esophageal comments Pt with reports of sticking/lodging sensation with solid sandwich that eventually resolved with time, multiple swallows, effortful swallows and liquid wash. Pt reported sensation of stasis briefly moved just below the suprasternal or jugular notch until eventual clearance.   General Therapy Interventions   Intervention Comments No dysphagia therapy warranted at this time. Recommend objective diagnostic measures of swallow function via Video Fluoroscopic Swallow Study (VFSS). Pt may benefit from esophagram for further investigation of esophageal symptoms.   Swallow Eval: Clinical Impressions   Skilled Criteria for Therapy Intervention Does not meet criteria for skilled intervention;Other (see comments)  (Further assessment required at this time.)   Functional Assessment Scale (FAS) 5   Dysphagia Outcome Severity Scale (MAHAMED) Level 5 - MAHAMED   Treatment Diagnosis Mild pharyngoesophageal dysphagia.   Diet texture recommendations Thin liquids (level 0);Regular diet  (Pt can self-limit bite size and texture as needed)   Recommended Feeding/Eating Techniques alternate between small bites and sips of food/liquid;hard swallow w/ " each bite or sip;maintain upright posture during/after eating for 30 mins;small sips/bites   Rehab Potential other (see comments)  (further assessment warranted at this time)   Demonstrates Need for Referral to Another Service other (see comments)  (VFSS and consideration for GI consult and/ot esophagram)   Clinical Impression Comments Overall Ms. Rangel presents with mild dysphagia with suspected esophageal component. At this time unable to r/o possibility of pharyngoesophageal dysphagia. Pt's oral phase is intact. Pt's pharyngeal phase is suspected intact; however, Pt with cough x1 with thin liquids by consecutive straw sips. No other overt s/s of aspiration/penetration. Pt with reports of sticking/lodging sensation of solid sandwich at the level of the suprasternal or jugular notch that eventually resolves with time and multiple liquid washes, effortful swallows, and dry swallows. Pt does point below the suprasternal notch as additional location of brief hesitation before resolve. Pt independently utilizes safe swallow strategies. Recommend regular textures and thin liquids (pt to self-limit food textures as needed). See above safe swallow strategy recommendations. No dysphagia therapy warranted at this time. Recommend objective diagnostic measures of swallow function via Video Fluoroscopic Swallow Study (VFSS). Pt may benefit from esophagram for further investigation of esophageal symptoms.   Total Session Time   SLP Jennifer: oral/pharyngeal swallow function, clinical minutes (64742) 32   Total Evaluation Time 32     Thank you for the referral of this patient. Please feel free to call [phone] with any questions or concerns.    Cristina Waldrop MS, CCC-SLP  Speech-Language Pathologist  [facility]

## 2021-09-19 ENCOUNTER — HEALTH MAINTENANCE LETTER (OUTPATIENT)
Age: 41
End: 2021-09-19

## 2021-09-22 ENCOUNTER — HOSPITAL ENCOUNTER (OUTPATIENT)
Dept: GENERAL RADIOLOGY | Facility: CLINIC | Age: 41
End: 2021-09-22
Attending: HOSPITALIST
Payer: COMMERCIAL

## 2021-09-22 ENCOUNTER — HOSPITAL ENCOUNTER (OUTPATIENT)
Dept: SPEECH THERAPY | Facility: CLINIC | Age: 41
End: 2021-09-22
Attending: HOSPITALIST
Payer: COMMERCIAL

## 2021-09-22 DIAGNOSIS — R13.10 DYSPHAGIA, UNSPECIFIED TYPE: ICD-10-CM

## 2021-09-22 PROCEDURE — 92611 MOTION FLUOROSCOPY/SWALLOW: CPT | Mod: GN | Performed by: SPEECH-LANGUAGE PATHOLOGIST

## 2021-09-22 PROCEDURE — 74230 X-RAY XM SWLNG FUNCJ C+: CPT

## 2021-09-22 NOTE — PROGRESS NOTES
VIDEO SWALLOW STUDY       09/22/21 1300   General Information   Type Of Visit Initial   Start Of Care Date 09/22/21   Referring Physician Deborah Hook MD   Orders Evaluate And Treat   Medical Diagnosis Dysphagia, unspecified type   Onset Of Illness/injury Or Date Of Surgery 08/09/21  (Order date)   Pertinent History of Current Problem/OT: Additional Occupational Profile Info Patient's PMH is significant for breast cancer s/p neoadjuvant treatment and b/l mastectomy. Her port was removed on 9/2/21. See EMR and oncology notes for more details. Pt reported ~3-month onset of the sensation of food sticking or lodging at the level of the suprasternal or jugular notch with all foods especially meat, bread, and fruits and vegetables. She indicates this occurs every other day. She reports 1x cough with water since onset. She has been cutting her food into small bites and avoiding certain foods. When she feels the sticking sensation, she attempts deep breathing and it will occasionally resolve. Otherwise she reports she brings someone with her to the restroom and she has emesis episodes. She uses protein drinks to supplement nutrition. She has had unintentional weight loss (~25lbs) during chemotherapy and has not returned to baseline or goal weight (reports ~7lbs from baseline). She denies xerostomia and indicates that mouth sores have resolved; however, has a persistent runny nose.   Respiratory Status Room air   Prior Level Of Function Swallowing   Prior Level Of Function Comment Eating regular textures and thin liquids with swallowing precautions.    Patient Role/employment History Employed   Home/community Accessibility Comments (flowsheet Row) Independent   Patient/family Goals To eat and drink without sensation of food sticking, to not avoid certain foods, to avoid choking risk.   Fall Risk Screen   Have you fallen 2 or more times in the past year? No   Have you fallen and had an injury in the past year? No   Is  patient a fall risk? No   Abuse Screen (yes response referral indicated)   Feels Unsafe at Home or Work/School no   Feels Threatened by Someone no   Does Anyone Try to Keep You From Having Contact with Others or Doing Things Outside Your Home? no   Physical Signs of Abuse Present no   Clinical Swallow Evaluation   Oral Musculature generally intact  (Clinical swallow evaluation completed 9/8. )   Clinical Swallow Eval: Thin Liquid Texture Trial   Mode of Presentation, Thin Liquids cup;self-fed   Volume of Liquid or Food Presented 5 oz   Oral Phase of Swallow WFL   Pharyngeal Phase of Swallow intact   Diagnostic Statement No overt Sx of aspiration with water and barium tablet swallow   Clinical Swallow Eval: Regular (Solid)   Successful Strategies Trialed During Procedure   (multiple hard swallows and liquid wash)   VFSS Evaluation   VFSS Additional Documentation Yes   VFSS Eval: Radiology   Radiologist Dr. Camejo   Views Taken left lateral   VFSS Eval: Thin Liquid Texture Trial   Mode of Presentation, Thin Liquid cup;self-fed   Preparatory Phase WFL   Oral Phase, Thin Liquid Premature pharyngeal entry   Pharyngeal Phase, Thin Liquid Delayed swallow reflex   Rosenbek's Penetration Aspiration Scale: Thin Liquid Trial Results 1 - no aspiration, contrast does not enter airway   Diagnostic Statement Delayed swallow with premature entry to valleculae x1 and pyriform sinus x1, no penetration or aspiration   VFSS Eval: Puree Solid Texture Trial   Mode of Presentation, Puree spoon;self-fed   Order of Presentation 2 oz   Preparatory Phase WFL   Oral Phase, Puree WFL   Pharyngeal Phase, Puree WFL   Rosenbek's Penetration Aspiration Scale: Puree Food Trial Results 1 - no aspiration, contrast does not enter airway   Diagnostic Statement WFL   VFSS Eval: Regular Texture Trial (Solid)   Mode of Presentation self-fed;spoon   Preparatory Phase WFL   Oral Phase WFL   Pharyngeal Phase WFL   Rosenbek's Penetration Aspiration Scale 1 - no  aspiration, contrast does not enter airway   Diagnostic Statement No pharyngeal residue   Swallow Compensations   Results   (Benefit from small bites, and multiple liquid washes and eff)   Educational Assessment   Barriers to Learning No barriers   Preferred Learning Style Listening;Demonstration   Esophageal Phase of Swallow   Patient reports or presents with symptoms of esophageal dysphagia Yes   Esophageal sweep performed during today s vidofluoroscopic exam  Yes;Please refer to radiologist's report for details   Esophageal comments Follow through with pill indicated timely clearance of tablet to the stomach   Swallow Eval: Clinical Impressions   Skilled Criteria for Therapy Intervention No problems identified which require skilled intervention   Functional Assessment Scale (FAS) 6   Dysphagia Outcome Severity Scale (MAHAMED) Level 6 - MAHAMED   Treatment Diagnosis Minimal oropharyngeal dysphagia, suspected esophageal dysphagia    Diet texture recommendations Thin liquids (level 0);Regular diet   Recommended Feeding/Eating Techniques alternate between small bites and sips of food/liquid;maintain upright posture during/after eating for 30 mins;small sips/bites   Rehab Potential good, to achieve stated therapy goals   Predicted Duration of Therapy Intervention (days/wks) n/a   Anticipated Discharge Disposition home   Risks and Benefits of Treatment have been explained. Yes   Patient, family and/or staff in agreement with Plan of Care Yes   Clinical Impression Comments Patient presents with minimal oropharyngeal dysphagia characterized by delayed swallow response with premature pharyngeal entry of thin liquids reaching pyriform sinus level x1 before swallow was triggered, however, airway protection was adequate without any laryngeal penetration or aspiration observed throughout the course of the study.  The patient swallowed a 13 mm barium tablet without restriction, cleared to stomach with brief follow through.   Pharyngeal constriction indicative of good strength with complete pharyngeal clearance of food.  Patient's symptoms most closely correlate with possible reflux or esophageal dysmotility, thus, would recommend consider further esophageal dysphagia and reflux workup.    Total Session Time   SLP Eval: VideoFluoroscopic Swallow function Minutes (46442) 45   Total Evaluation Time 45

## 2021-09-23 DIAGNOSIS — R13.19 ESOPHAGEAL DYSPHAGIA: Primary | ICD-10-CM

## 2021-10-13 ENCOUNTER — VIRTUAL VISIT (OUTPATIENT)
Dept: ONCOLOGY | Facility: CLINIC | Age: 41
End: 2021-10-13
Attending: HOSPITALIST
Payer: COMMERCIAL

## 2021-10-13 DIAGNOSIS — C50.211 MALIGNANT NEOPLASM OF UPPER-INNER QUADRANT OF RIGHT BREAST IN FEMALE, ESTROGEN RECEPTOR POSITIVE (H): Primary | ICD-10-CM

## 2021-10-13 DIAGNOSIS — Z17.0 MALIGNANT NEOPLASM OF UPPER-INNER QUADRANT OF RIGHT BREAST IN FEMALE, ESTROGEN RECEPTOR POSITIVE (H): Primary | ICD-10-CM

## 2021-10-13 PROCEDURE — 99213 OFFICE O/P EST LOW 20 MIN: CPT | Mod: 95 | Performed by: HOSPITALIST

## 2021-10-13 NOTE — LETTER
10/13/2021         RE: Leah Rangel  [address]  [place] [address]        Dear Colleague,    Thank you for referring your patient, Leah Rangel, to the [facility]. Please see a copy of my visit note below.    Palliative Care Outpatient Clinic Progress Note    Patient Name: Leah Rangel is being evaluated via a billable video visit.    Primary Provider:  Ge Wiggins  Primary Oncologist: Dr Gerardo  Last seen by palliative care: myself, 8/9/21      Chief Complaint: doing well overall, working towards integrating her experience with cancer and rebuilding her life again    Medical History/Summary:  - R breast cancer (invasive ductal carcinomatriple positive) diagnosed June 2020 after patient noticed a mass in her breast               - completed neoadjuvant TCHP (much issues with fatigue, nausea, weight loss, diarrhea, so stopped one cycle early)              - s/p b/l mastectomy Nov 2020 followed by adjuvant trastruzumab/pertuzumab and tamoxifen. Now on tamoxifen alone since August 2021      Interim History:  At the last visit we discussed aches/joint pains (ibuprofen prn, recert for med cannabis); dysphagia (further workup with SLP); coping (appointment with Estrellita Taylor, [facility])    Patient is on the call by herself    She is doing well overall. Since her last visit she has improved remarkably, no issues with swallowing anymore, her aches have resolved as well. She still has some fatigue, which has improved a lot, but prevents her from engaging in all activities she'd like to participate in.       Coping:  She eloquently describes her experience of completing cancer treatments and now figuring out how to live as a cancer survivor. She would like to connect with others who are in similar situations of balancing survivorship of a serious illness with the active life of a young, active professional.    Social History:  Pertinent changes to social  history/social situation since last visit: no changes    Functional Status:  0 (Fully active, able to carry on all activities without restriction)        Impression & Recommendations & Counseliny/o woman with breast cancer s/p completion of treatment    No changes in management today      Return to clinic as needed    Data / Chart Review:    Review of Systems:   ROS: 10 point ROS neg other than the symptoms noted above in the HPI and pertinents here.         Physical Exam:   Physical Exam:  Vital Signs not checked, virtual visit    CONSTIT: awake, appears comfortable  EENT: MMM, EOMI, no icterus  RESP: reg, nl effort, no cough  MSK: moves x4, DA  SKIN: no rash, no obvious lesions  NEURO: alert, oriented x3  PSYCH: appropriate affect, memory and thought process intact    The rest of a comprehensive physical examination is deferred  due to public health emergency video visit restrictions.        Current pertinent medications:    Lorazepam 0.5mg prn       : ok    Opioid safety assessment:   n/a     No pertinent allergies      Lab and imaging data reviewed:  Comments:       Video-Visit Details    Type of service:  Video Visit    Physician has received verbal consent for a Video Visit from the patient? Yes    Video Start Time:     Video End Time (time video stopped): 1528    Originating Location (pt. Location): Home    Distant Location (provider location):  [facility]     Mode of Communication:  Video Conference via [facility]    Deborah Hook MD  Palliative Medicine  Pager [phone]      Leah is a 41 year old who is being evaluated via a billable video visit.      Leah Rangel stated she is currently in the state [facility] for her visit today.    How would you like to obtain your AVS? MyChart  If the video visit is dropped, the invitation should be resent by: Text to cell phone: [phone]  Will anyone else be joining your video visit? No    Video Start  Time:   Video-Visit Details    Type of service:  Video Visit    Video End Time:    Originating Location (pt. Location): Home    Distant Location (provider location):  [facility]     Platform used for Video Visit:      Michelle Meyer Virtual Visit Facilitator        Again, thank you for allowing me to participate in the care of your patient.        Sincerely,        Deborah Hook MD

## 2021-10-13 NOTE — PROGRESS NOTES
Palliative Care Outpatient Clinic Progress Note    Patient Name: Leah Rangel is being evaluated via a billable video visit.    Primary Provider:  Ge Wiggins  Primary Oncologist: Dr Gerardo  Last seen by palliative care: myself, 21      Chief Complaint: doing well overall, working towards integrating her experience with cancer and rebuilding her life again    Medical History/Summary:  - R breast cancer (invasive ductal carcinomatriple positive) diagnosed 2020 after patient noticed a mass in her breast               - completed neoadjuvant TCHP (much issues with fatigue, nausea, weight loss, diarrhea, so stopped one cycle early)              - s/p b/l mastectomy 2020 followed by adjuvant trastruzumab/pertuzumab and tamoxifen. Now on tamoxifen alone since 2021      Interim History:  At the last visit we discussed aches/joint pains (ibuprofen prn, recert for med cannabis); dysphagia (further workup with SLP); coping (appointment with Estrellita Taylor, [facility])    Patient is on the call by herself    She is doing well overall. Since her last visit she has improved remarkably, no issues with swallowing anymore, her aches have resolved as well. She still has some fatigue, which has improved a lot, but prevents her from engaging in all activities she'd like to participate in.       Coping:  She eloquently describes her experience of completing cancer treatments and now figuring out how to live as a cancer survivor. She would like to connect with others who are in similar situations of balancing survivorship of a serious illness with the active life of a young, active professional.    Social History:  Pertinent changes to social history/social situation since last visit: no changes    Functional Status:  0 (Fully active, able to carry on all activities without restriction)        Impression & Recommendations & Counseliny/o woman with breast cancer s/p completion of treatment    No  changes in management today      Return to clinic as needed    Data / Chart Review:    Review of Systems:   ROS: 10 point ROS neg other than the symptoms noted above in the HPI and pertinents here.         Physical Exam:   Physical Exam:  Vital Signs not checked, virtual visit    CONSTIT: awake, appears comfortable  EENT: MMM, EOMI, no icterus  RESP: reg, nl effort, no cough  MSK: moves x4, DA  SKIN: no rash, no obvious lesions  NEURO: alert, oriented x3  PSYCH: appropriate affect, memory and thought process intact    The rest of a comprehensive physical examination is deferred  due to public [facility] emergency video visit restrictions.        Current pertinent medications:    Lorazepam 0.5mg prn       : ok    Opioid safety assessment:   n/a     No pertinent allergies      Lab and imaging data reviewed:  Comments:       Video-Visit Details    Type of service:  Video Visit    Physician has received verbal consent for a Video Visit from the patient? Yes    Video Start Time: 1513    Video End Time (time video stopped): 1528    Originating Location (pt. Location): Home    Distant Location (provider location):  [facility]     Mode of Communication:  Video Conference via Emil Hook MD  Palliative Medicine  Pager [phone]

## 2021-10-13 NOTE — PROGRESS NOTES
Leah is a 41 year old who is being evaluated via a billable video visit.      Leah Rangel stated she is currently in the state [facility] for her visit today.    How would you like to obtain your AVS? MyChart  If the video visit is dropped, the invitation should be resent by: Text to cell phone: [phone]  Will anyone else be joining your video visit? No    Video Start Time:   Video-Visit Details    Type of service:  Video Visit    Video End Time:    Originating Location (pt. Location): Home    Distant Location (provider location):  [facility]     Platform used for Video Visit:      Michelle Meyer, Virtual Visit Facilitator

## 2021-10-13 NOTE — NURSING NOTE
Leah Rangel verified meds and allergies are correct via patients echeck in.  No changes at this time.    Michelle Meyer, Virtual Facilitator

## 2021-11-04 ENCOUNTER — VIRTUAL VISIT (OUTPATIENT)
Dept: ONCOLOGY | Facility: CLINIC | Age: 41
End: 2021-11-04
Attending: INTERNAL MEDICINE
Payer: COMMERCIAL

## 2021-11-04 DIAGNOSIS — Z17.0 MALIGNANT NEOPLASM OF UPPER-INNER QUADRANT OF RIGHT BREAST IN FEMALE, ESTROGEN RECEPTOR POSITIVE (H): Primary | ICD-10-CM

## 2021-11-04 DIAGNOSIS — R91.8 PULMONARY NODULES: ICD-10-CM

## 2021-11-04 DIAGNOSIS — C50.211 MALIGNANT NEOPLASM OF UPPER-INNER QUADRANT OF RIGHT BREAST IN FEMALE, ESTROGEN RECEPTOR POSITIVE (H): Primary | ICD-10-CM

## 2021-11-04 DIAGNOSIS — Z91.89 AT RISK FOR CARDIOMYOPATHY: ICD-10-CM

## 2021-11-04 DIAGNOSIS — R13.10 DYSPHAGIA, UNSPECIFIED TYPE: ICD-10-CM

## 2021-11-04 PROCEDURE — 99214 OFFICE O/P EST MOD 30 MIN: CPT | Mod: 95 | Performed by: INTERNAL MEDICINE

## 2021-11-04 PROCEDURE — 999N001193 HC VIDEO/TELEPHONE VISIT; NO CHARGE

## 2021-11-04 NOTE — LETTER
11/4/2021         RE: Leah Rangel  [address] [place] [address]        Dear Colleague,    Thank you for referring your patient, Leah Rangel, to the [facility]. Please see a copy of my visit note below.    Leah is a 41 year old who is being evaluated via a billable video visit.      How would you like to obtain your AVS? MyChart  If the video visit is dropped, the invitation should be resent by: Text to cell phone: [phone]  Will anyone else be joining your video visit? No       LIZ Kelley      Video Start Time: 830  Video-Visit Details    Type of service:  Video Visit    Video End Time:900    Originating Location (pt. Location): Home    Distant Location (provider location):  [facility]     Platform used for Video Visit: [employer]     Reason for Visit: follow-up invasive ductal carcinoma of the R breast, grade 3, ER positive, [place] positive, HER-2 positive 3+ by IHC    Oncology HPI:   Leah Rangel is a  41 year old, premenopausal female, diagnosed with R breast cancer, T2N0 right breast cancer.      Leah reports that she has a history of bilateral implants.  She noticed a palpable mass involving her right breast in the upper inner quadrant in early 06/2020.  At that time, she went in for an evaluation with a mammogram and ultrasound.  Mammogram on 06/11/2020 revealed a 7 mm mass at the 1 o'clock position approximately 5 cm from the nipple in the upper inner quadrant of the right breast.  There was a second area measuring 6 x 5 x 4 mm at the 1 o'clock position 6 cm from the nipple.  In the left breast, there was an area of microcalcifications.  She was referred for a right breast biopsy.  Both areas at the 1 o'clock position were biopsied.  No lymph nodes were appreciated.  Both of these biopsies revealed an invasive ductal carcinoma, grade 3, ER positive, KY positive, HER-2 positive, 3+ by IHC.  She  was considered to have a  T1b multifocal N1 breast cancer.  On further review, it appeared to be T2Nx. She was subsequently referred for a breast MRI.  The BMRI done on 7/10/20 showed a 4.5 x 2.4 x 3 cm mass in the R upper, inner breast. She underwent a PET/CT 7/16 and started neoadjuvant treatment with Docetaxel/Carboplatin/Trastuzumab/Pertuzumab (TCHP) 7/17/20.  Lymph nodes appeared normal on MRI and PET.     She completed five cycles of TCHP 10/9/20, had a sixth cycle with just herceptin and perjeta on 10/30/20.  She had significant problems with fatigue, nausea, weight loss and diarrhea.  As a result, her treatment was stopped one cycle early.     She underwent bilateral mastectomies and sentinel lymph node biopsy on 11/11/20 with Dr Marquez Vargas and Dr Piotr Ochoa. She had a pathologic CR. She was continued on adjuvant trastuzumab/pertuzumab on 11/23/20 with a plan for one year of adjuvant therapy. She was also started on adjuvant tamoxifen.       Interval history: Leah is feeling well today.     She is feeling much more like herself.  Her hair is coming in.  She is still having difficulty with her eyelashes.    She has noticed more bruising than she remembers prior to cancer.    She has more energy.       Her arthralgias are much better.    She is increasing her protein intake, and watching her nutrition.    She had a menstrual period in June and in August.    Occasional hot flashes.  Overall the hot flashes are better than when she was on chemotherapy.    She had her covid vaccines x 3.       ROS: 10 point ROS neg other than the symptoms noted above in the HPI.      Current Outpatient Medications   Medication Sig Dispense Refill     bimatoprost (LATISSE) 0.03 % external opthalmic solution Apply 1 drop topically At Bedtime 5 mL 1     diphenhydrAMINE-acetaminophen (TYLENOL PM)  MG tablet Take 1 tablet by mouth nightly as needed for sleep       lidocaine (XYLOCAINE) 2 % external gel Apply topically 3 times daily as needed for  moderate pain 30 mL 1     lidocaine-prilocaine (EMLA) 2.5-2.5 % external cream Apply topically as needed for moderate pain Apply to PORT site 45 minutes prior to procedure.  Cover with a non-absorbent dressing. 30 g 4     LORazepam (ATIVAN) 0.5 MG tablet Take 1 tablet (0.5 mg) by mouth every 4 hours as needed (Anxiety, Nausea/Vomiting or Sleep) 10 tablet 0     magic mouthwash suspension, diphenhydrAMINE, lidocaine, aluminum-magnesium & simethicone, (FIRST-MOUTHWASH BLM) compounding kit Swish and swallow 5-10 mLs in mouth every 6 hours as needed for mouth sores (Patient not taking: Reported on 6/4/2021) 237 mL 1     order for DME Equipment: Wig  Diagnosis: cranial alopecia 1 each 0     prochlorperazine (COMPAZINE) 10 MG tablet Take 1 tablet (10 mg) by mouth every 6 hours as needed (Nausea/Vomiting) 30 tablet 3     tamoxifen (NOLVADEX) 20 MG tablet Take 1 tablet (20 mg) by mouth daily 90 tablet 3     Vaginal Lubricant (REPLENS) GEL Apply vaginally as needed 35 g 1          Allergies   Allergen Reactions     Codeine Other (See Comments)     Got hyper         Video physical exam  General: Patient appears well in no acute distress.   Skin: No visualized rash or lesions on visualized skin. Scalp shows short hair.  Eyes: EOMI, no erythema, sclera icterus or discharge noted  Resp: Appears to be breathing comfortably without accessory muscle usage, speaking in full sentences, no cough  MSK: Appears to have normal range of motion based on visualized movements  Neurologic: No apparent tremors, facial movements symmetric  Psych: affect pleasant, engaged, alert and oriented    The rest of a comprehensive physical examination is deferred due to PHE (public health emergency) video restrictions       Wt Readings from Last 4 Encounters:   09/02/21 49 kg (108 lb)   08/27/21 48.9 kg (107 lb 12.8 oz)   08/06/21 47.7 kg (105 lb 1.6 oz)   07/16/21 48.3 kg (106 lb 6.4 oz)         Labs: n/a    Imaging: last echo 6/3/21 normal  EF.    Impression/plan:   ER/GA +, HER-2 positive R upper/inner quadrant breast cancer. Clinically T2N0. Treated with neoadjuvant TCHP x 6 cycles and herceptin/perjeta alone with cycle 6. S/P bilateral mastecomies and sentinel LN bx on 11/11/20 with a pathologic CR.    Breast cancer - she completed adjuvant herceptin in 8/27/21.  She is on tamoxifen and tolerating this well.   We discussed continuing this for five years, perhaps longer.      She will return in 3 months and 6 months.    Pulm nodule - 3 mm on baseline CT.  This was repeated and was stable.       Cardiac monitoring while on HER2 therapy  -echo every 3 months, last done 6/3/21 WNL.     Dysphagia - normal swallow evaluation.    Fatigue  -much better since stopping chemotherapy.         Lucita Gerardo MD

## 2021-11-04 NOTE — PROGRESS NOTES
Leah is a 41 year old who is being evaluated via a billable video visit.      How would you like to obtain your AVS? MyChart  If the video visit is dropped, the invitation should be resent by: Text to cell phone: [phone]  Will anyone else be joining your video visit? LIZ Marie      Video Start Time: 830  Video-Visit Details    Type of service:  Video Visit    Video End Time:900    Originating Location (pt. Location): Home    Distant Location (provider location):  [facility]     Platform used for Video Visit: [employer]     Reason for Visit: follow-up invasive ductal carcinoma of the R breast, grade 3, ER positive, [place] positive, HER-2 positive 3+ by IHC    Oncology HPI:   Leah Rangel is a  41 year old, premenopausal female, diagnosed with R breast cancer, T2N0 right breast cancer.      Leah reports that she has a history of bilateral implants.  She noticed a palpable mass involving her right breast in the upper inner quadrant in early 06/2020.  At that time, she went in for an evaluation with a mammogram and ultrasound.  Mammogram on 06/11/2020 revealed a 7 mm mass at the 1 o'clock position approximately 5 cm from the nipple in the upper inner quadrant of the right breast.  There was a second area measuring 6 x 5 x 4 mm at the 1 o'clock position 6 cm from the nipple.  In the left breast, there was an area of microcalcifications.  She was referred for a right breast biopsy.  Both areas at the 1 o'clock position were biopsied.  No lymph nodes were appreciated.  Both of these biopsies revealed an invasive ductal carcinoma, grade 3, ER positive, AZ positive, HER-2 positive, 3+ by IHC.  She  was considered to have a T1b multifocal N1 breast cancer.  On further review, it appeared to be T2Nx. She was subsequently referred for a breast MRI.  The BMRI done on 7/10/20 showed a 4.5 x 2.4 x 3 cm mass in the R upper, inner breast. She underwent a PET/CT 7/16 and  started neoadjuvant treatment with Docetaxel/Carboplatin/Trastuzumab/Pertuzumab (TCHP) 7/17/20.  Lymph nodes appeared normal on MRI and PET.     She completed five cycles of TCHP 10/9/20, had a sixth cycle with just herceptin and perjeta on 10/30/20.  She had significant problems with fatigue, nausea, weight loss and diarrhea.  As a result, her treatment was stopped one cycle early.     She underwent bilateral mastectomies and sentinel lymph node biopsy on 11/11/20 with Dr Marquez Vargas and Dr Piotr Ochoa. She had a pathologic CR. She was continued on adjuvant trastuzumab/pertuzumab on 11/23/20 with a plan for one year of adjuvant therapy. She was also started on adjuvant tamoxifen.       Interval history: Leah is feeling well today.     She is feeling much more like herself.  Her hair is coming in.  She is still having difficulty with her eyelashes.    She has noticed more bruising than she remembers prior to cancer.    She has more energy.       Her arthralgias are much better.    She is increasing her protein intake, and watching her nutrition.    She had a menstrual period in June and in August.    Occasional hot flashes.  Overall the hot flashes are better than when she was on chemotherapy.    She had her covid vaccines x 3.       ROS: 10 point ROS neg other than the symptoms noted above in the HPI.      Current Outpatient Medications   Medication Sig Dispense Refill     bimatoprost (LATISSE) 0.03 % external opthalmic solution Apply 1 drop topically At Bedtime 5 mL 1     diphenhydrAMINE-acetaminophen (TYLENOL PM)  MG tablet Take 1 tablet by mouth nightly as needed for sleep       lidocaine (XYLOCAINE) 2 % external gel Apply topically 3 times daily as needed for moderate pain 30 mL 1     lidocaine-prilocaine (EMLA) 2.5-2.5 % external cream Apply topically as needed for moderate pain Apply to PORT site 45 minutes prior to procedure.  Cover with a non-absorbent dressing. 30 g 4     LORazepam (ATIVAN) 0.5  MG tablet Take 1 tablet (0.5 mg) by mouth every 4 hours as needed (Anxiety, Nausea/Vomiting or Sleep) 10 tablet 0     magic mouthwash suspension, diphenhydrAMINE, lidocaine, aluminum-magnesium & simethicone, (FIRST-MOUTHWASH BLM) compounding kit Swish and swallow 5-10 mLs in mouth every 6 hours as needed for mouth sores (Patient not taking: Reported on 6/4/2021) 237 mL 1     order for DME Equipment: Wig  Diagnosis: cranial alopecia 1 each 0     prochlorperazine (COMPAZINE) 10 MG tablet Take 1 tablet (10 mg) by mouth every 6 hours as needed (Nausea/Vomiting) 30 tablet 3     tamoxifen (NOLVADEX) 20 MG tablet Take 1 tablet (20 mg) by mouth daily 90 tablet 3     Vaginal Lubricant (REPLENS) GEL Apply vaginally as needed 35 g 1          Allergies   Allergen Reactions     Codeine Other (See Comments)     Got hyper         Video physical exam  General: Patient appears well in no acute distress.   Skin: No visualized rash or lesions on visualized skin. Scalp shows short hair.  Eyes: EOMI, no erythema, sclera icterus or discharge noted  Resp: Appears to be breathing comfortably without accessory muscle usage, speaking in full sentences, no cough  MSK: Appears to have normal range of motion based on visualized movements  Neurologic: No apparent tremors, facial movements symmetric  Psych: affect pleasant, engaged, alert and oriented    The rest of a comprehensive physical examination is deferred due to PHE (public health emergency) video restrictions       Wt Readings from Last 4 Encounters:   09/02/21 49 kg (108 lb)   08/27/21 48.9 kg (107 lb 12.8 oz)   08/06/21 47.7 kg (105 lb 1.6 oz)   07/16/21 48.3 kg (106 lb 6.4 oz)         Labs: n/a    Imaging: last echo 6/3/21 normal EF.    Impression/plan:   ER/WV +, HER-2 positive R upper/inner quadrant breast cancer. Clinically T2N0. Treated with neoadjuvant TCHP x 6 cycles and herceptin/perjeta alone with cycle 6. S/P bilateral mastecomies and sentinel LN bx on 11/11/20 with a  pathologic CR.    Breast cancer - she completed adjuvant herceptin in 8/27/21.  She is on tamoxifen and tolerating this well.   We discussed continuing this for five years, perhaps longer.      She will return in 3 months and 6 months.    Pulm nodule - 3 mm on baseline CT.  This was repeated and was stable.       Cardiac monitoring while on HER2 therapy  -echo every 3 months, last done 6/3/21 WNL.     Dysphagia - normal swallow evaluation.    Fatigue  -much better since stopping chemotherapy.         Lucita Gerardo MD

## 2021-11-30 ENCOUNTER — MYC MEDICAL ADVICE (OUTPATIENT)
Dept: ONCOLOGY | Facility: CLINIC | Age: 41
End: 2021-11-30
Payer: COMMERCIAL

## 2021-11-30 DIAGNOSIS — C50.211 MALIGNANT NEOPLASM OF UPPER-INNER QUADRANT OF RIGHT BREAST IN FEMALE, ESTROGEN RECEPTOR POSITIVE (H): ICD-10-CM

## 2021-11-30 DIAGNOSIS — Z17.0 MALIGNANT NEOPLASM OF UPPER-INNER QUADRANT OF RIGHT BREAST IN FEMALE, ESTROGEN RECEPTOR POSITIVE (H): ICD-10-CM

## 2021-12-01 RX ORDER — TAMOXIFEN CITRATE 20 MG/1
20 TABLET ORAL DAILY
Qty: 90 TABLET | Refills: 3 | Status: SHIPPED | OUTPATIENT
Start: 2021-12-01 | End: 2022-11-30

## 2022-01-09 ENCOUNTER — HEALTH MAINTENANCE LETTER (OUTPATIENT)
Age: 42
End: 2022-01-09

## 2022-02-03 DIAGNOSIS — L65.9 HAIR LOSS: ICD-10-CM

## 2022-02-03 DIAGNOSIS — Z17.0 MALIGNANT NEOPLASM OF UPPER-INNER QUADRANT OF RIGHT BREAST IN FEMALE, ESTROGEN RECEPTOR POSITIVE (H): ICD-10-CM

## 2022-02-03 DIAGNOSIS — C50.211 MALIGNANT NEOPLASM OF UPPER-INNER QUADRANT OF RIGHT BREAST IN FEMALE, ESTROGEN RECEPTOR POSITIVE (H): ICD-10-CM

## 2022-02-03 RX ORDER — BIMATOPROST 3 UG/ML
1 SOLUTION TOPICAL AT BEDTIME
Qty: 5 ML | Refills: 1 | Status: SHIPPED | OUTPATIENT
Start: 2022-02-03

## 2022-02-03 NOTE — TELEPHONE ENCOUNTER
Medication:Bimatoprost 0.003%  Last prescribing provider:Dr. Gerardo 6/24/21    Last clinic visit date: 11/4/21 Dr. Gerardo  Recommendations for requested medication: n/a    Any missed appointments or no-shows since last clinic visit?: none recently  Next clinic visit date: 2/7/22 Laney Melgar PA-C    Any other pertinent information:  Routing to Dr. Gerardo and Laney

## 2022-04-16 ENCOUNTER — MYC MEDICAL ADVICE (OUTPATIENT)
Dept: ONCOLOGY | Facility: CLINIC | Age: 42
End: 2022-04-16
Payer: COMMERCIAL

## 2022-04-18 NOTE — TELEPHONE ENCOUNTER
[facility] Triage    MyChart Message: Pain in breast similar to when she was diagnosed with breast cancer.  It is in the left over tissues.    Pain in the right breast - wakes her up in the middle of night - 4 weeks of pain, uses IB  Lumps depending on where she feels it  Swelling  - unsure if manipulation with pain and trying to feel for lumps  Pet Scan was negative per patient    Patient is concerned, would like to know if she can get in earlier with [facility] (May 2nd appt on calendar) or if she should go to her PCP.    Please advise    Routing to Rosita Nicholas and Dr. Gerardo

## 2022-04-19 DIAGNOSIS — Z85.3 HX: BREAST CANCER: ICD-10-CM

## 2022-04-19 DIAGNOSIS — N64.4 BREAST PAIN, RIGHT: Primary | ICD-10-CM

## 2022-04-21 ENCOUNTER — ANCILLARY PROCEDURE (OUTPATIENT)
Dept: MAMMOGRAPHY | Facility: CLINIC | Age: 42
End: 2022-04-21
Attending: INTERNAL MEDICINE
Payer: COMMERCIAL

## 2022-04-21 DIAGNOSIS — Z85.3 HX: BREAST CANCER: ICD-10-CM

## 2022-04-21 DIAGNOSIS — N64.4 BREAST PAIN, RIGHT: ICD-10-CM

## 2022-04-21 PROCEDURE — 77065 DX MAMMO INCL CAD UNI: CPT | Mod: RT | Performed by: RADIOLOGY

## 2022-05-02 ENCOUNTER — ONCOLOGY VISIT (OUTPATIENT)
Dept: ONCOLOGY | Facility: CLINIC | Age: 42
End: 2022-05-02
Attending: INTERNAL MEDICINE
Payer: COMMERCIAL

## 2022-05-02 VITALS
BODY MASS INDEX: 19.89 KG/M2 | OXYGEN SATURATION: 97 % | DIASTOLIC BLOOD PRESSURE: 74 MMHG | HEART RATE: 95 BPM | SYSTOLIC BLOOD PRESSURE: 121 MMHG | TEMPERATURE: 98.9 F | HEIGHT: 64 IN | WEIGHT: 116.5 LBS

## 2022-05-02 DIAGNOSIS — Z91.89 AT RISK FOR CARDIOMYOPATHY: ICD-10-CM

## 2022-05-02 DIAGNOSIS — R91.8 PULMONARY NODULES: ICD-10-CM

## 2022-05-02 DIAGNOSIS — Z85.3 HISTORY OF INVASIVE BREAST CANCER: Primary | ICD-10-CM

## 2022-05-02 PROCEDURE — 99214 OFFICE O/P EST MOD 30 MIN: CPT | Performed by: INTERNAL MEDICINE

## 2022-05-02 PROCEDURE — G0463 HOSPITAL OUTPT CLINIC VISIT: HCPCS

## 2022-05-02 ASSESSMENT — PAIN SCALES - GENERAL: PAINLEVEL: NO PAIN (0)

## 2022-05-02 NOTE — LETTER
5/2/2022         RE: Leah Rangel  [address] [place] [address]        Dear Colleague,    Thank you for referring your patient, Leah Rangel, to the [facility]. Please see a copy of my visit note below.       Reason for Visit: follow-up invasive ductal carcinoma of the R breast, grade 3, ER positive, [place] positive, HER-2 positive 3+ by IHC    Oncology HPI:   Leah Rangel is a  42 year old, premenopausal female, diagnosed with R breast cancer, T2N0 right breast cancer.      Leah reports that she has a history of bilateral implants.  She noticed a palpable mass involving her right breast in the upper inner quadrant in early 06/2020.  At that time, she went in for an evaluation with a mammogram and ultrasound.  Mammogram on 06/11/2020 revealed a 7 mm mass at the 1 o'clock position approximately 5 cm from the nipple in the upper inner quadrant of the right breast.  There was a second area measuring 6 x 5 x 4 mm at the 1 o'clock position 6 cm from the nipple.  In the left breast, there was an area of microcalcifications.  She was referred for a right breast biopsy.  Both areas at the 1 o'clock position were biopsied.  No lymph nodes were appreciated.  Both of these biopsies revealed an invasive ductal carcinoma, grade 3, ER positive, [place] positive, HER-2 positive, 3+ by IHC.  She  was considered to have a T1b multifocal N1 breast cancer.  On further review, it appeared to be T2Nx. She was subsequently referred for a breast MRI.  The BMRI done on 7/10/20 showed a 4.5 x 2.4 x 3 cm mass in the R upper, inner breast. She underwent a PET/CT 7/16 and started neoadjuvant treatment with Docetaxel/Carboplatin/Trastuzumab/Pertuzumab (TCHP) 7/17/20.  Lymph nodes appeared normal on MRI and PET.     She completed five cycles of TCHP 10/9/20, had a sixth cycle with just herceptin and perjeta on 10/30/20.  She had significant problems with fatigue, nausea, weight loss and diarrhea.  As a  result, her treatment was stopped one cycle early.     She underwent bilateral mastectomies and sentinel lymph node biopsy on 11/11/20 with Dr Marquez Vargas and Dr Piotr Ochoa. She had a pathologic CR. She was continued on adjuvant trastuzumab/pertuzumab on 11/23/20 with a plan for one year of adjuvant therapy. She was also started on adjuvant tamoxifen.     Interval history: Leah is feeling well today.  She called in a few weeks ago complaining of swelling and edema in her right breast.  She came in for imaging and evaluation.  Workup was negative.      She is feeling much more like herself.       She has more energy.         She is on tamoxifen and tolerating this therapy well.    ROS: 10 point ROS neg other than the symptoms noted above in the HPI.      Current Outpatient Medications   Medication Sig Dispense Refill     bimatoprost (LATISSE) 0.03 % external opthalmic solution Apply 1 drop topically At Bedtime 5 mL 1     diphenhydrAMINE-acetaminophen (TYLENOL PM)  MG tablet Take 1 tablet by mouth nightly as needed for sleep       lidocaine (XYLOCAINE) 2 % external gel Apply topically 3 times daily as needed for moderate pain 30 mL 1     lidocaine-prilocaine (EMLA) 2.5-2.5 % external cream Apply topically as needed for moderate pain Apply to PORT site 45 minutes prior to procedure.  Cover with a non-absorbent dressing. 30 g 4     LORazepam (ATIVAN) 0.5 MG tablet Take 1 tablet (0.5 mg) by mouth every 4 hours as needed (Anxiety, Nausea/Vomiting or Sleep) 10 tablet 0     order for DME Equipment: Wig  Diagnosis: cranial alopecia 1 each 0     prochlorperazine (COMPAZINE) 10 MG tablet Take 1 tablet (10 mg) by mouth every 6 hours as needed (Nausea/Vomiting) 30 tablet 3     tamoxifen (NOLVADEX) 20 MG tablet Take 1 tablet (20 mg) by mouth daily 90 tablet 3     Vaginal Lubricant (REPLENS) GEL Apply vaginally as needed 35 g 1     magic mouthwash suspension, diphenhydrAMINE, lidocaine, aluminum-magnesium &  "simethicone, (FIRST-MOUTHWASH BLM) compounding kit Swish and swallow 5-10 mLs in mouth every 6 hours as needed for mouth sores (Patient not taking: Reported on 6/4/2021) 237 mL 1          Allergies   Allergen Reactions     Codeine Other (See Comments)     Got hyper         PE: /74 (BP Location: Right arm, Patient Position: Sitting, Cuff Size: Adult Regular)   Pulse 95   Temp 98.9  F (37.2  C) (Oral)   Ht 1.626 m (5' 4\")   Wt 52.8 kg (116 lb 8 oz)   SpO2 97%   BMI 20.00 kg/m      General: Patient appears well in no acute distress.   Skin: No visualized rash or lesions on visualized skin. Scalp shows short hair.  Eyes: EOMI, no erythema, sclera icterus or discharge noted  Resp: Appears to be breathing comfortably without accessory muscle usage, speaking in full sentences, no cough  MSK: Appears to have normal range of motion based on visualized movements  Neurologic: No apparent tremors, facial movements symmetric  Psych: affect pleasant, engaged, alert and oriented  Breast : s/p bilateral mastectomy with reconstruction, no nodules or masses, no axillary adenopathy          Wt Readings from Last 4 Encounters:   05/02/22 52.8 kg (116 lb 8 oz)   09/02/21 49 kg (108 lb)   08/27/21 48.9 kg (107 lb 12.8 oz)   08/06/21 47.7 kg (105 lb 1.6 oz)         Labs: n/a    Imaging: last echo 6/3/21 normal EF.    Reviewed breast imaging and ultrasound    Impression/plan:   ER/IA +, HER-2 positive R upper/inner quadrant breast cancer. Clinically T2N0. Treated with neoadjuvant TCHP x 6 cycles and herceptin/perjeta alone with cycle 6. S/P bilateral mastecomies and sentinel LN bx on 11/11/20 with a pathologic CR.    Breast cancer - she completed adjuvant herceptin in 8/27/21.  She is on tamoxifen and tolerating this well.   We discussed continuing this for five years, perhaps longer.      We reviewed all of her imaging and clinical exam; this does not demonstrate any concerning findings for malignancy.    She will return in 3 " months when she has a follow up ultrasound scheduled.    Pulm nodule - 3 mm on baseline CT.  This was repeated and was stable.       Cardiac monitoring while on HER2 therapy  -echo every 3 months, last done 6/3/21 WNL.        Lucita Gerardo MD     > 30 min were spent in reviewing records, counseling and coordinating care      Again, thank you for allowing me to participate in the care of your patient.        Sincerely,        Lucita Gerardo MD

## 2022-05-02 NOTE — NURSING NOTE
"Oncology Rooming Note    May 2, 2022 1:49 PM   Leah Rangel is a 42 year old female who presents for:    Chief Complaint   Patient presents with     Oncology Clinic Visit     Breast Cancer      Initial Vitals: /74 (BP Location: Right arm, Patient Position: Sitting, Cuff Size: Adult Regular)   Pulse 95   Temp 98.9  F (37.2  C) (Oral)   Ht 1.626 m (5' 4\")   Wt 52.8 kg (116 lb 8 oz)   SpO2 97%   BMI 20.00 kg/m   Estimated body mass index is 20 kg/m  as calculated from the following:    Height as of this encounter: 1.626 m (5' 4\").    Weight as of this encounter: 52.8 kg (116 lb 8 oz). Body surface area is 1.54 meters squared.  No Pain (0) Comment: Data Unavailable   No LMP recorded. (Menstrual status: Chemotherapy).  Allergies reviewed: Yes  Medications reviewed: Yes    Medications: Medication refills not needed today.  Pharmacy name entered into [facility]:    [id number] [place] [facility] [phone] AT [facility] [address]   [facility] [place], [place] -  [facility] [phone]    Clinical concerns: None     Roger Narayan            "

## 2022-05-04 NOTE — PROGRESS NOTES
Reason for Visit: follow-up invasive ductal carcinoma of the R breast, grade 3, ER positive, HI positive, HER-2 positive 3+ by IHC    Oncology HPI:   Leah Rangel is a  42 year old, premenopausal female, diagnosed with R breast cancer, T2N0 right breast cancer.      Leah reports that she has a history of bilateral implants.  She noticed a palpable mass involving her right breast in the upper inner quadrant in early 06/2020.  At that time, she went in for an evaluation with a mammogram and ultrasound.  Mammogram on 06/11/2020 revealed a 7 mm mass at the 1 o'clock position approximately 5 cm from the nipple in the upper inner quadrant of the right breast.  There was a second area measuring 6 x 5 x 4 mm at the 1 o'clock position 6 cm from the nipple.  In the left breast, there was an area of microcalcifications.  She was referred for a right breast biopsy.  Both areas at the 1 o'clock position were biopsied.  No lymph nodes were appreciated.  Both of these biopsies revealed an invasive ductal carcinoma, grade 3, ER positive, HI positive, HER-2 positive, 3+ by IHC.  She  was considered to have a T1b multifocal N1 breast cancer.  On further review, it appeared to be T2Nx. She was subsequently referred for a breast MRI.  The BMRI done on 7/10/20 showed a 4.5 x 2.4 x 3 cm mass in the R upper, inner breast. She underwent a PET/CT 7/16 and started neoadjuvant treatment with Docetaxel/Carboplatin/Trastuzumab/Pertuzumab (TCHP) 7/17/20.  Lymph nodes appeared normal on MRI and PET.     She completed five cycles of TCHP 10/9/20, had a sixth cycle with just herceptin and perjeta on 10/30/20.  She had significant problems with fatigue, nausea, weight loss and diarrhea.  As a result, her treatment was stopped one cycle early.     She underwent bilateral mastectomies and sentinel lymph node biopsy on 11/11/20 with Dr Marquez Vargas and Dr Piotr Ochoa. She had a pathologic CR. She was continued on adjuvant trastuzumab/pertuzumab  on 11/23/20 with a plan for one year of adjuvant therapy. She was also started on adjuvant tamoxifen.     Interval history: Leah is feeling well today.  She called in a few weeks ago complaining of swelling and edema in her right breast.  She came in for imaging and evaluation.  Workup was negative.      She is feeling much more like herself.       She has more energy.         She is on tamoxifen and tolerating this therapy well.    ROS: 10 point ROS neg other than the symptoms noted above in the HPI.      Current Outpatient Medications   Medication Sig Dispense Refill     bimatoprost (LATISSE) 0.03 % external opthalmic solution Apply 1 drop topically At Bedtime 5 mL 1     diphenhydrAMINE-acetaminophen (TYLENOL PM)  MG tablet Take 1 tablet by mouth nightly as needed for sleep       lidocaine (XYLOCAINE) 2 % external gel Apply topically 3 times daily as needed for moderate pain 30 mL 1     lidocaine-prilocaine (EMLA) 2.5-2.5 % external cream Apply topically as needed for moderate pain Apply to PORT site 45 minutes prior to procedure.  Cover with a non-absorbent dressing. 30 g 4     LORazepam (ATIVAN) 0.5 MG tablet Take 1 tablet (0.5 mg) by mouth every 4 hours as needed (Anxiety, Nausea/Vomiting or Sleep) 10 tablet 0     order for DME Equipment: Wig  Diagnosis: cranial alopecia 1 each 0     prochlorperazine (COMPAZINE) 10 MG tablet Take 1 tablet (10 mg) by mouth every 6 hours as needed (Nausea/Vomiting) 30 tablet 3     tamoxifen (NOLVADEX) 20 MG tablet Take 1 tablet (20 mg) by mouth daily 90 tablet 3     Vaginal Lubricant (REPLENS) GEL Apply vaginally as needed 35 g 1     magic mouthwash suspension, diphenhydrAMINE, lidocaine, aluminum-magnesium & simethicone, (FIRST-MOUTHWASH BLM) compounding kit Swish and swallow 5-10 mLs in mouth every 6 hours as needed for mouth sores (Patient not taking: Reported on 6/4/2021) 237 mL 1          Allergies   Allergen Reactions     Codeine Other (See Comments)     Got hyper  "        PE: /74 (BP Location: Right arm, Patient Position: Sitting, Cuff Size: Adult Regular)   Pulse 95   Temp 98.9  F (37.2  C) (Oral)   Ht 1.626 m (5' 4\")   Wt 52.8 kg (116 lb 8 oz)   SpO2 97%   BMI 20.00 kg/m      General: Patient appears well in no acute distress.   Skin: No visualized rash or lesions on visualized skin. Scalp shows short hair.  Eyes: EOMI, no erythema, sclera icterus or discharge noted  Resp: Appears to be breathing comfortably without accessory muscle usage, speaking in full sentences, no cough  MSK: Appears to have normal range of motion based on visualized movements  Neurologic: No apparent tremors, facial movements symmetric  Psych: affect pleasant, engaged, alert and oriented  Breast : s/p bilateral mastectomy with reconstruction, no nodules or masses, no axillary adenopathy          Wt Readings from Last 4 Encounters:   05/02/22 52.8 kg (116 lb 8 oz)   09/02/21 49 kg (108 lb)   08/27/21 48.9 kg (107 lb 12.8 oz)   08/06/21 47.7 kg (105 lb 1.6 oz)         Labs: n/a    Imaging: last echo 6/3/21 normal EF.    Reviewed breast imaging and ultrasound    Impression/plan:   ER/OR +, HER-2 positive R upper/inner quadrant breast cancer. Clinically T2N0. Treated with neoadjuvant TCHP x 6 cycles and herceptin/perjeta alone with cycle 6. S/P bilateral mastecomies and sentinel LN bx on 11/11/20 with a pathologic CR.    Breast cancer - she completed adjuvant herceptin in 8/27/21.  She is on tamoxifen and tolerating this well.   We discussed continuing this for five years, perhaps longer.      We reviewed all of her imaging and clinical exam; this does not demonstrate any concerning findings for malignancy.    She will return in 3 months when she has a follow up ultrasound scheduled.    Pulm nodule - 3 mm on baseline CT.  This was repeated and was stable.       Cardiac monitoring while on HER2 therapy  -echo every 3 months, last done 6/3/21 WNL.        Lucita Gerardo MD     > 30 min were spent " in reviewing records, counseling and coordinating care

## 2022-07-18 ENCOUNTER — ANCILLARY PROCEDURE (OUTPATIENT)
Dept: MAMMOGRAPHY | Facility: CLINIC | Age: 42
End: 2022-07-18
Attending: INTERNAL MEDICINE
Payer: COMMERCIAL

## 2022-07-18 ENCOUNTER — ONCOLOGY VISIT (OUTPATIENT)
Dept: ONCOLOGY | Facility: CLINIC | Age: 42
End: 2022-07-18
Attending: INTERNAL MEDICINE
Payer: COMMERCIAL

## 2022-07-18 DIAGNOSIS — Z09 FOLLOW UP: ICD-10-CM

## 2022-07-18 DIAGNOSIS — Z85.3 HISTORY OF INVASIVE BREAST CANCER: Primary | ICD-10-CM

## 2022-07-18 DIAGNOSIS — Z85.3 HISTORY OF INVASIVE BREAST CANCER: ICD-10-CM

## 2022-07-18 PROCEDURE — G0463 HOSPITAL OUTPT CLINIC VISIT: HCPCS

## 2022-07-18 PROCEDURE — 76882 US LMTD JT/FCL EVL NVASC XTR: CPT | Mod: RT | Performed by: RADIOLOGY

## 2022-08-03 ENCOUNTER — TELEPHONE (OUTPATIENT)
Dept: ONCOLOGY | Facility: CLINIC | Age: 42
End: 2022-08-03

## 2022-08-03 NOTE — TELEPHONE ENCOUNTER
----- Message from Awilda Dutta RN sent at 8/3/2022  9:43 AM CDT -----  Regarding: Change rtn visit  Good morning,    Pt is scheduled for follow up with Dr. Gar, however she sees Dr. Hook. Please call pt to reschedule a video rtn with Dr. Hook. Ok to split a new pt spot, however please relabel the remaining open 40 minutes as a rtn spot.    Thank you,    Katelynn

## 2022-08-03 NOTE — TELEPHONE ENCOUNTER
08.03- lmtcb x1    Currently there is a video return visit available on Dr Hook's schedule on 09.28.2022

## 2022-08-16 ENCOUNTER — VIRTUAL VISIT (OUTPATIENT)
Dept: ONCOLOGY | Facility: CLINIC | Age: 42
End: 2022-08-16
Attending: PHYSICIAN ASSISTANT
Payer: COMMERCIAL

## 2022-08-16 DIAGNOSIS — C50.211 MALIGNANT NEOPLASM OF UPPER-INNER QUADRANT OF RIGHT BREAST IN FEMALE, ESTROGEN RECEPTOR POSITIVE (H): Primary | ICD-10-CM

## 2022-08-16 DIAGNOSIS — Z17.0 MALIGNANT NEOPLASM OF UPPER-INNER QUADRANT OF RIGHT BREAST IN FEMALE, ESTROGEN RECEPTOR POSITIVE (H): Primary | ICD-10-CM

## 2022-08-16 PROCEDURE — G0463 HOSPITAL OUTPT CLINIC VISIT: HCPCS | Mod: PN,RTG | Performed by: PHYSICIAN ASSISTANT

## 2022-08-16 PROCEDURE — 99215 OFFICE O/P EST HI 40 MIN: CPT | Mod: 95 | Performed by: PHYSICIAN ASSISTANT

## 2022-08-16 NOTE — PROGRESS NOTES
Leah is a 42 year old who is being evaluated via a billable video visit.      If the video visit is dropped, the invitation should be resent by: Send to e-mail at: [email or web address]  Will anyone else be joining your video visit? No        Video-Visit Details    Video Start Time: 4:31 PM    Type of service:  Video Visit    Video End Time:4:51 PM    Originating Location (pt. Location): Home    Distant Location (provider location):  [facility]     Platform used for Video Visit: [employer]               Reason for Visit: follow-up invasive ductal carcinoma of the R breast, grade 3, ER positive, IN positive, HER-2 positive 3+ by IHC    Oncology HPI:   Leah Rangel is a  42 year old, premenopausal female, diagnosed with R breast cancer, T2N0 right breast cancer.      Leah reports that she has a history of bilateral implants.  She noticed a palpable mass involving her right breast in the upper inner quadrant in early 06/2020.  At that time, she went in for an evaluation with a mammogram and ultrasound.  Mammogram on 06/11/2020 revealed a 7 mm mass at the 1 o'clock position approximately 5 cm from the nipple in the upper inner quadrant of the right breast.  There was a second area measuring 6 x 5 x 4 mm at the 1 o'clock position 6 cm from the nipple.  In the left breast, there was an area of microcalcifications.  She was referred for a right breast biopsy.  Both areas at the 1 o'clock position were biopsied.  No lymph nodes were appreciated.  Both of these biopsies revealed an invasive ductal carcinoma, grade 3, ER positive, IN positive, HER-2 positive, 3+ by IHC.  She  was considered to have a T1b multifocal N1 breast cancer.  On further review, it appeared to be T2Nx. She was subsequently referred for a breast MRI.  The Breast MRI done on 7/10/20 showed a 4.5 x 2.4 x 3 cm mass in the R upper, inner breast. She underwent a PET/CT 7/16 and started neoadjuvant treatment  with Docetaxel/Carboplatin/Trastuzumab/Pertuzumab (TCHP) 7/17/20.  Lymph nodes appeared normal on MRI and PET.     She completed five cycles of TCHP 10/9/20, had a sixth cycle with just herceptin and perjeta on 10/30/20.  She had significant problems with fatigue, nausea, weight loss and diarrhea.  As a result, her treatment was stopped one cycle early.     She underwent bilateral mastectomies and sentinel lymph node biopsy on 11/11/20 with Dr Marquez Vargas and Dr Piotr Ochoa. She had a pathologic CR. She was continued on adjuvant trastuzumab/pertuzumab on 11/23/20 with a plan for one year of adjuvant therapy. This was completed on 8/27/21. She was also started on adjuvant tamoxifen.     Interval history: Leah feels great. She does have some lingering symptoms/side effects but these are all stable to improved. She continues to have intermittent joint pain either from tamoxifen or since treatment. Sometimes she has stiffness when she first gets out of the chair. She uses cannabis vape sometimes for this. She continues to have loose stools every morning, sometimes up to 5-6x. This has been the same since she completed adjuvant TP. No abdominal pain, fevers/chills, n/v though she does have easy gag reflex.     Energy level has been decent. Hot flashes are better. She has only had menses 4x since chemotherapy. Last one was in February.     No new lumps/bumps. Still has R upper chest wall pain intermittently which was worked up this spring. No fevers or recent infections. Appetite is good.     Current Outpatient Medications   Medication Sig Dispense Refill     bimatoprost (LATISSE) 0.03 % external opthalmic solution Apply 1 drop topically At Bedtime 5 mL 1     diphenhydrAMINE-acetaminophen (TYLENOL PM)  MG tablet Take 1 tablet by mouth nightly as needed for sleep       lidocaine (XYLOCAINE) 2 % external gel Apply topically 3 times daily as needed for moderate pain 30 mL 1     lidocaine-prilocaine (EMLA) 2.5-2.5  % external cream Apply topically as needed for moderate pain Apply to PORT site 45 minutes prior to procedure.  Cover with a non-absorbent dressing. 30 g 4     LORazepam (ATIVAN) 0.5 MG tablet Take 1 tablet (0.5 mg) by mouth every 4 hours as needed (Anxiety, Nausea/Vomiting or Sleep) 10 tablet 0     magic mouthwash suspension, diphenhydrAMINE, lidocaine, aluminum-magnesium & simethicone, (FIRST-MOUTHWASH BLM) compounding kit Swish and swallow 5-10 mLs in mouth every 6 hours as needed for mouth sores (Patient not taking: Reported on 6/4/2021) 237 mL 1     order for DME Equipment: Wig  Diagnosis: cranial alopecia 1 each 0     prochlorperazine (COMPAZINE) 10 MG tablet Take 1 tablet (10 mg) by mouth every 6 hours as needed (Nausea/Vomiting) 30 tablet 3     tamoxifen (NOLVADEX) 20 MG tablet Take 1 tablet (20 mg) by mouth daily 90 tablet 3     Vaginal Lubricant (REPLENS) GEL Apply vaginally as needed 35 g 1          Allergies   Allergen Reactions     Codeine Other (See Comments)     Got hyper         PE: There were no vitals taken for this visit.    Video physical exam  General: Patient appears well in no acute distress.   Skin: No visualized rash or lesions on visualized skin  Eyes: EOMI, no erythema, sclera icterus or discharge noted  Resp: Appears to be breathing comfortably without accessory muscle usage, speaking in full sentences, no cough  MSK: Appears to have normal range of motion based on visualized movements  Neurologic: No apparent tremors, facial movements symmetric  Psych: affect bright, alert and oriented        Labs: n/a    Imaging:   EXAMINATION: US AXILLARY BILATERAL, 7/18/2022 11:18 AM      HISTORY/FAMILY HISTORY: The patient reports the right chest symptoms  have resolved. Short-term follow-up of bilateral probably benign  axillary lymph nodes.       COMPARISON: 4/27/2022, 4/21/2022     FINDINGS: Targeted ultrasound evaluation was performed by the  technologist and radiologist.  Ultrasound of the right  and left axilla demonstrates multiple  similar-appearing normally shaped and plump lymph nodes. No suspicious  sonographic findings.                                                                      IMPRESSION: BI-RADS CATEGORY: 2 - Benign.     RECOMMENDED FOLLOW-UP: Clinical Follow-up.    Impression/plan:   ER/AL +, HER-2 positive R upper/inner quadrant breast cancer. Clinically T2N0. Treated with neoadjuvant TCHP x 6 cycles and herceptin/perjeta alone with cycle 6. S/P bilateral mastecomies and sentinel LN bx on 11/11/20 with a pathologic CR. She went on to complete adjuvant Herceptin.     Breast cancer - She remains on adjuvant endocrine treatment with tamoxifen. She is tolerating fairly well with hot flashes, joint pains, possibly related diarrhea. Plan to continue this for at least 5 years, potentially extended endocrine therapy.     She was sent a treatment plan summary via [employer]. I reviewed surveillance schedule of H&P visits every 3 months until she is 2 years out from surgery, followed by every 4 months until year 3, then every 6 months until year 5. She does not require routine breast imaging given bilateral mastectomies.     Her recurrence risk with pathologic CR is extremely low.     Follow-up with Dr. Gerardo in 3 months. After this every 4 months is okay.     Pulm nodule - 3 mm on baseline CT.  This was repeated 8/2021 and was stable.      Cardiac monitoring while on HER2 therapy  -echo every 3 months, last done 6/3/21 WNL. No further echocardiograms are indicated     40 minutes spent on the date of the encounter doing chart review, review of test results, interpretation of tests, patient visit and documentation      Tahmina Roberson PA-C

## 2022-08-16 NOTE — LETTER
8/16/2022         RE: Leah Rangel  [address] [place] [address]        Dear Colleague,    Thank you for referring your patient, Leah Rangel, to the [facility]. Please see a copy of my visit note below.    Leah is a 42 year old who is being evaluated via a billable video visit.      If the video visit is dropped, the invitation should be resent by: Send to e-mail at: [email or web address]  Will anyone else be joining your video visit? No        Video-Visit Details    Video Start Time: 4:31 PM    Type of service:  Video Visit    Video End Time:4:51 PM    Originating Location (pt. Location): Home    Distant Location (provider location):  [facility]     Platform used for Video Visit: [employer]               Reason for Visit: follow-up invasive ductal carcinoma of the R breast, grade 3, ER positive, MS positive, HER-2 positive 3+ by IHC    Oncology HPI:   Leah Rangel is a  42 year old, premenopausal female, diagnosed with R breast cancer, T2N0 right breast cancer.      Leah reports that she has a history of bilateral implants.  She noticed a palpable mass involving her right breast in the upper inner quadrant in early 06/2020.  At that time, she went in for an evaluation with a mammogram and ultrasound.  Mammogram on 06/11/2020 revealed a 7 mm mass at the 1 o'clock position approximately 5 cm from the nipple in the upper inner quadrant of the right breast.  There was a second area measuring 6 x 5 x 4 mm at the 1 o'clock position 6 cm from the nipple.  In the left breast, there was an area of microcalcifications.  She was referred for a right breast biopsy.  Both areas at the 1 o'clock position were biopsied.  No lymph nodes were appreciated.  Both of these biopsies revealed an invasive ductal carcinoma, grade 3, ER positive, MS positive, HER-2 positive, 3+ by IHC.  She  was considered to have a T1b multifocal N1 breast cancer.  On  further review, it appeared to be T2Nx. She was subsequently referred for a breast MRI.  The Breast MRI done on 7/10/20 showed a 4.5 x 2.4 x 3 cm mass in the R upper, inner breast. She underwent a PET/CT 7/16 and started neoadjuvant treatment with Docetaxel/Carboplatin/Trastuzumab/Pertuzumab (TCHP) 7/17/20.  Lymph nodes appeared normal on MRI and PET.     She completed five cycles of TCHP 10/9/20, had a sixth cycle with just herceptin and perjeta on 10/30/20.  She had significant problems with fatigue, nausea, weight loss and diarrhea.  As a result, her treatment was stopped one cycle early.     She underwent bilateral mastectomies and sentinel lymph node biopsy on 11/11/20 with Dr Marquez Vargas and Dr Piotr Ochoa. She had a pathologic CR. She was continued on adjuvant trastuzumab/pertuzumab on 11/23/20 with a plan for one year of adjuvant therapy. This was completed on 8/27/21. She was also started on adjuvant tamoxifen.     Interval history: Leah feels great. She does have some lingering symptoms/side effects but these are all stable to improved. She continues to have intermittent joint pain either from tamoxifen or since treatment. Sometimes she has stiffness when she first gets out of the chair. She uses cannabis vape sometimes for this. She continues to have loose stools every morning, sometimes up to 5-6x. This has been the same since she completed adjuvant TP. No abdominal pain, fevers/chills, n/v though she does have easy gag reflex.     Energy level has been decent. Hot flashes are better. She has only had menses 4x since chemotherapy. Last one was in February.     No new lumps/bumps. Still has R upper chest wall pain intermittently which was worked up this spring. No fevers or recent infections. Appetite is good.     Current Outpatient Medications   Medication Sig Dispense Refill     bimatoprost (LATISSE) 0.03 % external opthalmic solution Apply 1 drop topically At Bedtime 5 mL 1      diphenhydrAMINE-acetaminophen (TYLENOL PM)  MG tablet Take 1 tablet by mouth nightly as needed for sleep       lidocaine (XYLOCAINE) 2 % external gel Apply topically 3 times daily as needed for moderate pain 30 mL 1     lidocaine-prilocaine (EMLA) 2.5-2.5 % external cream Apply topically as needed for moderate pain Apply to PORT site 45 minutes prior to procedure.  Cover with a non-absorbent dressing. 30 g 4     LORazepam (ATIVAN) 0.5 MG tablet Take 1 tablet (0.5 mg) by mouth every 4 hours as needed (Anxiety, Nausea/Vomiting or Sleep) 10 tablet 0     magic mouthwash suspension, diphenhydrAMINE, lidocaine, aluminum-magnesium & simethicone, (FIRST-MOUTHWASH BLM) compounding kit Swish and swallow 5-10 mLs in mouth every 6 hours as needed for mouth sores (Patient not taking: Reported on 6/4/2021) 237 mL 1     order for DME Equipment: Wig  Diagnosis: cranial alopecia 1 each 0     prochlorperazine (COMPAZINE) 10 MG tablet Take 1 tablet (10 mg) by mouth every 6 hours as needed (Nausea/Vomiting) 30 tablet 3     tamoxifen (NOLVADEX) 20 MG tablet Take 1 tablet (20 mg) by mouth daily 90 tablet 3     Vaginal Lubricant (REPLENS) GEL Apply vaginally as needed 35 g 1          Allergies   Allergen Reactions     Codeine Other (See Comments)     Got hyper         PE: There were no vitals taken for this visit.    Video physical exam  General: Patient appears well in no acute distress.   Skin: No visualized rash or lesions on visualized skin  Eyes: EOMI, no erythema, sclera icterus or discharge noted  Resp: Appears to be breathing comfortably without accessory muscle usage, speaking in full sentences, no cough  MSK: Appears to have normal range of motion based on visualized movements  Neurologic: No apparent tremors, facial movements symmetric  Psych: affect bright, alert and oriented        Labs: n/a    Imaging:   EXAMINATION: US AXILLARY BILATERAL, 7/18/2022 11:18 AM      HISTORY/FAMILY HISTORY: The patient reports the right  chest symptoms  have resolved. Short-term follow-up of bilateral probably benign  axillary lymph nodes.       COMPARISON: 4/27/2022, 4/21/2022     FINDINGS: Targeted ultrasound evaluation was performed by the  technologist and radiologist.  Ultrasound of the right and left axilla demonstrates multiple  similar-appearing normally shaped and plump lymph nodes. No suspicious  sonographic findings.                                                                      IMPRESSION: BI-RADS CATEGORY: 2 - Benign.     RECOMMENDED FOLLOW-UP: Clinical Follow-up.    Impression/plan:   ER/AR +, HER-2 positive R upper/inner quadrant breast cancer. Clinically T2N0. Treated with neoadjuvant TCHP x 6 cycles and herceptin/perjeta alone with cycle 6. S/P bilateral mastecomies and sentinel LN bx on 11/11/20 with a pathologic CR. She went on to complete adjuvant Herceptin.     Breast cancer - She remains on adjuvant endocrine treatment with tamoxifen. She is tolerating fairly well with hot flashes, joint pains, possibly related diarrhea. Plan to continue this for at least 5 years, potentially extended endocrine therapy.     She was sent a treatment plan summary via [employer]. I reviewed surveillance schedule of H&P visits every 3 months until she is 2 years out from surgery, followed by every 4 months until year 3, then every 6 months until year 5. She does not require routine breast imaging given bilateral mastectomies.     Her recurrence risk with pathologic CR is extremely low.     Follow-up with Dr. Gerardo in 3 months. After this every 4 months is okay.     Pulm nodule - 3 mm on baseline CT.  This was repeated 8/2021 and was stable.      Cardiac monitoring while on HER2 therapy  -echo every 3 months, last done 6/3/21 WNL. No further echocardiograms are indicated     40 minutes spent on the date of the encounter doing chart review, review of test results, interpretation of tests, patient visit and documentation      Tahmina Roberson PA-C        Again, thank you for allowing me to participate in the care of your patient.        Sincerely,        Tahmina Roberson PA-C

## 2022-09-27 NOTE — PROGRESS NOTES
Leah is a 42 year old who is being evaluated via a billable video visit.  Currently in [place].    How would you like to obtain your AVS? MyChart  If the video visit is dropped, the invitation should be resent by: Text to cell phone: [phone]  Will anyone else be joining your video visit? No     JER Holley      Palliative Care Outpatient Clinic Progress Note    Patient Name: Leah Rangel is being evaluated via a billable video visit.    Primary Provider:  Ge Wiggins  Primary Oncologist: Dr Gerardo  Last seen by palliative care: myself, 10/13/21      Chief Complaint: no issues, doing well    Background/Summary  Medical:  - R breast cancer (invasive ductal carcinomatriple positive) diagnosed 2020 after patient noticed a mass in her breast               - completed neoadjuvant TCHP (much issues with fatigue, nausea, weight loss, diarrhea, so stopped one cycle early)              - s/p b/l mastectomy 2020 followed by adjuvant trastruzumab/pertuzumab and tamoxifen. Now on tamoxifen alone since 2021         Interim History:  Patient is on the call by herself    She is doing well, physically and emotionally.   No needs today.       Impression & Recommendations & Counseliny/o woman with breast cancer on tamoxifen.     She is doing well and has no needs at this time.       Return to clinic as needed    Data / Chart Review:    Review of Systems:   ROS: 10 point ROS neg other than the symptoms noted above in the HPI and pertinents here.         Physical Exam:   Physical Exam:  Vital Signs not checked, virtual visit    CONSTIT: awake, appears comfortable  EENT: MMM, EOMI, no icterus  RESP: reg, nl effort, no cough  NEURO: alert, oriented x3  PSYCH: appropriate affect, memory and thought process intact    The rest of a comprehensive physical examination is deferred  due to public health emergency video visit restrictions.      Current pertinent medications:  reviewed      No  pertinent allergies      Lab and imaging data reviewed:  Comments:           Video-Visit Details    Type of service:  Video Visit    Physician has received verbal consent for a Video Visit from the patient? Yes    Video Start Time: 1315    Video End Time (time video stopped): 1322    Originating Location (pt. Location): Home    Distant Location (provider location):  [facility]     Mode of Communication:  Video Conference via [facility]    Deborah Hook MD  Palliative Medicine  Pager [phone]

## 2022-09-28 ENCOUNTER — VIRTUAL VISIT (OUTPATIENT)
Dept: ONCOLOGY | Facility: CLINIC | Age: 42
End: 2022-09-28
Payer: COMMERCIAL

## 2022-09-28 DIAGNOSIS — Z17.0 MALIGNANT NEOPLASM OF UPPER-INNER QUADRANT OF RIGHT BREAST IN FEMALE, ESTROGEN RECEPTOR POSITIVE (H): Primary | ICD-10-CM

## 2022-09-28 DIAGNOSIS — C50.211 MALIGNANT NEOPLASM OF UPPER-INNER QUADRANT OF RIGHT BREAST IN FEMALE, ESTROGEN RECEPTOR POSITIVE (H): Primary | ICD-10-CM

## 2022-09-28 PROCEDURE — 99212 OFFICE O/P EST SF 10 MIN: CPT | Mod: 95 | Performed by: HOSPITALIST

## 2022-11-28 ENCOUNTER — ONCOLOGY VISIT (OUTPATIENT)
Dept: ONCOLOGY | Facility: CLINIC | Age: 42
End: 2022-11-28
Attending: INTERNAL MEDICINE
Payer: COMMERCIAL

## 2022-11-28 VITALS
RESPIRATION RATE: 14 BRPM | SYSTOLIC BLOOD PRESSURE: 117 MMHG | HEIGHT: 64 IN | HEART RATE: 86 BPM | OXYGEN SATURATION: 98 % | DIASTOLIC BLOOD PRESSURE: 87 MMHG | WEIGHT: 110 LBS | BODY MASS INDEX: 18.78 KG/M2

## 2022-11-28 DIAGNOSIS — Z17.0 MALIGNANT NEOPLASM OF UPPER-INNER QUADRANT OF RIGHT BREAST IN FEMALE, ESTROGEN RECEPTOR POSITIVE (H): Primary | ICD-10-CM

## 2022-11-28 DIAGNOSIS — R91.8 PULMONARY NODULES: ICD-10-CM

## 2022-11-28 DIAGNOSIS — Z91.89 AT RISK FOR CARDIOMYOPATHY: ICD-10-CM

## 2022-11-28 DIAGNOSIS — C50.211 MALIGNANT NEOPLASM OF UPPER-INNER QUADRANT OF RIGHT BREAST IN FEMALE, ESTROGEN RECEPTOR POSITIVE (H): Primary | ICD-10-CM

## 2022-11-28 PROCEDURE — G0463 HOSPITAL OUTPT CLINIC VISIT: HCPCS

## 2022-11-28 PROCEDURE — 99214 OFFICE O/P EST MOD 30 MIN: CPT | Performed by: INTERNAL MEDICINE

## 2022-11-28 ASSESSMENT — PAIN SCALES - GENERAL: PAINLEVEL: NO PAIN (0)

## 2022-11-28 NOTE — LETTER
11/28/2022         RE: Leah Rangel  [address] [place] [address]        Dear Colleague,    Thank you for referring your patient, Laeh Rangel, to the [facility]. Please see a copy of my visit note below.    November 28, 2022     Reason for Visit: follow-up invasive ductal carcinoma of the R breast, grade 3, ER positive, CA positive, HER-2 positive 3+ by IHC    Oncology HPI:   Leah Rangel is a  42 year old, premenopausal female, diagnosed with R breast cancer, T2N0 right breast cancer.      Leah reports that she has a history of bilateral implants.  She noticed a palpable mass involving her right breast in the upper inner quadrant in early 06/2020.  At that time, she went in for an evaluation with a mammogram and ultrasound.  Mammogram on 06/11/2020 revealed a 7 mm mass at the 1 o'clock position approximately 5 cm from the nipple in the upper inner quadrant of the right breast.  There was a second area measuring 6 x 5 x 4 mm at the 1 o'clock position 6 cm from the nipple.  In the left breast, there was an area of microcalcifications.  She was referred for a right breast biopsy.  Both areas at the 1 o'clock position were biopsied.  No lymph nodes were appreciated.  Both of these biopsies revealed an invasive ductal carcinoma, grade 3, ER positive, CA positive, HER-2 positive, 3+ by IHC.  She  was considered to have a T1b multifocal N1 breast cancer.  On further review, it appeared to be T2Nx. She was subsequently referred for a breast MRI.  The BMRI done on 7/10/20 showed a 4.5 x 2.4 x 3 cm mass in the R upper, inner breast. She underwent a PET/CT 7/16 and started neoadjuvant treatment with Docetaxel/Carboplatin/Trastuzumab/Pertuzumab (TCHP) 7/17/20.  Lymph nodes appeared normal on MRI and PET.     She completed five cycles of TCHP 10/9/20, had a sixth cycle with just herceptin and perjeta on 10/30/20.  She had significant problems with fatigue, nausea, weight loss  and diarrhea.  As a result, her treatment was stopped one cycle early.     She underwent bilateral mastectomies and sentinel lymph node biopsy on 11/11/20 with Dr Marquez Vargas and Dr Piotr Ochoa. She had a pathologic CR. She was continued on adjuvant trastuzumab/pertuzumab on 11/23/20 with a plan for one year of adjuvant therapy. She was also started on adjuvant tamoxifen.     Interval history: Leah is feeling well today.       She is feeling much more like herself.       She has more energy.         She is on tamoxifen and tolerating this therapy well.  She is thrilled with how she is doing.    ROS: 10 point ROS neg other than the symptoms noted above in the HPI.      Current Outpatient Medications   Medication Sig Dispense Refill     bimatoprost (LATISSE) 0.03 % external opthalmic solution Apply 1 drop topically At Bedtime 5 mL 1     diphenhydrAMINE-acetaminophen (TYLENOL PM)  MG tablet Take 1 tablet by mouth nightly as needed for sleep (Patient not taking: Reported on 9/28/2022)       lidocaine (XYLOCAINE) 2 % external gel Apply topically 3 times daily as needed for moderate pain (Patient not taking: Reported on 9/28/2022) 30 mL 1     lidocaine-prilocaine (EMLA) 2.5-2.5 % external cream Apply topically as needed for moderate pain Apply to PORT site 45 minutes prior to procedure.  Cover with a non-absorbent dressing. (Patient not taking: Reported on 9/28/2022) 30 g 4     LORazepam (ATIVAN) 0.5 MG tablet Take 1 tablet (0.5 mg) by mouth every 4 hours as needed (Anxiety, Nausea/Vomiting or Sleep) (Patient not taking: Reported on 9/28/2022) 10 tablet 0     magic mouthwash suspension, diphenhydrAMINE, lidocaine, aluminum-magnesium & simethicone, (FIRST-MOUTHWASH BLM) compounding kit Swish and swallow 5-10 mLs in mouth every 6 hours as needed for mouth sores (Patient not taking: Reported on 6/4/2021) 237 mL 1     order for DME Equipment: Wig  Diagnosis: cranial alopecia 1 each 0     prochlorperazine  "(COMPAZINE) 10 MG tablet Take 1 tablet (10 mg) by mouth every 6 hours as needed (Nausea/Vomiting) (Patient not taking: Reported on 9/28/2022) 30 tablet 3     tamoxifen (NOLVADEX) 20 MG tablet Take 1 tablet (20 mg) by mouth daily 90 tablet 3     Vaginal Lubricant (REPLENS) GEL Apply vaginally as needed (Patient not taking: Reported on 9/28/2022) 35 g 1          Allergies   Allergen Reactions     Codeine Other (See Comments)     Got hyper         PE: /87 (BP Location: Right arm, Patient Position: Chair, Cuff Size: Adult Regular)   Pulse 86   Resp 14   Ht 1.626 m (5' 4.02\")   Wt 49.9 kg (110 lb)   SpO2 98%   BMI 18.87 kg/m      General: Patient appears well in no acute distress.   Skin: No visualized rash or lesions on visualized skin. Scalp shows short hair.  Eyes: EOMI, no erythema, sclera icterus or discharge noted  Resp: Appears to be breathing comfortably without accessory muscle usage, speaking in full sentences, no cough  MSK: Appears to have normal range of motion based on visualized movements  Neurologic: No apparent tremors, facial movements symmetric  Psych: affect pleasant, engaged, alert and oriented  Breast : s/p bilateral mastectomy with reconstruction, no nodules or masses, no axillary adenopathy          Wt Readings from Last 4 Encounters:   05/02/22 52.8 kg (116 lb 8 oz)   09/02/21 49 kg (108 lb)   08/27/21 48.9 kg (107 lb 12.8 oz)   08/06/21 47.7 kg (105 lb 1.6 oz)         Labs: n/a    Imaging: last echo 6/3/21 normal EF.    Reviewed breast imaging and ultrasound    Impression/plan:   ER/ND +, HER-2 positive R upper/inner quadrant breast cancer. Clinically T2N0. Treated with neoadjuvant TCHP x 6 cycles and herceptin/perjeta alone with cycle 6. S/P bilateral mastecomies and sentinel LN bx on 11/11/20 with a pathologic CR.    Breast cancer - she completed adjuvant herceptin in 8/27/21.  She is on tamoxifen and tolerating this well.   We discussed continuing this for five years, perhaps " longer.      We reviewed all of her imaging and clinical exam; this does not demonstrate any concerning findings for malignancy.    She will return in 6 months; she will need a echo and follow up chest CT at that time.      Pulm nodule - 3 mm on baseline CT.  This was repeated and was stable.  Will check one more set of imaging in 6 months.     Cardiac monitoring while on HER2 therapy  -echo every 3 months, last done 6/3/21 WN.        Lucita Gerardo MD     > 30 min were spent in reviewing records, counseling and coordinating care

## 2022-11-28 NOTE — PROGRESS NOTES
November 28, 2022     Reason for Visit: follow-up invasive ductal carcinoma of the R breast, grade 3, ER positive, DE positive, HER-2 positive 3+ by IHC    Oncology HPI:   Leah Rangel is a  42 year old, premenopausal female, diagnosed with R breast cancer, T2N0 right breast cancer.      Leah reports that she has a history of bilateral implants.  She noticed a palpable mass involving her right breast in the upper inner quadrant in early 06/2020.  At that time, she went in for an evaluation with a mammogram and ultrasound.  Mammogram on 06/11/2020 revealed a 7 mm mass at the 1 o'clock position approximately 5 cm from the nipple in the upper inner quadrant of the right breast.  There was a second area measuring 6 x 5 x 4 mm at the 1 o'clock position 6 cm from the nipple.  In the left breast, there was an area of microcalcifications.  She was referred for a right breast biopsy.  Both areas at the 1 o'clock position were biopsied.  No lymph nodes were appreciated.  Both of these biopsies revealed an invasive ductal carcinoma, grade 3, ER positive, DE positive, HER-2 positive, 3+ by IHC.  She  was considered to have a T1b multifocal N1 breast cancer.  On further review, it appeared to be T2Nx. She was subsequently referred for a breast MRI.  The BMRI done on 7/10/20 showed a 4.5 x 2.4 x 3 cm mass in the R upper, inner breast. She underwent a PET/CT 7/16 and started neoadjuvant treatment with Docetaxel/Carboplatin/Trastuzumab/Pertuzumab (TCHP) 7/17/20.  Lymph nodes appeared normal on MRI and PET.     She completed five cycles of TCHP 10/9/20, had a sixth cycle with just herceptin and perjeta on 10/30/20.  She had significant problems with fatigue, nausea, weight loss and diarrhea.  As a result, her treatment was stopped one cycle early.     She underwent bilateral mastectomies and sentinel lymph node biopsy on 11/11/20 with Dr Marquez Vargas and Dr Piotr Ochoa. She had a pathologic CR. She was continued on adjuvant  trastuzumab/pertuzumab on 11/23/20 with a plan for one year of adjuvant therapy. She was also started on adjuvant tamoxifen.     Interval history: Leah is feeling well today.       She is feeling much more like herself.       She has more energy.         She is on tamoxifen and tolerating this therapy well.  She is thrilled with how she is doing.    ROS: 10 point ROS neg other than the symptoms noted above in the HPI.      Current Outpatient Medications   Medication Sig Dispense Refill     bimatoprost (LATISSE) 0.03 % external opthalmic solution Apply 1 drop topically At Bedtime 5 mL 1     diphenhydrAMINE-acetaminophen (TYLENOL PM)  MG tablet Take 1 tablet by mouth nightly as needed for sleep (Patient not taking: Reported on 9/28/2022)       lidocaine (XYLOCAINE) 2 % external gel Apply topically 3 times daily as needed for moderate pain (Patient not taking: Reported on 9/28/2022) 30 mL 1     lidocaine-prilocaine (EMLA) 2.5-2.5 % external cream Apply topically as needed for moderate pain Apply to PORT site 45 minutes prior to procedure.  Cover with a non-absorbent dressing. (Patient not taking: Reported on 9/28/2022) 30 g 4     LORazepam (ATIVAN) 0.5 MG tablet Take 1 tablet (0.5 mg) by mouth every 4 hours as needed (Anxiety, Nausea/Vomiting or Sleep) (Patient not taking: Reported on 9/28/2022) 10 tablet 0     magic mouthwash suspension, diphenhydrAMINE, lidocaine, aluminum-magnesium & simethicone, (FIRST-MOUTHWASH BLM) compounding kit Swish and swallow 5-10 mLs in mouth every 6 hours as needed for mouth sores (Patient not taking: Reported on 6/4/2021) 237 mL 1     order for DME Equipment: Wig  Diagnosis: cranial alopecia 1 each 0     prochlorperazine (COMPAZINE) 10 MG tablet Take 1 tablet (10 mg) by mouth every 6 hours as needed (Nausea/Vomiting) (Patient not taking: Reported on 9/28/2022) 30 tablet 3     tamoxifen (NOLVADEX) 20 MG tablet Take 1 tablet (20 mg) by mouth daily 90 tablet 3     Vaginal Lubricant  "(REPLENS) GEL Apply vaginally as needed (Patient not taking: Reported on 9/28/2022) 35 g 1          Allergies   Allergen Reactions     Codeine Other (See Comments)     Got hyper         PE: /87 (BP Location: Right arm, Patient Position: Chair, Cuff Size: Adult Regular)   Pulse 86   Resp 14   Ht 1.626 m (5' 4.02\")   Wt 49.9 kg (110 lb)   SpO2 98%   BMI 18.87 kg/m      General: Patient appears well in no acute distress.   Skin: No visualized rash or lesions on visualized skin. Scalp shows short hair.  Eyes: EOMI, no erythema, sclera icterus or discharge noted  Resp: Appears to be breathing comfortably without accessory muscle usage, speaking in full sentences, no cough  MSK: Appears to have normal range of motion based on visualized movements  Neurologic: No apparent tremors, facial movements symmetric  Psych: affect pleasant, engaged, alert and oriented  Breast : s/p bilateral mastectomy with reconstruction, no nodules or masses, no axillary adenopathy          Wt Readings from Last 4 Encounters:   05/02/22 52.8 kg (116 lb 8 oz)   09/02/21 49 kg (108 lb)   08/27/21 48.9 kg (107 lb 12.8 oz)   08/06/21 47.7 kg (105 lb 1.6 oz)         Labs: n/a    Imaging: last echo 6/3/21 normal EF.    Reviewed breast imaging and ultrasound    Impression/plan:   ER/CA +, HER-2 positive R upper/inner quadrant breast cancer. Clinically T2N0. Treated with neoadjuvant TCHP x 6 cycles and herceptin/perjeta alone with cycle 6. S/P bilateral mastecomies and sentinel LN bx on 11/11/20 with a pathologic CR.    Breast cancer - she completed adjuvant herceptin in 8/27/21.  She is on tamoxifen and tolerating this well.   We discussed continuing this for five years, perhaps longer.      We reviewed all of her imaging and clinical exam; this does not demonstrate any concerning findings for malignancy.    She will return in 6 months; she will need a echo and follow up chest CT at that time.      Pulm nodule - 3 mm on baseline CT.  This " was repeated and was stable.  Will check one more set of imaging in 6 months.     Cardiac monitoring while on HER2 therapy  -echo every 3 months, last done 6/3/21 WNL.        Lucita Gerardo MD     > 30 min were spent in reviewing records, counseling and coordinating care

## 2022-11-28 NOTE — NURSING NOTE
"Oncology Rooming Note    November 28, 2022 12:27 PM   Leha Rangel is a 42 year old female who presents for:    Chief Complaint   Patient presents with     Oncology Clinic Visit     P RETURN - BREAST CANCER     Initial Vitals: /87 (BP Location: Right arm, Patient Position: Chair, Cuff Size: Adult Regular)   Pulse 86   Resp 14   Ht 1.626 m (5' 4.02\")   Wt 49.9 kg (110 lb)   SpO2 98%   BMI 18.87 kg/m   Estimated body mass index is 18.87 kg/m  as calculated from the following:    Height as of this encounter: 1.626 m (5' 4.02\").    Weight as of this encounter: 49.9 kg (110 lb). Body surface area is 1.5 meters squared.  No Pain (0) Comment: Data Unavailable   No LMP recorded. (Menstrual status: Chemotherapy).  Allergies reviewed: Yes  Medications reviewed: Yes    Medications: Medication refills not needed today.  Pharmacy name entered into [facility]:    [id number] [place] [facility] [address] AT [facility] OF [address]   [facility] [place], [place] - [address] [place] [address] [phone]    Aron Ding LPN            "

## 2022-11-30 DIAGNOSIS — C50.211 MALIGNANT NEOPLASM OF UPPER-INNER QUADRANT OF RIGHT BREAST IN FEMALE, ESTROGEN RECEPTOR POSITIVE (H): ICD-10-CM

## 2022-11-30 DIAGNOSIS — Z17.0 MALIGNANT NEOPLASM OF UPPER-INNER QUADRANT OF RIGHT BREAST IN FEMALE, ESTROGEN RECEPTOR POSITIVE (H): ICD-10-CM

## 2022-11-30 RX ORDER — TAMOXIFEN CITRATE 20 MG/1
20 TABLET ORAL DAILY
Qty: 90 TABLET | Refills: 3 | Status: SHIPPED | OUTPATIENT
Start: 2022-11-30 | End: 2023-09-01

## 2023-01-01 NOTE — PATIENT INSTRUCTIONS
[facility] Triage and after hours / weekends / holidays:  [phone]    Please call the triage or after hours line if you experience a temperature greater than or equal to 100.5, shaking chills, have uncontrolled nausea, vomiting and/or diarrhea, dizziness, shortness of breath, chest pain, bleeding, unexplained bruising, or if you have any other new/concerning symptoms, questions or concerns.      If you are having any concerning symptoms or wish to speak to a provider before your next infusion visit, please call your care coordinator or triage to notify them so we can adequately serve you.     If you need a refill on a narcotic prescription or other medication, please call before your infusion appointment.                 January 2022 Sunday Monday Tuesday Wednesday Thursday Friday Saturday                            1     2       3     4     5  Happy Birthday!     6     7     8    LAB CENTRAL   8:15 AM   (15 min.)   [facility]   [facility] ONC INFUSION 360   9:00 AM   (360 min.)    ONCOLOGY INFUSION   [facility] 9       10     11     12     13     14     15     16       17     18     19     20     21     22     23       24     25     26     27     28     29    LAB CENTRAL   8:15 AM   (15 min.)    [facility]   [facility] ONC INFUSION 360   9:00 AM   (360 min.)    ONCOLOGY INFUSION   [facility] 30 31 February 2021 Sunday Monday Tuesday Wednesday Thursday Friday Saturday        1     2     3     4     5     6       7     8     9     10     11    VIDEO VISIT RETURN   8:15 AM   (30 min.)   Lucita Gerardo MD   [facility] 12     13       14     15     16     17     18    LAB CENTRAL   8:00 AM   (15 min.)   [facility]   [facility] 19    ECHO  Goal Outcome Evaluation:           Progress: improving  Outcome Evaluation: VSS, voiding and stooling, tolerating similac sensitive well, cardiac echo done this shift - will follow up in pediatric office, bonding well with parents, home today per md          LIMITED   8:00 AM   (60 min.)   [id number]   [facility] ONC INFUSION 360   9:00 AM   (360 min.)   UC ONCOLOGY INFUSION   [facility] 20       21     22     23     24     25     26     27       28                                                  Recent Results (from the past 24 hour(s))   CBC with platelets and differential    Collection Time: 01/08/21  8:52 AM   Result Value Ref Range    WBC 5.4 4.0 - 11.0 10e9/L    RBC Count 3.16 (L) 3.8 - 5.2 10e12/L    Hemoglobin 12.5 11.7 - 15.7 g/dL    Hematocrit 36.3 35.0 - 47.0 %     (H) 78 - 100 fl    MCH 39.6 (H) 26.5 - 33.0 pg    MCHC 34.4 31.5 - 36.5 g/dL    RDW 12.6 10.0 - 15.0 %    Platelet Count 196 150 - 450 10e9/L    Diff Method Automated Method     % Neutrophils 54.3 %    % Lymphocytes 31.1 %    % Monocytes 9.6 %    % Eosinophils 3.9 %    % Basophils 0.7 %    % Immature Granulocytes 0.4 %    Nucleated RBCs 0 0 /100    Absolute Neutrophil 2.9 1.6 - 8.3 10e9/L    Absolute Lymphocytes 1.7 0.8 - 5.3 10e9/L    Absolute Monocytes 0.5 0.0 - 1.3 10e9/L    Absolute Eosinophils 0.2 0.0 - 0.7 10e9/L    Absolute Basophils 0.0 0.0 - 0.2 10e9/L    Abs Immature Granulocytes 0.0 0 - 0.4 10e9/L    Absolute Nucleated RBC 0.0    Comprehensive metabolic panel (BMP + Alb, Alk Phos, ALT, AST, Total. Bili, TP)    Collection Time: 01/08/21  8:52 AM   Result Value Ref Range    Sodium 136 133 - 144 mmol/L    Potassium 4.1 3.4 - 5.3 mmol/L    Chloride 104 94 - 109 mmol/L    Carbon Dioxide 29 20 - 32 mmol/L    Anion Gap 2 (L) 3 - 14 mmol/L    Glucose 101 (H) 70 - 99 mg/dL    Urea Nitrogen 4 (L) 7 - 30 mg/dL    Creatinine 0.61 0.52 - 1.04 mg/dL    GFR Estimate >90 >60 mL/min/[1.73_m2]    GFR Estimate If Black >90 >60 mL/min/[1.73_m2]    Calcium 9.0 8.5 - 10.1 mg/dL    Bilirubin Total 0.2 0.2 - 1.3 mg/dL    Albumin 3.6 3.4 - 5.0 g/dL    Protein Total 7.0 6.8 - 8.8 g/dL    Alkaline Phosphatase 78 40 - 150 U/L    ALT 14 0 - 50 U/L     AST 11 0 - 45 U/L

## 2023-04-16 ENCOUNTER — HEALTH MAINTENANCE LETTER (OUTPATIENT)
Age: 43
End: 2023-04-16

## 2023-06-21 NOTE — TELEPHONE ENCOUNTER
Pt called to inquire about status of infusion, port, etc. Advised her I will let her RN know to call back with status.    I also provided pt with [facility] phone# and Triage for PRN use.    She is anxious about appts, says the pain in her right breast continues to get steadily worse; she did report this pain to Dr Gerardo during their video visit yesterday (7/9); she denies any fever, chills, new symptoms. I advised her the pain is likely related to her known right breast cancer and chemo may be the most effective way to address the pain. She verbalizes understanding, would like RNCC to call today.   Pt alert and oriented x3, no acute distress noted, calm and pleasant with respirations even and unlabored, skin warm and dry, color appropriate for ethnicity, speech clear, moving extremities with strength and purpose. Pt reports being sent by pcp for low hemoglobin and was told needed transfusion. Pt denies vaginal bleeding, denies rectal bleed, denies black tarry stools, reports has fibroid and lmp 2 weeks pta which was heavy but "normal for her". Pt c/o numbness to first 2 toes on both feet x one month and HA onset approx 1030 today. Pt noted to have edema to bilateral feet and ankles.  Pt made aware of plan of care and verbalized understanding of same. Pt was seen by provider prior to full RN assessment and full assessment deferred to same.

## 2023-07-18 NOTE — PROGRESS NOTES
Palliative Care Outpatient Clinic Progress Note    Patient Name: Leah Rangel is being evaluated via a billable video visit.    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 Now on tamoxifen alone since 2021         Interim History:  At the last visit she was doing well, no changes at the time    Patient is on the call by herself  History gathered today from: patient, medical chart    She is doing quite well, still has some stiffness/cramps which respond nicely to cannabis products. She also uses OTC NSAIDs/acetaminophen      Impression & Recommendations & Counseliny/o woman with PMHx of breast cancer    Pain: milder, residual from cancer treatment.   - renewed cannabis cert  - encouraged her to check with PCP if she needs recerts in the future. Otherwise, I'll continue this part of her care      Return to clinic as needed    Data / Chart Review:    Review of Systems:   ROS: 10 point ROS neg other than the symptoms noted above in the HPI and pertinents here.         Physical Exam:   Physical Exam:  Vital Signs not checked, virtual visit    CONSTIT: awake, appears comfortable  EENT: MMM, EOMI, no icterus  RESP: reg, nl effort, no cough  MSK: moves x4, DA  SKIN: no rash, no obvious lesions  NEURO: alert, oriented x3  PSYCH: appropriate affect, memory and thought process intact    The rest of a comprehensive physical examination is deferred  due to public health emergency video visit restrictions.      Current  pertinent medications:  reviewed      No pertinent allergies      Lab and imaging data reviewed:  Comments:       Video-Visit Details    Type of service:  Video Visit    Physician has received verbal consent for a Video Visit from the patient? Yes    Video Start Time: 1434    Video End Time (time video stopped): 1441    Originating Location (pt. Location): [place]    Distant Location (provider location):  [facility]   Distant Location (provider location):  Off-site    Mode of Communication:  Video Conference via Emil Hook MD  Palliative Medicine

## 2023-07-19 ENCOUNTER — VIRTUAL VISIT (OUTPATIENT)
Dept: ONCOLOGY | Facility: CLINIC | Age: 43
End: 2023-07-19
Payer: COMMERCIAL

## 2023-07-19 VITALS — HEIGHT: 65 IN | BODY MASS INDEX: 17.49 KG/M2 | WEIGHT: 105 LBS

## 2023-07-19 DIAGNOSIS — Z17.0 MALIGNANT NEOPLASM OF UPPER-INNER QUADRANT OF RIGHT BREAST IN FEMALE, ESTROGEN RECEPTOR POSITIVE (H): Primary | ICD-10-CM

## 2023-07-19 DIAGNOSIS — C50.211 MALIGNANT NEOPLASM OF UPPER-INNER QUADRANT OF RIGHT BREAST IN FEMALE, ESTROGEN RECEPTOR POSITIVE (H): Primary | ICD-10-CM

## 2023-07-19 PROCEDURE — 99212 OFFICE O/P EST SF 10 MIN: CPT | Mod: VID | Performed by: HOSPITALIST

## 2023-07-19 ASSESSMENT — PAIN SCALES - GENERAL: PAINLEVEL: NO PAIN (0)

## 2023-07-19 NOTE — LETTER
2023         RE: Leah Rangel  [address] [place] [address]        Dear Colleague,    Thank you for referring your patient, Leah Rangel, to the [facility]. Please see a copy of my visit note below.    Palliative Care Outpatient Clinic Progress Note    Patient Name: Leah Rangel is being evaluated via a billable video visit.    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 Now on tamoxifen alone since 2021         Interim History:  At the last visit she was doing well, no changes at the time    Patient is on the call by herself  History gathered today from: patient, medical chart    She is doing quite well, still has some stiffness/cramps which respond nicely to cannabis products. She also uses OTC NSAIDs/acetaminophen      Impression & Recommendations & Counseliny/o woman with PMHx of breast cancer    Pain: milder, residual from cancer treatment.   - renewed cannabis cert  - encouraged her to check with PCP if she needs recerts in the future. Otherwise, I'll continue this part of her care      Return to clinic as needed    Data / Chart Review:    Review of Systems:   ROS: 10 point ROS neg other than the symptoms noted above in the HPI and pertinents here.         Physical Exam:   Physical Exam:  Vital Signs not checked, virtual visit    CONSTIT: awake, appears comfortable  EENT: MMM, EOMI, no icterus  RESP: reg, nl effort, no cough  MSK: moves x4,  DA  SKIN: no rash, no obvious lesions  NEURO: alert, oriented x3  PSYCH: appropriate affect, memory and thought process intact    The rest of a comprehensive physical examination is deferred  due to public health emergency video visit restrictions.      Current pertinent medications:  reviewed      No pertinent allergies      Lab and imaging data reviewed:  Comments:       Video-Visit Details    Type of service:  Video Visit    Physician has received verbal consent for a Video Visit from the patient? Yes    Video Start Time: 1434    Video End Time (time video stopped): 1441    Originating Location (pt. Location): [place]    Distant Location (provider location):  [facility]   Distant Location (provider location):  Off-site    Mode of Communication:  Video Conference via [employer]    Deborah Hook MD  Palliative Medicine      Again, thank you for allowing me to participate in the care of your patient.        Sincerely,        Deborah Hook MD

## 2023-07-19 NOTE — NURSING NOTE
Is the patient currently in the state of [place]? YES    Visit mode:VIDEO    If the visit is dropped, the patient can be reconnected by: VIDEO VISIT: Text to cell phone: [phone]    Will anyone else be joining the visit? NO      How would you like to obtain your AVS? MyChart    Are changes needed to the allergy or medication list? Patient declined individual  medication review by support staff because patient denies any changes since echeck-in completion and states all information entered during echeck-in remains accurate.    Reason for visit: RECHECK      Pt declined Onc Distress Screening per pt    No other pt vitals to report per pt    Deepti Arredondo VF

## 2023-08-18 NOTE — ANESTHESIA POSTPROCEDURE EVALUATION
Patient: Leah Rangel    Procedure(s):  REMOVAL, VASCULAR ACCESS PORT RIGHT    Diagnosis:Malignant neoplasm of upper-inner quadrant of right breast in female, estrogen receptor positive (H) [C50.211, Z17.0]  Diagnosis Additional Information: No value filed.    Anesthesia Type:  MAC    Note:  Disposition: Outpatient   Postop Pain Control: Uneventful            Sign Out: Well controlled pain   PONV: No   Neuro/Psych: Uneventful            Sign Out: Acceptable/Baseline neuro status   Airway/Respiratory: Uneventful            Sign Out: Acceptable/Baseline resp. status   CV/Hemodynamics: Uneventful            Sign Out: Acceptable CV status; No obvious hypovolemia; No obvious fluid overload   Other NRE: NONE   DID A NON-ROUTINE EVENT OCCUR? No           Last vitals:  Vitals Value Taken Time   BP 95/62 09/02/21 1100   Temp 36.1  C (97  F) 09/02/21 1100   Pulse     Resp 16 09/02/21 1100   SpO2 100 % 09/02/21 1100       Electronically Signed By: Flaquito Peters MD  September 2, 2021  11:15 AM   [facility]

## 2023-09-01 DIAGNOSIS — Z17.0 MALIGNANT NEOPLASM OF UPPER-INNER QUADRANT OF RIGHT BREAST IN FEMALE, ESTROGEN RECEPTOR POSITIVE (H): ICD-10-CM

## 2023-09-01 DIAGNOSIS — C50.211 MALIGNANT NEOPLASM OF UPPER-INNER QUADRANT OF RIGHT BREAST IN FEMALE, ESTROGEN RECEPTOR POSITIVE (H): ICD-10-CM

## 2023-09-01 RX ORDER — TAMOXIFEN CITRATE 20 MG/1
20 TABLET ORAL DAILY
Qty: 90 TABLET | Refills: 3 | Status: SHIPPED | OUTPATIENT
Start: 2023-09-01

## 2023-09-01 NOTE — TELEPHONE ENCOUNTER
Tamoxifen Refill   Last prescribing provider: DR Gerardo     Last clinic visit date: 11/28/22  DR Gerardo     Recommendations for requested medication (if none, N/A): Copied from chart note    11/28/22 Dr Gerardo     Breast cancer - she completed adjuvant herceptin in 8/27/21. She is on tamoxifen and tolerating this well. We discussed continuing this for five years, perhaps longer.     Any other pertinent information (if none, N/A): N/A    Refilled: Y/N, if NO, why?

## 2024-02-04 ENCOUNTER — HEALTH MAINTENANCE LETTER (OUTPATIENT)
Age: 44
End: 2024-02-04

## 2024-06-23 ENCOUNTER — HEALTH MAINTENANCE LETTER (OUTPATIENT)
Age: 44
End: 2024-06-23

## 2024-07-01 ENCOUNTER — TELEPHONE (OUTPATIENT)
Dept: PALLIATIVE CARE | Facility: CLINIC | Age: 44
End: 2024-07-01
Payer: COMMERCIAL

## 2024-07-01 NOTE — TELEPHONE ENCOUNTER
7/1/2024 1st call, LVM X1. Patient is active on MyChart; MyChart message sent. Defer 2 days    IB request - Please call patient to schedule her with Dr. Hook for a return visit.  This visit should take place before the end of the calendar year.    Thank you,    Mari

## 2024-09-11 ENCOUNTER — VIRTUAL VISIT (OUTPATIENT)
Dept: ONCOLOGY | Facility: CLINIC | Age: 44
End: 2024-09-11
Attending: HOSPITALIST
Payer: COMMERCIAL

## 2024-09-11 VITALS — BODY MASS INDEX: 18.02 KG/M2 | HEIGHT: 64 IN

## 2024-09-11 DIAGNOSIS — C50.211 MALIGNANT NEOPLASM OF UPPER-INNER QUADRANT OF RIGHT BREAST IN FEMALE, ESTROGEN RECEPTOR POSITIVE (H): Primary | ICD-10-CM

## 2024-09-11 DIAGNOSIS — Z17.0 MALIGNANT NEOPLASM OF UPPER-INNER QUADRANT OF RIGHT BREAST IN FEMALE, ESTROGEN RECEPTOR POSITIVE (H): Primary | ICD-10-CM

## 2024-09-11 PROCEDURE — 99213 OFFICE O/P EST LOW 20 MIN: CPT | Mod: 95 | Performed by: HOSPITALIST

## 2024-09-11 ASSESSMENT — PAIN SCALES - GENERAL: PAINLEVEL: NO PAIN (0)

## 2024-09-11 NOTE — PROGRESS NOTES
Virtual Visit Details    Type of service:  Video Visit     Originating Location (pt. Location): Other work    Distant Location (provider location):  Off-site  Platform used for Video Visit: [facility]        Palliative Care Outpatient Clinic Progress Note    Patient Name: Leah Rangel is being evaluated via a billable video visit.    Primary Provider:  Ge Wiggins  Primary Oncologist: Dr Gerardo  Last seen by palliative care:       Chief Complaint: joint pain    Background/Summary  Medical:  - R breast cancer (invasive ductal carcinomatriple positive) diagnosed 2020 after patient noticed a mass in her breast               - completed neoadjuvant TCHP (much issues with fatigue, nausea, weight loss, diarrhea, so stopped one cycle early)              - s/p b/l mastectomy 2020 followed by adjuvant trastruzumab/pertuzumab and tamoxifen. Now on tamoxifen alone since 2021     Interim History:  At the last visit we discussed pain (milder, controlled with medical cannabis)    Patient is on the call by herself  History gathered today from: patient, medical chart    She still has joint pain, worse when more active. This hasn't stopped her from participating in activities.     She also has nausea after taking tamoxifen.      Impression & Recommendations & Counseliny/o woman with breast cancer on tamoxifen.     Pain: managed with medical cannabis    Nausea: managed with moving time of tamoxifen and medical cannabis      Return to clinic as needed    Data / Chart Review:    Review of Systems:   ROS: 10 point ROS neg other than the symptoms noted above in the HPI and pertinents here.         Physical Exam:   Physical Exam:  Vital Signs not checked, virtual visit    CONSTIT: awake, appears comfortable  EENT: MMM, EOMI, no icterus  RESP: reg, nl effort, no cough  SKIN: no rash, no obvious lesions  NEURO: alert, oriented x3  PSYCH: appropriate affect, memory and thought process intact    Wt  Readings from Last 3 Encounters:   07/19/23 47.6 kg (105 lb)   11/28/22 49.9 kg (110 lb)   05/02/22 52.8 kg (116 lb 8 oz)         Current pertinent medications:  reviewed      No pertinent allergies      Lab and imaging data reviewed:  Comments:       Deborah Hook MD  Palliative Medicine

## 2024-09-11 NOTE — NURSING NOTE
Current patient location: [address] [place] [address] [place] [address]    Is the patient currently in the state of [place]? YES    Visit mode:VIDEO    If the visit is dropped, the patient can be reconnected by: VIDEO VISIT: Text to cell phone:   Telephone Information:   Mobile [phone]       Will anyone else be joining the visit? NO  (If patient encounters technical issues they should call [phone])    How would you like to obtain your AVS? MyChart    Are changes needed to the allergy or medication list? No    Are refills needed on medications prescribed by this physician? NO  Pt would like a refill of Tamoxifen.     Rooming Documentation:  Unable to complete questionnaire(s) due to time      Reason for visit: RECHECK (Return [facility]CSL)    Sarai GODOY

## 2024-09-11 NOTE — LETTER
2024      Leah Rangel  [address] [place] [address]      Dear Colleague,    Thank you for referring your patient, Laeh Rangel, to the [facility] [place] [facility]. Please see a copy of my visit note below.    Virtual Visit Details    Type of service:  Video Visit     Originating Location (pt. Location): Other work  {PROVIDER LOCATION On-site should be selected for visits conducted from your clinic location or adjoining [facility] hospital, academic office, or other nearby [facility] building. Off-site should be selected for all other provider locations, including home:[id number]}  Distant Location (provider location):  Off-site  Platform used for Video Visit: [facility]        Palliative Care Outpatient Clinic Progress Note    Patient Name: Leah Rangel is being evaluated via a billable video visit.    Primary Provider:  Ge Wiggins  Primary Oncologist: Dr Gerardo  Last seen by palliative care:       Chief Complaint: joint pain    Background/Summary  Medical:  - R breast cancer (invasive ductal carcinomatriple positive) diagnosed 2020 after patient noticed a mass in her breast               - completed neoadjuvant TCHP (much issues with fatigue, nausea, weight loss, diarrhea, so stopped one cycle early)              - s/p b/l mastectomy 2020 followed by adjuvant trastruzumab/pertuzumab and tamoxifen. Now on tamoxifen alone since 2021     Interim History:  At the last visit we discussed pain (milder, controlled with medical cannabis)    Patient is on the call by herself  History gathered today from: patient, medical chart    She still has joint pain, worse when more active. This hasn't stopped her from participating in activities.     She also has nausea after taking tamoxifen.      Impression & Recommendations & Counseliny/o woman with breast cancer on tamoxifen.     Pain: managed with medical cannabis    Nausea: managed with moving time of tamoxifen and  medical cannabis      Return to clinic as needed    Data / Chart Review:    Review of Systems:   ROS: 10 point ROS neg other than the symptoms noted above in the HPI and pertinents here.         Physical Exam:   Physical Exam:  Vital Signs not checked, virtual visit    CONSTIT: awake, appears comfortable  EENT: MMM, EOMI, no icterus  RESP: reg, nl effort, no cough  SKIN: no rash, no obvious lesions  NEURO: alert, oriented x3  PSYCH: appropriate affect, memory and thought process intact    Wt Readings from Last 3 Encounters:   07/19/23 47.6 kg (105 lb)   11/28/22 49.9 kg (110 lb)   05/02/22 52.8 kg (116 lb 8 oz)         Current pertinent medications:  reviewed      No pertinent allergies      Lab and imaging data reviewed:  Comments:       Deborah Hook MD  Palliative Medicine      Again, thank you for allowing me to participate in the care of your patient.        Sincerely,        Deborah Hook MD

## 2024-12-16 ENCOUNTER — PATIENT OUTREACH (OUTPATIENT)
Dept: ONCOLOGY | Facility: CLINIC | Age: 44
End: 2024-12-16
Payer: COMMERCIAL

## 2024-12-16 DIAGNOSIS — C50.211 MALIGNANT NEOPLASM OF UPPER-INNER QUADRANT OF RIGHT BREAST IN FEMALE, ESTROGEN RECEPTOR POSITIVE (H): ICD-10-CM

## 2024-12-16 DIAGNOSIS — Z17.0 MALIGNANT NEOPLASM OF UPPER-INNER QUADRANT OF RIGHT BREAST IN FEMALE, ESTROGEN RECEPTOR POSITIVE (H): ICD-10-CM

## 2024-12-16 RX ORDER — TAMOXIFEN CITRATE 20 MG/1
20 TABLET ORAL DAILY
Qty: 90 TABLET | Refills: 0 | Status: SHIPPED | OUTPATIENT
Start: 2024-12-16

## 2024-12-16 NOTE — PROGRESS NOTES
[facility]: Cancer Care                                                                                          Spoke with patient who has been lost to follow-up. She needs a refill of Tamoxifen. A 90-day refill has been sent to her pharmacy, she agrees to see Dr Gerardo on 1/20 for follow-up and will receive a year refill at that time.     Rosita Ordonez MSN, RN ,OCN  Lead RN Care Coordinator - [facility]  [phone]

## 2024-12-18 DIAGNOSIS — R91.8 PULMONARY NODULES: Primary | ICD-10-CM

## 2025-01-20 ENCOUNTER — ONCOLOGY VISIT (OUTPATIENT)
Dept: ONCOLOGY | Facility: CLINIC | Age: 45
End: 2025-01-20
Attending: INTERNAL MEDICINE
Payer: COMMERCIAL

## 2025-01-20 ENCOUNTER — HOSPITAL ENCOUNTER (OUTPATIENT)
Dept: CT IMAGING | Facility: CLINIC | Age: 45
Discharge: HOME | End: 2025-01-20
Attending: INTERNAL MEDICINE | Admitting: INTERNAL MEDICINE
Payer: COMMERCIAL

## 2025-01-20 VITALS
SYSTOLIC BLOOD PRESSURE: 128 MMHG | BODY MASS INDEX: 17.87 KG/M2 | WEIGHT: 104.1 LBS | TEMPERATURE: 99.2 F | HEART RATE: 106 BPM | DIASTOLIC BLOOD PRESSURE: 79 MMHG | RESPIRATION RATE: 16 BRPM

## 2025-01-20 DIAGNOSIS — Z17.0 MALIGNANT NEOPLASM OF UPPER-INNER QUADRANT OF RIGHT BREAST IN FEMALE, ESTROGEN RECEPTOR POSITIVE (H): Primary | ICD-10-CM

## 2025-01-20 DIAGNOSIS — C50.211 MALIGNANT NEOPLASM OF UPPER-INNER QUADRANT OF RIGHT BREAST IN FEMALE, ESTROGEN RECEPTOR POSITIVE (H): Primary | ICD-10-CM

## 2025-01-20 DIAGNOSIS — R91.8 PULMONARY NODULES: ICD-10-CM

## 2025-01-20 DIAGNOSIS — Z12.11 COLON CANCER SCREENING: ICD-10-CM

## 2025-01-20 DIAGNOSIS — Z51.11 ENCOUNTER FOR ANTINEOPLASTIC CHEMOTHERAPY: ICD-10-CM

## 2025-01-20 PROCEDURE — 99214 OFFICE O/P EST MOD 30 MIN: CPT | Performed by: INTERNAL MEDICINE

## 2025-01-20 PROCEDURE — 71250 CT THORAX DX C-: CPT | Mod: 26 | Performed by: RADIOLOGY

## 2025-01-20 PROCEDURE — 99213 OFFICE O/P EST LOW 20 MIN: CPT | Performed by: INTERNAL MEDICINE

## 2025-01-20 PROCEDURE — G2211 COMPLEX E/M VISIT ADD ON: HCPCS | Performed by: INTERNAL MEDICINE

## 2025-01-20 PROCEDURE — 71250 CT THORAX DX C-: CPT

## 2025-01-20 RX ORDER — TAMOXIFEN CITRATE 20 MG/1
20 TABLET ORAL DAILY
Qty: 90 TABLET | Refills: 3 | Status: SHIPPED | OUTPATIENT
Start: 2025-01-20

## 2025-01-20 ASSESSMENT — PAIN SCALES - GENERAL: PAINLEVEL_OUTOF10: NO PAIN (0)

## 2025-01-20 NOTE — LETTER
1/20/2025      Leah Rangel  [address] [place] [address]      Dear Colleague,    Thank you for referring your patient, Leah Rangel, to the [facility]. Please see a copy of my visit note below.    January 20, 2025     Reason for Visit: follow-up invasive ductal carcinoma of the R breast, grade 3, ER positive, AL positive, HER-2 positive 3+ by IHC    Oncology HPI:   Leah Rangel is a  45 year old, premenopausal female, diagnosed with R breast cancer, T2N0 right breast cancer.      Leah reports that she has a history of bilateral implants.  She noticed a palpable mass involving her right breast in the upper inner quadrant in early 06/2020.  At that time, she went in for an evaluation with a mammogram and ultrasound.  Mammogram on 06/11/2020 revealed a 7 mm mass at the 1 o'clock position approximately 5 cm from the nipple in the upper inner quadrant of the right breast.  There was a second area measuring 6 x 5 x 4 mm at the 1 o'clock position 6 cm from the nipple.  In the left breast, there was an area of microcalcifications.  She was referred for a right breast biopsy.  Both areas at the 1 o'clock position were biopsied.  No lymph nodes were appreciated.  Both of these biopsies revealed an invasive ductal carcinoma, grade 3, ER positive, AL positive, HER-2 positive, 3+ by IHC.  She  was considered to have a T1b multifocal N1 breast cancer.  On further review, it appeared to be T2Nx. She was subsequently referred for a breast MRI.  The BMRI done on 7/10/20 showed a 4.5 x 2.4 x 3 cm mass in the R upper, inner breast. She underwent a PET/CT 7/16 and started neoadjuvant treatment with Docetaxel/Carboplatin/Trastuzumab/Pertuzumab (TCHP) 7/17/20.  Lymph nodes appeared normal on MRI and PET.     She completed five cycles of TCHP 10/9/20, had a sixth cycle with just herceptin and perjeta on 10/30/20.  She had significant problems with fatigue, nausea, weight loss and diarrhea.   As a result, her treatment was stopped one cycle early.     She underwent bilateral mastectomies and sentinel lymph node biopsy on 11/11/20 with Dr Marquez Vargas and Dr Piotr Ochoa. She had a pathologic CR. She was continued on adjuvant trastuzumab/pertuzumab on 11/23/20 with a plan for one year of adjuvant therapy. She was also started on adjuvant tamoxifen.     Interval history: Leah is feeling well today.   She has been on tamoxifen and fully only on endocrine therapy since Sept 2021.    She hasn't been seen in the clinic > 1 year.  She didn't realize that she needed the appts.      She has been tolerating tamoxifen well with period hot flashes as well as arthralgias that she can only attribute to her treatment.    She had a chest CT earlier today.      She is leaving for [place] with her  and 13 and 20 y/o daughters later this week.    ROS: 10 point ROS neg other than the symptoms noted above in the HPI.      Current Outpatient Medications   Medication Sig Dispense Refill    tamoxifen (NOLVADEX) 20 MG tablet Take 1 tablet (20 mg) by mouth daily. 90 tablet 0    bimatoprost (LATISSE) 0.03 % external opthalmic solution Apply 1 drop topically At Bedtime (Patient not taking: Reported on 1/20/2025) 5 mL 1    diphenhydrAMINE-acetaminophen (TYLENOL PM)  MG tablet Take 1 tablet by mouth nightly as needed for sleep (Patient not taking: Reported on 1/20/2025)      lidocaine (XYLOCAINE) 2 % external gel Apply topically 3 times daily as needed for moderate pain (Patient not taking: Reported on 1/20/2025) 30 mL 1    lidocaine-prilocaine (EMLA) 2.5-2.5 % external cream Apply topically as needed for moderate pain Apply to PORT site 45 minutes prior to procedure.  Cover with a non-absorbent dressing. (Patient not taking: Reported on 1/20/2025) 30 g 4    LORazepam (ATIVAN) 0.5 MG tablet Take 1 tablet (0.5 mg) by mouth every 4 hours as needed (Anxiety, Nausea/Vomiting or Sleep) (Patient not taking: Reported on  1/20/2025) 10 tablet 0    order for DME Equipment: Wig  Diagnosis: cranial alopecia (Patient not taking: Reported on 1/20/2025) 1 each 0    prochlorperazine (COMPAZINE) 10 MG tablet Take 1 tablet (10 mg) by mouth every 6 hours as needed (Nausea/Vomiting) (Patient not taking: Reported on 1/20/2025) 30 tablet 3    Vaginal Lubricant (REPLENS) GEL Apply vaginally as needed (Patient not taking: Reported on 1/20/2025) 35 g 1          Allergies   Allergen Reactions    Codeine Other (See Comments)     Got hyper         PE: /79 (BP Location: Right arm, Patient Position: Sitting, Cuff Size: Adult Regular)   Pulse 106   Temp 99.2  F (37.3  C) (Oral)   Resp 16   Wt 47.2 kg (104 lb 1.6 oz)   BMI 17.87 kg/m      General: Patient appears well in no acute distress.   Skin: No visualized rash or lesions on visualized skin. Wearing a wig with beautiful long hair  Eyes: EOMI, no erythema, sclera icterus or discharge noted  Resp: clear  CV: rrr  MSK: Appears to have normal range of motion based on visualized movements  Neurologic: No apparent tremors, facial movements symmetric  Psych: affect pleasant, engaged, alert and oriented  Breast : s/p bilateral mastectomy with reconstruction, no nodules or masses, no axillary adenopathy          Wt Readings from Last 4 Encounters:   01/20/25 47.2 kg (104 lb 1.6 oz)   07/19/23 47.6 kg (105 lb)   11/28/22 49.9 kg (110 lb)   05/02/22 52.8 kg (116 lb 8 oz)         Labs: n/a    Imaging: last echo 6/3/21 normal EF.      Impression/plan:   ER/TX +, HER-2 positive R upper/inner quadrant breast cancer. Clinically T2N0. Treated with neoadjuvant TCHP x 6 cycles and herceptin/perjeta alone with cycle 6. S/P bilateral mastecomies and sentinel LN bx on 11/11/20 with a pathologic CR.    Breast cancer - she completed adjuvant herceptin in 8/27/21.  She is on tamoxifen and tolerating this well.   We discussed continuing this for five years, perhaps longer.  This will put her on it for another year  and a half.      We reviewed all of her imaging and clinical exam; this does not demonstrate any concerning findings for malignancy.    She will return in 6 months; she will need a echo and dexa at that time.  Given she is now postmenopausal, I advised dexa.      Given her prior herceptin, she meets criteria to consider echocardiogram.      Pulm nodule - 3 mm on baseline CT.  This was repeated and was stable.  Will see her back in 1 year.  She does not need this repeated.      Lifestyle - we discussed regular exercise.  Recommendations for 150 min of moderate intensity exercise per week.        Colon cancer screening - we discussed the need for colon cancer screening.        The longitudinal plan of care for the diagnosis(es)/condition(s) as documented were addressed during this visit. Due to the added complexity in care, I will continue to support Leah in the subsequent management and with ongoing continuity of care.    > 30 min were spent in reviewing records, counseling and coordinating care          Again, thank you for allowing me to participate in the care of your patient.      Sincerely,    Lucita Gerardo MD    Electronically signed

## 2025-01-20 NOTE — NURSING NOTE
"Oncology Rooming Note    January 20, 2025 12:48 PM   Leah Rangel is a 45 year old female who presents for:    Chief Complaint   Patient presents with    Oncology Clinic Visit    Breast Cancer     Malignant neoplasm of upper-inner quadrant of right breast in female, estrogen receptor positive, unstaged; Pulmonary nodules     Initial Vitals: /79 (BP Location: Right arm, Patient Position: Sitting, Cuff Size: Adult Regular)   Pulse 106   Temp 99.2  F (37.3  C) (Oral)   Resp 16   Wt 47.2 kg (104 lb 1.6 oz)   BMI 17.87 kg/m   Estimated body mass index is 17.87 kg/m  as calculated from the following:    Height as of 9/11/24: 1.626 m (5' 4\").    Weight as of this encounter: 47.2 kg (104 lb 1.6 oz). Body surface area is 1.46 meters squared.  No Pain (0) Comment: Data Unavailable   No LMP recorded. (Menstrual status: Chemotherapy).  Allergies reviewed: Yes  Medications reviewed: Yes    Medications: MEDICATION REFILLS NEEDED TODAY. Provider was notified.  Pharmacy name entered into ServiceFrame:    [id number] [address] [facility] [address] [facility] [address] [facility] [address]   [place], [place] - [address] [place] [address] [phone]  [facility] [address] [place] [address]    Frailty Screening:   Is the patient here for a new oncology consult visit in cancer care? 2. No      Clinical concerns: Pt needs refill on Tamoxifen. Pt reports no new concerns today. Dr. Gerardo was notified via message.       Laxmi Blackwood, EMT     "

## 2025-01-20 NOTE — PROGRESS NOTES
January 20, 2025     Reason for Visit: follow-up invasive ductal carcinoma of the R breast, grade 3, ER positive, AK positive, HER-2 positive 3+ by IHC    Oncology HPI:   Leah Rangel is a  45 year old, premenopausal female, diagnosed with R breast cancer, T2N0 right breast cancer.      Leah reports that she has a history of bilateral implants.  She noticed a palpable mass involving her right breast in the upper inner quadrant in early 06/2020.  At that time, she went in for an evaluation with a mammogram and ultrasound.  Mammogram on 06/11/2020 revealed a 7 mm mass at the 1 o'clock position approximately 5 cm from the nipple in the upper inner quadrant of the right breast.  There was a second area measuring 6 x 5 x 4 mm at the 1 o'clock position 6 cm from the nipple.  In the left breast, there was an area of microcalcifications.  She was referred for a right breast biopsy.  Both areas at the 1 o'clock position were biopsied.  No lymph nodes were appreciated.  Both of these biopsies revealed an invasive ductal carcinoma, grade 3, ER positive, AK positive, HER-2 positive, 3+ by IHC.  She  was considered to have a T1b multifocal N1 breast cancer.  On further review, it appeared to be T2Nx. She was subsequently referred for a breast MRI.  The BMRI done on 7/10/20 showed a 4.5 x 2.4 x 3 cm mass in the R upper, inner breast. She underwent a PET/CT 7/16 and started neoadjuvant treatment with Docetaxel/Carboplatin/Trastuzumab/Pertuzumab (TCHP) 7/17/20.  Lymph nodes appeared normal on MRI and PET.     She completed five cycles of TCHP 10/9/20, had a sixth cycle with just herceptin and perjeta on 10/30/20.  She had significant problems with fatigue, nausea, weight loss and diarrhea.  As a result, her treatment was stopped one cycle early.     She underwent bilateral mastectomies and sentinel lymph node biopsy on 11/11/20 with Dr Marquez Vargas and Dr Piotr Ochoa. She had a pathologic CR. She was continued on adjuvant  trastuzumab/pertuzumab on 11/23/20 with a plan for one year of adjuvant therapy. She was also started on adjuvant tamoxifen.     Interval history: Leah is feeling well today.   She has been on tamoxifen and fully only on endocrine therapy since Sept 2021.    She hasn't been seen in the clinic > 1 year.  She didn't realize that she needed the appts.      She has been tolerating tamoxifen well with period hot flashes as well as arthralgias that she can only attribute to her treatment.    She had a chest CT earlier today.      She is leaving for [place] with her  and 13 and 20 y/o daughters later this week.    ROS: 10 point ROS neg other than the symptoms noted above in the HPI.      Current Outpatient Medications   Medication Sig Dispense Refill    tamoxifen (NOLVADEX) 20 MG tablet Take 1 tablet (20 mg) by mouth daily. 90 tablet 0    bimatoprost (LATISSE) 0.03 % external opthalmic solution Apply 1 drop topically At Bedtime (Patient not taking: Reported on 1/20/2025) 5 mL 1    diphenhydrAMINE-acetaminophen (TYLENOL PM)  MG tablet Take 1 tablet by mouth nightly as needed for sleep (Patient not taking: Reported on 1/20/2025)      lidocaine (XYLOCAINE) 2 % external gel Apply topically 3 times daily as needed for moderate pain (Patient not taking: Reported on 1/20/2025) 30 mL 1    lidocaine-prilocaine (EMLA) 2.5-2.5 % external cream Apply topically as needed for moderate pain Apply to PORT site 45 minutes prior to procedure.  Cover with a non-absorbent dressing. (Patient not taking: Reported on 1/20/2025) 30 g 4    LORazepam (ATIVAN) 0.5 MG tablet Take 1 tablet (0.5 mg) by mouth every 4 hours as needed (Anxiety, Nausea/Vomiting or Sleep) (Patient not taking: Reported on 1/20/2025) 10 tablet 0    order for DME Equipment: Wig  Diagnosis: cranial alopecia (Patient not taking: Reported on 1/20/2025) 1 each 0    prochlorperazine (COMPAZINE) 10 MG tablet Take 1 tablet (10 mg) by mouth every 6 hours as needed  (Nausea/Vomiting) (Patient not taking: Reported on 1/20/2025) 30 tablet 3    Vaginal Lubricant (REPLENS) GEL Apply vaginally as needed (Patient not taking: Reported on 1/20/2025) 35 g 1          Allergies   Allergen Reactions    Codeine Other (See Comments)     Got hyper         PE: /79 (BP Location: Right arm, Patient Position: Sitting, Cuff Size: Adult Regular)   Pulse 106   Temp 99.2  F (37.3  C) (Oral)   Resp 16   Wt 47.2 kg (104 lb 1.6 oz)   BMI 17.87 kg/m      General: Patient appears well in no acute distress.   Skin: No visualized rash or lesions on visualized skin. Wearing a wig with beautiful long hair  Eyes: EOMI, no erythema, sclera icterus or discharge noted  Resp: clear  CV: rrr  MSK: Appears to have normal range of motion based on visualized movements  Neurologic: No apparent tremors, facial movements symmetric  Psych: affect pleasant, engaged, alert and oriented  Breast : s/p bilateral mastectomy with reconstruction, no nodules or masses, no axillary adenopathy          Wt Readings from Last 4 Encounters:   01/20/25 47.2 kg (104 lb 1.6 oz)   07/19/23 47.6 kg (105 lb)   11/28/22 49.9 kg (110 lb)   05/02/22 52.8 kg (116 lb 8 oz)         Labs: n/a    Imaging: last echo 6/3/21 normal EF.      Impression/plan:   ER/MS +, HER-2 positive R upper/inner quadrant breast cancer. Clinically T2N0. Treated with neoadjuvant TCHP x 6 cycles and herceptin/perjeta alone with cycle 6. S/P bilateral mastecomies and sentinel LN bx on 11/11/20 with a pathologic CR.    Breast cancer - she completed adjuvant herceptin in 8/27/21.  She is on tamoxifen and tolerating this well.   We discussed continuing this for five years, perhaps longer.  This will put her on it for another year and a half.      We reviewed all of her imaging and clinical exam; this does not demonstrate any concerning findings for malignancy.    She will return in 6 months; she will need a echo and dexa at that time.  Given she is now  postmenopausal, I advised dexa.      Given her prior herceptin, she meets criteria to consider echocardiogram.      Pulm nodule - 3 mm on baseline CT.  This was repeated and was stable.  Will see her back in 1 year.  She does not need this repeated.      Lifestyle - we discussed regular exercise.  Recommendations for 150 min of moderate intensity exercise per week.        Colon cancer screening - we discussed the need for colon cancer screening.      Lucita Gerardo MD     The longitudinal plan of care for the diagnosis(es)/condition(s) as documented were addressed during this visit. Due to the added complexity in care, I will continue to support Leah in the subsequent management and with ongoing continuity of care.    > 30 min were spent in reviewing records, counseling and coordinating care

## 2025-03-02 ENCOUNTER — HEALTH MAINTENANCE LETTER (OUTPATIENT)
Age: 45
End: 2025-03-02

## 2025-07-12 ENCOUNTER — HEALTH MAINTENANCE LETTER (OUTPATIENT)
Age: 45
End: 2025-07-12

## (undated) DEVICE — SU MONOCRYL 4-0 P-3 18" UND Y494G

## (undated) DEVICE — KIT INTRODUCER FLUENT MICRO 5FRX10CM ECHO TIP KIT-038-04

## (undated) DEVICE — ADH SKIN CLOSURE PREMIERPRO EXOFIN 1.0ML 3470

## (undated) DEVICE — LINEN TOWEL PACK X5 5464

## (undated) DEVICE — PACK CENTRAL LINE INSERTION SAN32CLFCG

## (undated) DEVICE — SOL NACL 0.9% INJ 250ML BAG 2B1322Q

## (undated) DEVICE — SU VICRYL 3-0 SH 27" J316H

## (undated) DEVICE — KNIFE HANDLE W/15 BLADE 371615

## (undated) DEVICE — Device

## (undated) DEVICE — LINEN GOWN XLG 5407

## (undated) DEVICE — COVER ULTRASOUND PROBE W/GEL FLEXI-FEEL 6"X58" LF  25-FF658

## (undated) DEVICE — DECANTER BAG 2002S

## (undated) DEVICE — CONNECTOR MALE TO MALE LL

## (undated) DEVICE — GLOVE PROTEXIS POWDER FREE SMT 8.0  2D72PT80X

## (undated) DEVICE — GLOVE PROTEXIS POWDER FREE SMT 7.5  2D72PT75X

## (undated) DEVICE — GOWN XLG DISP 9545

## (undated) DEVICE — SU VICRYL 4-0 P-3 18" UND  J494H

## (undated) RX ORDER — CEFAZOLIN SODIUM 1 G/3ML
INJECTION, POWDER, FOR SOLUTION INTRAMUSCULAR; INTRAVENOUS
Status: DISPENSED
Start: 2020-12-15

## (undated) RX ORDER — GABAPENTIN 300 MG/1
CAPSULE ORAL
Status: DISPENSED
Start: 2020-07-15

## (undated) RX ORDER — CEFAZOLIN SODIUM 1 G/3ML
INJECTION, POWDER, FOR SOLUTION INTRAMUSCULAR; INTRAVENOUS
Status: DISPENSED
Start: 2020-07-15

## (undated) RX ORDER — GABAPENTIN 300 MG/1
CAPSULE ORAL
Status: DISPENSED
Start: 2020-12-15

## (undated) RX ORDER — HEPARIN SODIUM,PORCINE 10 UNIT/ML
VIAL (ML) INTRAVENOUS
Status: DISPENSED
Start: 2020-12-15

## (undated) RX ORDER — HEPARIN SODIUM (PORCINE) LOCK FLUSH IV SOLN 100 UNIT/ML 100 UNIT/ML
SOLUTION INTRAVENOUS
Status: DISPENSED
Start: 2020-12-15

## (undated) RX ORDER — ACETAMINOPHEN 325 MG/1
TABLET ORAL
Status: DISPENSED
Start: 2020-07-15

## (undated) RX ORDER — ACETAMINOPHEN 325 MG/1
TABLET ORAL
Status: DISPENSED
Start: 2020-12-15